# Patient Record
Sex: FEMALE | Race: BLACK OR AFRICAN AMERICAN | NOT HISPANIC OR LATINO | Employment: FULL TIME | URBAN - METROPOLITAN AREA
[De-identification: names, ages, dates, MRNs, and addresses within clinical notes are randomized per-mention and may not be internally consistent; named-entity substitution may affect disease eponyms.]

---

## 2020-12-03 ENCOUNTER — OFFICE VISIT (OUTPATIENT)
Dept: FAMILY MEDICINE CLINIC | Facility: CLINIC | Age: 45
End: 2020-12-03

## 2020-12-03 ENCOUNTER — HOSPITAL ENCOUNTER (OUTPATIENT)
Dept: RADIOLOGY | Facility: HOSPITAL | Age: 45
Discharge: HOME | End: 2020-12-03
Payer: COMMERCIAL

## 2020-12-03 VITALS
HEIGHT: 64 IN | BODY MASS INDEX: 38.24 KG/M2 | SYSTOLIC BLOOD PRESSURE: 122 MMHG | HEART RATE: 80 BPM | DIASTOLIC BLOOD PRESSURE: 72 MMHG | TEMPERATURE: 97.8 F | OXYGEN SATURATION: 98 % | WEIGHT: 224 LBS

## 2020-12-03 DIAGNOSIS — M79.671 RIGHT FOOT PAIN: Primary | ICD-10-CM

## 2020-12-03 DIAGNOSIS — M79.671 RIGHT FOOT PAIN: ICD-10-CM

## 2020-12-03 DIAGNOSIS — Z00.00 HEALTHCARE MAINTENANCE: ICD-10-CM

## 2020-12-03 DIAGNOSIS — M77.9 TENDONITIS: ICD-10-CM

## 2020-12-03 DIAGNOSIS — K21.9 GASTROESOPHAGEAL REFLUX DISEASE WITHOUT ESOPHAGITIS: ICD-10-CM

## 2020-12-03 PROBLEM — Z76.89 ENCOUNTER TO ESTABLISH CARE: Status: ACTIVE | Noted: 2020-12-03

## 2020-12-03 PROCEDURE — 73630 X-RAY EXAM OF FOOT: CPT

## 2020-12-03 PROCEDURE — 99202 OFFICE O/P NEW SF 15 MIN: CPT | Performed by: NURSE PRACTITIONER

## 2020-12-03 RX ORDER — METHOCARBAMOL 500 MG/1
500 TABLET, FILM COATED ORAL 4 TIMES DAILY
Qty: 20 TABLET | Refills: 0 | Status: SHIPPED | OUTPATIENT
Start: 2020-12-03 | End: 2022-04-06 | Stop reason: ALTCHOICE

## 2020-12-03 RX ORDER — METHYLPREDNISOLONE 4 MG/1
TABLET ORAL
Qty: 21 EACH | Refills: 0 | Status: SHIPPED | OUTPATIENT
Start: 2020-12-03 | End: 2022-04-06 | Stop reason: ALTCHOICE

## 2020-12-03 RX ORDER — PANTOPRAZOLE SODIUM 20 MG/1
20 TABLET, DELAYED RELEASE ORAL
Qty: 30 TABLET | Refills: 5 | Status: SHIPPED | OUTPATIENT
Start: 2020-12-03

## 2020-12-10 ENCOUNTER — OFFICE VISIT (OUTPATIENT)
Dept: FAMILY MEDICINE CLINIC | Facility: CLINIC | Age: 45
End: 2020-12-10

## 2020-12-10 VITALS
TEMPERATURE: 98.6 F | BODY MASS INDEX: 38.07 KG/M2 | SYSTOLIC BLOOD PRESSURE: 130 MMHG | OXYGEN SATURATION: 98 % | WEIGHT: 223 LBS | DIASTOLIC BLOOD PRESSURE: 78 MMHG | RESPIRATION RATE: 18 BRPM | HEIGHT: 64 IN | HEART RATE: 86 BPM

## 2020-12-10 DIAGNOSIS — N92.1 MENORRHAGIA WITH IRREGULAR CYCLE: Primary | ICD-10-CM

## 2020-12-10 PROBLEM — M79.671 RIGHT FOOT PAIN: Status: ACTIVE | Noted: 2020-12-10

## 2020-12-10 PROCEDURE — 99213 OFFICE O/P EST LOW 20 MIN: CPT | Performed by: NURSE PRACTITIONER

## 2020-12-10 RX ORDER — TRANEXAMIC ACID 650 1/1
650 TABLET ORAL 3 TIMES DAILY PRN
Qty: 30 TABLET | Refills: 0 | Status: SHIPPED | OUTPATIENT
Start: 2020-12-10 | End: 2022-04-06 | Stop reason: ALTCHOICE

## 2021-08-11 ENCOUNTER — OFFICE VISIT (OUTPATIENT)
Dept: FAMILY MEDICINE CLINIC | Facility: CLINIC | Age: 46
End: 2021-08-11
Payer: COMMERCIAL

## 2021-08-11 VITALS
RESPIRATION RATE: 16 BRPM | DIASTOLIC BLOOD PRESSURE: 90 MMHG | TEMPERATURE: 98.4 F | WEIGHT: 223.6 LBS | OXYGEN SATURATION: 98 % | HEART RATE: 80 BPM | SYSTOLIC BLOOD PRESSURE: 140 MMHG | BODY MASS INDEX: 38.17 KG/M2 | HEIGHT: 64 IN

## 2021-08-11 DIAGNOSIS — Z12.31 SCREENING MAMMOGRAM, ENCOUNTER FOR: ICD-10-CM

## 2021-08-11 DIAGNOSIS — Z00.00 ANNUAL PHYSICAL EXAM: Primary | ICD-10-CM

## 2021-08-11 DIAGNOSIS — G47.30 SLEEP APNEA, UNSPECIFIED TYPE: ICD-10-CM

## 2021-08-11 DIAGNOSIS — Z00.00 HEALTHCARE MAINTENANCE: ICD-10-CM

## 2021-08-11 DIAGNOSIS — R06.83 SNORING: ICD-10-CM

## 2021-08-11 PROCEDURE — 99396 PREV VISIT EST AGE 40-64: CPT | Performed by: NURSE PRACTITIONER

## 2021-08-11 NOTE — ASSESSMENT & PLAN NOTE
Patient is here for annual physical   Mammogram ordered  Will get Pap smear done in office  Encouraged heart healthy diet  Discussed elevated blood pressure in office today  Advised to check at home  If blood pressure continues to be 150/90 call office  Discussed the benefits of weight loss  's license permit completed

## 2021-08-11 NOTE — PROGRESS NOTES
[facility] [address]     NAME: Nyla Cuello  AGE: 54 y o  SEX: female  : 1975     DATE: 2021     Assessment and Plan:     Problem List Items Addressed This Visit        Other    Annual physical exam - Primary     Patient is here for annual physical   Mammogram ordered  Will get Pap smear done in office  Encouraged heart healthy diet  Discussed elevated blood pressure in office today  Advised to check at home  If blood pressure continues to be 150/90 call office  Discussed the benefits of weight loss  's license permit completed  Other Visit Diagnoses     Snoring        Relevant Orders    Diagnostic Sleep Study    Sleep apnea, unspecified type        Relevant Orders    Diagnostic Sleep Study    Healthcare maintenance        Relevant Orders    CBC and differential    Comprehensive metabolic panel    Lipid panel    TSH, 3rd generation with Free T4 reflex    Screening mammogram, encounter for        Relevant Orders    Mammo screening bilateral w cad          Immunizations and preventive care screenings were discussed with patient today  Appropriate education was printed on patient's after visit summary  Counseling:  Alcohol/drug use: discussed moderation in alcohol intake, the recommendations for healthy alcohol use, and avoidance of illicit drug use  Dental Health: discussed importance of regular tooth brushing, flossing, and dental visits  Injury prevention: discussed safety/seat belts, safety helmets, smoke detectors, carbon dioxide detectors, and smoking near bedding or upholstery  Sexual health: discussed sexually transmitted diseases, partner selection, use of condoms, avoidance of unintended pregnancy, and contraceptive alternatives  · Exercise: the importance of regular exercise/physical activity was discussed   Recommend exercise 3-5 times per week for at least 30 minutes  BMI Counseling: Body mass index is 38 38 kg/m²  The BMI is above normal  Nutrition recommendations include decreasing portion sizes, encouraging healthy choices of fruits and vegetables, decreasing fast food intake, consuming healthier snacks, limiting drinks that contain sugar, moderation in carbohydrate intake, increasing intake of lean protein, reducing intake of saturated and trans fat and reducing intake of cholesterol  Exercise recommendations include exercising 3-5 times per week and strength training exercises  No pharmacotherapy was ordered  No follow-ups on file  Chief Complaint:     Chief Complaint   Patient presents with    Physical Exam     due for mammo and pap BMI f/u plan       History of Present Illness:     Adult Annual Physical   Patient here for a comprehensive physical exam  The patient reports no problems  Diet and Physical Activity  · Diet/Nutrition: well balanced diet  · Exercise: no formal exercise  Depression Screening  PHQ-9 Depression Screening    PHQ-9:   Frequency of the following problems over the past two weeks:      Little interest or pleasure in doing things: 0 - not at all  Feeling down, depressed, or hopeless: 0 - not at all  PHQ-2 Score: 0       General Health  · Sleep: sleeps well  · Hearing: normal - bilateral   · Vision: most recent eye exam <1 year ago and wears glasses  · Dental: regular dental visits and brushes teeth twice daily  /GYN Health  · Patient is: premenopausal  · Last menstrual period:  today  · Contraceptive method: none needed  Review of Systems:     Review of Systems   Constitutional: Negative  HENT: Negative  Eyes: Negative  Respiratory: Negative  Cardiovascular: Negative  Gastrointestinal: Negative  Endocrine: Negative  Genitourinary: Negative  Musculoskeletal: Negative  Skin: Negative  Allergic/Immunologic: Negative  Neurological: Negative      Psychiatric/Behavioral: Negative  Past Medical History:     No past medical history on file  Past Surgical History:     No past surgical history on file  Social History:     Social History     Socioeconomic History    Marital status: /Civil Union     Spouse name: None    Number of children: None    Years of education: None    Highest education level: None   Occupational History    None   Tobacco Use    Smoking status: Never Smoker    Smokeless tobacco: Never Used   Substance and Sexual Activity    Alcohol use: Not Currently    Drug use: None    Sexual activity: None   Other Topics Concern    None   Social History Narrative    None     Social Determinants of Health     Financial Resource Strain:     Difficulty of Paying Living Expenses:    Food Insecurity:     Worried About Running Out of Food in the Last Year:     Ran Out of Food in the Last Year:    Transportation Needs:     Lack of Transportation (Medical):  Lack of Transportation (Non-Medical):    Physical Activity:     Days of Exercise per Week:     Minutes of Exercise per Session:    Stress:     Feeling of Stress :    Social Connections:     Frequency of Communication with Friends and Family:     Frequency of Social Gatherings with Friends and Family:     Attends Taoism Services:     Active Member of Clubs or Organizations:     Attends Club or Organization Meetings:     Marital Status:    Intimate Partner Violence:     Fear of Current or Ex-Partner:     Emotionally Abused:     Physically Abused:     Sexually Abused:       Family History:     No family history on file     Current Medications:     Current Outpatient Medications   Medication Sig Dispense Refill    methocarbamol (ROBAXIN) 500 mg tablet Take 1 tablet (500 mg total) by mouth 4 (four) times a day (Patient not taking: Reported on 8/11/2021) 20 tablet 0    methylPREDNISolone 4 MG tablet therapy pack Use as directed on package (Patient not taking: Reported on 8/11/2021) 21 each 0    pantoprazole (PROTONIX) 20 mg tablet Take 1 tablet (20 mg total) by mouth daily before breakfast (Patient not taking: Reported on 8/11/2021) 30 tablet 5    Tranexamic Acid 650 MG TABS Take 1 tablet (650 mg total) by mouth 3 (three) times a day as needed (menorrhagia) (Patient not taking: Reported on 8/11/2021) 30 tablet 0     No current facility-administered medications for this visit  Allergies:     No Known Allergies   Physical Exam:     /90   Pulse 80   Temp 98 4 °F (36 9 °C)   Resp 16   Ht 5' 4" (1 626 m)   Wt 101 kg (223 lb 9 6 oz)   SpO2 98%   BMI 38 38 kg/m²     Physical Exam  Constitutional:       General: She is not in acute distress  Appearance: Normal appearance  She is obese  She is not ill-appearing  HENT:      Head: Normocephalic and atraumatic  Nose: Nose normal       Mouth/Throat:      Mouth: Mucous membranes are moist    Eyes:      Pupils: Pupils are equal, round, and reactive to light  Cardiovascular:      Rate and Rhythm: Normal rate and regular rhythm  Pulses: Normal pulses  Heart sounds: Normal heart sounds  Pulmonary:      Effort: Pulmonary effort is normal  No respiratory distress  Breath sounds: Normal breath sounds  Chest:      Chest wall: No tenderness  Abdominal:      General: Abdomen is flat  Bowel sounds are normal  There is no distension  Palpations: There is no mass  Tenderness: There is no abdominal tenderness  Musculoskeletal:         General: Normal range of motion  Cervical back: Normal range of motion and neck supple  Skin:     General: Skin is warm and dry  Neurological:      General: No focal deficit present  Mental Status: She is alert and oriented to person, place, and time  Psychiatric:         Mood and Affect: Mood normal          Behavior: Behavior normal          Thought Content:  Thought content normal          Judgment: Judgment normal           Al Forrester, [facility] [address]

## 2021-08-11 NOTE — PATIENT INSTRUCTIONS

## 2021-08-23 ENCOUNTER — TELEPHONE (OUTPATIENT)
Dept: FAMILY MEDICINE CLINIC | Facility: CLINIC | Age: 46
End: 2021-08-23

## 2021-08-23 NOTE — TELEPHONE ENCOUNTER
Called patient to schedule her for her PAP  Unable to leave a message due to vm box not being set up

## 2021-12-19 ENCOUNTER — HOSPITAL ENCOUNTER (EMERGENCY)
Facility: HOSPITAL | Age: 46
Discharge: HOME | End: 2021-12-20
Attending: EMERGENCY MEDICINE
Payer: COMMERCIAL

## 2021-12-19 DIAGNOSIS — F32.A DEPRESSION: Primary | ICD-10-CM

## 2021-12-19 LAB
AMPHETAMINES SERPL QL SCN: NEGATIVE
BARBITURATES UR QL: NEGATIVE
BENZODIAZ UR QL: NEGATIVE
COCAINE UR QL: NEGATIVE
ETHANOL EXG-MCNC: 0 MG/DL
EXT PREG TEST URINE: NEGATIVE
EXT. CONTROL ED NAV: NORMAL
FLUAV RNA RESP QL NAA+PROBE: NEGATIVE
FLUBV RNA RESP QL NAA+PROBE: NEGATIVE
METHADONE UR QL: NEGATIVE
OPIATES UR QL SCN: NEGATIVE
OXYCODONE+OXYMORPHONE UR QL SCN: NEGATIVE
PCP UR QL: NEGATIVE
RSV RNA RESP QL NAA+PROBE: NEGATIVE
SARS-COV-2 RNA RESP QL NAA+PROBE: NEGATIVE
THC UR QL: NEGATIVE

## 2021-12-19 PROCEDURE — 0241U HB NFCT DS VIR RESP RNA 4 TRGT: CPT

## 2021-12-19 PROCEDURE — 80307 DRUG TEST PRSMV CHEM ANLYZR: CPT

## 2021-12-19 PROCEDURE — 99285 EMERGENCY DEPT VISIT HI MDM: CPT

## 2021-12-19 PROCEDURE — 99284 EMERGENCY DEPT VISIT MOD MDM: CPT

## 2021-12-19 PROCEDURE — 81025 URINE PREGNANCY TEST: CPT

## 2021-12-19 PROCEDURE — 82075 ASSAY OF BREATH ETHANOL: CPT

## 2021-12-19 RX ORDER — ACETAMINOPHEN 325 MG/1
650 TABLET ORAL ONCE
Status: DISCONTINUED | OUTPATIENT
Start: 2021-12-20 | End: 2021-12-20 | Stop reason: HOSPADM

## 2021-12-19 RX ORDER — KETOROLAC TROMETHAMINE 30 MG/ML
15 INJECTION, SOLUTION INTRAMUSCULAR; INTRAVENOUS ONCE
Status: DISCONTINUED | OUTPATIENT
Start: 2021-12-19 | End: 2021-12-20 | Stop reason: HOSPADM

## 2021-12-20 VITALS
OXYGEN SATURATION: 97 % | TEMPERATURE: 97.9 F | BODY MASS INDEX: 39.34 KG/M2 | HEIGHT: 64 IN | RESPIRATION RATE: 18 BRPM | HEART RATE: 77 BPM | WEIGHT: 230.4 LBS | SYSTOLIC BLOOD PRESSURE: 115 MMHG | DIASTOLIC BLOOD PRESSURE: 56 MMHG

## 2021-12-20 PROCEDURE — 99244 OFF/OP CNSLTJ NEW/EST MOD 40: CPT | Performed by: PSYCHIATRY & NEUROLOGY

## 2021-12-20 RX ORDER — ACETAMINOPHEN 325 MG/1
650 TABLET ORAL ONCE
Status: COMPLETED | OUTPATIENT
Start: 2021-12-20 | End: 2021-12-20

## 2021-12-20 RX ADMIN — ACETAMINOPHEN 650 MG: 325 TABLET, FILM COATED ORAL at 09:23

## 2021-12-21 ENCOUNTER — TELEPHONE (OUTPATIENT)
Dept: FAMILY MEDICINE CLINIC | Facility: CLINIC | Age: 46
End: 2021-12-21

## 2021-12-21 NOTE — TELEPHONE ENCOUNTER
Pt's  is requesting a phone call back  He has question about [facility] hospital gave her wife  appt with you was made on 12/22   Please advise, Thank misael

## 2021-12-22 ENCOUNTER — OFFICE VISIT (OUTPATIENT)
Dept: FAMILY MEDICINE CLINIC | Facility: CLINIC | Age: 46
End: 2021-12-22
Payer: COMMERCIAL

## 2021-12-22 VITALS
TEMPERATURE: 99.1 F | HEIGHT: 64 IN | RESPIRATION RATE: 18 BRPM | SYSTOLIC BLOOD PRESSURE: 120 MMHG | BODY MASS INDEX: 38.31 KG/M2 | DIASTOLIC BLOOD PRESSURE: 82 MMHG | WEIGHT: 224.4 LBS | OXYGEN SATURATION: 98 % | HEART RATE: 93 BPM

## 2021-12-22 DIAGNOSIS — F32.A ANXIETY AND DEPRESSION: Primary | ICD-10-CM

## 2021-12-22 DIAGNOSIS — R53.83 FATIGUE, UNSPECIFIED TYPE: ICD-10-CM

## 2021-12-22 DIAGNOSIS — D50.9 IRON DEFICIENCY ANEMIA, UNSPECIFIED IRON DEFICIENCY ANEMIA TYPE: ICD-10-CM

## 2021-12-22 DIAGNOSIS — F41.9 ANXIETY AND DEPRESSION: Primary | ICD-10-CM

## 2021-12-22 PROCEDURE — 99214 OFFICE O/P EST MOD 30 MIN: CPT | Performed by: NURSE PRACTITIONER

## 2021-12-23 PROBLEM — F32.A ANXIETY AND DEPRESSION: Status: ACTIVE | Noted: 2021-12-23

## 2021-12-23 PROBLEM — R53.83 FATIGUE: Status: ACTIVE | Noted: 2021-12-23

## 2021-12-23 PROBLEM — F41.9 ANXIETY AND DEPRESSION: Status: ACTIVE | Noted: 2021-12-23

## 2021-12-30 ENCOUNTER — APPOINTMENT (OUTPATIENT)
Dept: LAB | Facility: HOSPITAL | Age: 46
End: 2021-12-30
Payer: COMMERCIAL

## 2021-12-30 DIAGNOSIS — D50.9 IRON DEFICIENCY ANEMIA, UNSPECIFIED IRON DEFICIENCY ANEMIA TYPE: ICD-10-CM

## 2021-12-30 DIAGNOSIS — Z00.00 HEALTHCARE MAINTENANCE: ICD-10-CM

## 2021-12-30 DIAGNOSIS — F32.A ANXIETY AND DEPRESSION: ICD-10-CM

## 2021-12-30 DIAGNOSIS — R53.83 FATIGUE, UNSPECIFIED TYPE: ICD-10-CM

## 2021-12-30 DIAGNOSIS — F41.9 ANXIETY AND DEPRESSION: ICD-10-CM

## 2021-12-30 LAB
25(OH)D3 SERPL-MCNC: 21.6 NG/ML (ref 30–100)
ALBUMIN SERPL BCP-MCNC: 3.3 G/DL (ref 3.5–5)
ALP SERPL-CCNC: 103 U/L (ref 46–116)
ALT SERPL W P-5'-P-CCNC: 28 U/L (ref 12–78)
ANION GAP SERPL CALCULATED.3IONS-SCNC: 11 MMOL/L (ref 4–13)
AST SERPL W P-5'-P-CCNC: 27 U/L (ref 5–45)
BASOPHILS # BLD AUTO: 0.08 THOUSANDS/ΜL (ref 0–0.1)
BASOPHILS NFR BLD AUTO: 1 % (ref 0–1)
BILIRUB SERPL-MCNC: 0.55 MG/DL (ref 0.2–1)
BUN SERPL-MCNC: 6 MG/DL (ref 5–25)
CALCIUM ALBUM COR SERPL-MCNC: 8.5 MG/DL (ref 8.3–10.1)
CALCIUM SERPL-MCNC: 7.9 MG/DL (ref 8.3–10.1)
CHLORIDE SERPL-SCNC: 106 MMOL/L (ref 100–108)
CHOLEST SERPL-MCNC: 138 MG/DL
CO2 SERPL-SCNC: 25 MMOL/L (ref 21–32)
CREAT SERPL-MCNC: 0.83 MG/DL (ref 0.6–1.3)
EOSINOPHIL # BLD AUTO: 0.34 THOUSAND/ΜL (ref 0–0.61)
EOSINOPHIL NFR BLD AUTO: 4 % (ref 0–6)
ERYTHROCYTE [DISTWIDTH] IN BLOOD BY AUTOMATED COUNT: 20.5 % (ref 11.6–15.1)
FERRITIN SERPL-MCNC: 4 NG/ML (ref 8–388)
GFR SERPL CREATININE-BSD FRML MDRD: 84 ML/MIN/1.73SQ M
GLUCOSE P FAST SERPL-MCNC: 87 MG/DL (ref 65–99)
HCT VFR BLD AUTO: 32.9 % (ref 34.8–46.1)
HDLC SERPL-MCNC: 41 MG/DL
HGB BLD-MCNC: 9.3 G/DL (ref 11.5–15.4)
IMM GRANULOCYTES # BLD AUTO: 0.03 THOUSAND/UL (ref 0–0.2)
IMM GRANULOCYTES NFR BLD AUTO: 0 % (ref 0–2)
IRON SATN MFR SERPL: 6 % (ref 15–50)
IRON SERPL-MCNC: 24 UG/DL (ref 50–170)
LDLC SERPL CALC-MCNC: 85 MG/DL (ref 0–100)
LYMPHOCYTES # BLD AUTO: 2.83 THOUSANDS/ΜL (ref 0.6–4.47)
LYMPHOCYTES NFR BLD AUTO: 33 % (ref 14–44)
MCH RBC QN AUTO: 19.6 PG (ref 26.8–34.3)
MCHC RBC AUTO-ENTMCNC: 28.3 G/DL (ref 31.4–37.4)
MCV RBC AUTO: 69 FL (ref 82–98)
MONOCYTES # BLD AUTO: 0.77 THOUSAND/ΜL (ref 0.17–1.22)
MONOCYTES NFR BLD AUTO: 9 % (ref 4–12)
NEUTROPHILS # BLD AUTO: 4.6 THOUSANDS/ΜL (ref 1.85–7.62)
NEUTS SEG NFR BLD AUTO: 53 % (ref 43–75)
NONHDLC SERPL-MCNC: 97 MG/DL
NRBC BLD AUTO-RTO: 0 /100 WBCS
PLATELET # BLD AUTO: 519 THOUSANDS/UL (ref 149–390)
PMV BLD AUTO: 10.9 FL (ref 8.9–12.7)
POTASSIUM SERPL-SCNC: 3.8 MMOL/L (ref 3.5–5.3)
PROT SERPL-MCNC: 7.7 G/DL (ref 6.4–8.2)
RBC # BLD AUTO: 4.75 MILLION/UL (ref 3.81–5.12)
SODIUM SERPL-SCNC: 142 MMOL/L (ref 136–145)
T4 FREE SERPL-MCNC: 0.69 NG/DL (ref 0.76–1.46)
TIBC SERPL-MCNC: 383 UG/DL (ref 250–450)
TRIGL SERPL-MCNC: 61 MG/DL
TSH SERPL DL<=0.05 MIU/L-ACNC: 6.9 UIU/ML (ref 0.36–3.74)
WBC # BLD AUTO: 8.65 THOUSAND/UL (ref 4.31–10.16)

## 2021-12-30 PROCEDURE — 83550 IRON BINDING TEST: CPT

## 2021-12-30 PROCEDURE — 82306 VITAMIN D 25 HYDROXY: CPT

## 2021-12-30 PROCEDURE — 82728 ASSAY OF FERRITIN: CPT

## 2021-12-30 PROCEDURE — 83540 ASSAY OF IRON: CPT

## 2021-12-30 PROCEDURE — 80061 LIPID PANEL: CPT

## 2021-12-30 PROCEDURE — 36415 COLL VENOUS BLD VENIPUNCTURE: CPT

## 2021-12-30 PROCEDURE — 85025 COMPLETE CBC W/AUTO DIFF WBC: CPT

## 2021-12-30 PROCEDURE — 84439 ASSAY OF FREE THYROXINE: CPT

## 2021-12-30 PROCEDURE — 80053 COMPREHEN METABOLIC PANEL: CPT

## 2021-12-30 PROCEDURE — 84443 ASSAY THYROID STIM HORMONE: CPT

## 2022-01-11 ENCOUNTER — OFFICE VISIT (OUTPATIENT)
Dept: FAMILY MEDICINE CLINIC | Facility: CLINIC | Age: 47
End: 2022-01-11
Payer: COMMERCIAL

## 2022-01-11 VITALS
HEART RATE: 63 BPM | SYSTOLIC BLOOD PRESSURE: 126 MMHG | DIASTOLIC BLOOD PRESSURE: 78 MMHG | WEIGHT: 226 LBS | HEIGHT: 64 IN | OXYGEN SATURATION: 99 % | BODY MASS INDEX: 38.58 KG/M2

## 2022-01-11 DIAGNOSIS — Z12.31 ENCOUNTER FOR SCREENING MAMMOGRAM FOR MALIGNANT NEOPLASM OF BREAST: ICD-10-CM

## 2022-01-11 DIAGNOSIS — E03.9 HYPOTHYROIDISM, UNSPECIFIED TYPE: ICD-10-CM

## 2022-01-11 DIAGNOSIS — R11.0 NAUSEA: ICD-10-CM

## 2022-01-11 DIAGNOSIS — E61.1 IRON DEFICIENCY: ICD-10-CM

## 2022-01-11 DIAGNOSIS — N92.0 MENORRHAGIA WITH REGULAR CYCLE: Primary | ICD-10-CM

## 2022-01-11 PROCEDURE — 99214 OFFICE O/P EST MOD 30 MIN: CPT | Performed by: NURSE PRACTITIONER

## 2022-01-11 RX ORDER — LEVOTHYROXINE SODIUM 0.03 MG/1
25 TABLET ORAL
Qty: 30 TABLET | Refills: 5 | Status: SHIPPED | OUTPATIENT
Start: 2022-01-11 | End: 2022-02-16 | Stop reason: DRUGHIGH

## 2022-01-11 RX ORDER — IRON,CARBONYL/ASCORBIC ACID 65MG-125MG
1 TABLET, DELAYED RELEASE (ENTERIC COATED) ORAL 2 TIMES DAILY
Qty: 60 TABLET | Refills: 3 | Status: SHIPPED | OUTPATIENT
Start: 2022-01-11

## 2022-01-11 RX ORDER — ONDANSETRON 4 MG/1
4 TABLET, FILM COATED ORAL EVERY 8 HOURS PRN
Qty: 20 TABLET | Refills: 0 | Status: SHIPPED | OUTPATIENT
Start: 2022-01-11 | End: 2022-04-06 | Stop reason: ALTCHOICE

## 2022-01-11 NOTE — ASSESSMENT & PLAN NOTE
Patient has nausea with  Zoloft  Zofran prescribed  Discussed management with mint tea, ginger  Ale  yogurt     Continuing with the Zoloft

## 2022-01-11 NOTE — ASSESSMENT & PLAN NOTE
Patient continues have irregular menses, reports clotting  Will get transvaginal ultrasound done  Reviewed blood work for anemia  Will start iron replacement  Discussed increase in intake of iron rich foods  Also discussed management of constipation with iron pills    Referral made to gyn for management of menorrhagia

## 2022-01-11 NOTE — PATIENT INSTRUCTIONS
Take levothyroxine in the morning do not eat anything for hour   Take iron pills twice a day increase iron intake with foods anything green   iron pills may call constipation so make sure you drink a lot of fluids a move a little bit to help with the constipation  Get ultrasound of the thyroid done  Get ultrasound of the abdomen done follow-up with gynecology   continue with the Zoloft  Drink ginger tea or min tea to help with the nausea eat yogurt may take Zofran daily as needed for nausea  Get mammogram done    Menorrhagia   WHAT YOU NEED TO KNOW:   Menorrhagia is heavy menstrual bleeding for more than 7 days or severe menstrual bleeding for less than 7 days  Your menstrual bleeding and cramping are so heavy that you have trouble doing your usual daily activities  Your monthly period may also occur more often, and you may bleed between periods  Menorrhagia is common in adolescence and around menopause  DISCHARGE INSTRUCTIONS:   Call your local emergency number (911 in the [address] [place] [address],[address]) for any of the following:   · You have chest pain and shortness of breath  · Your heart is fluttering or beating faster than usual for you  Return to the emergency department if:   · You feel dizzy when you stand  · You feel confused  · You have severe abdominal pain, nausea, and vomiting  · Your skin or the whites of your eyes turn yellow  Call your doctor or gynecologist if:   · You need to change your pad or tampon more than 1 time per hour, for several hours in a row  · You feel more weak and tired than usual     · You have new coldness in your hands and feet  · You have questions or concerns about your condition or care  Medicines: You may need any of the following:  · Iron supplements  may be given if your blood iron level decreases because of heavy bleeding  · NSAIDs , such as ibuprofen, help decrease swelling, pain, and fever  NSAIDs can cause stomach bleeding or kidney problems in certain people  If you take blood thinner medicine, always ask your healthcare provider if NSAIDs are safe for you  Always read the medicine label and follow directions  · Hormones  help slow or stop your bleeding and make your monthly periods more regular  This medicine may be given as birth control pills or an intrauterine device (IUD)  · Take your medicine as directed  Contact your healthcare provider if you think your medicine is not helping or if you have side effects  Tell him of her if you are allergic to any medicine  Keep a list of the medicines, vitamins, and herbs you take  Include the amounts, and when and why you take them  Bring the list or the pill bottles to follow-up visits  Carry your medicine list with you in case of an emergency  Manage your symptoms:   · Keep a supply of pads or tampons with you at all times  If possible, stay close to a bathroom  · Apply heat  on your abdomen to decrease pain and cramps  You can use a heating pad on a low setting  Apply heat for 20 to 30 minutes every 2 hours for as many days as directed  Follow up with your doctor or gynecologist as directed: You may need regular pelvic exams with Pap smears to monitor your condition  Write down your questions so you remember to ask them during your visits  © Copyright [employer] 2021 Information is for End User's use only and may not be sold, redistributed or otherwise used for commercial purposes  All illustrations and images included in CareNotes® are the copyrighted property of A D A M , Inc  or Laura Suh  The above information is an  only  It is not intended as medical advice for individual conditions or treatments  Talk to your doctor, nurse or pharmacist before following any medical regimen to see if it is safe and effective for you

## 2022-01-11 NOTE — ASSESSMENT & PLAN NOTE
Blood work reviewed  Patient does have menorrhagia  Vitron-C twice a day  Increase iron rich food intake

## 2022-01-11 NOTE — ASSESSMENT & PLAN NOTE
Reviewed blood work  Will get ultrasound of thyroid    Will start 25 mcgs of levothyroxine, levels will be checked in 6 weeks

## 2022-01-11 NOTE — PROGRESS NOTES
Assessment/Plan:  BMI Counseling: Body mass index is 38 79 kg/m²  The BMI is above normal  Nutrition recommendations include decreasing portion sizes, encouraging healthy choices of fruits and vegetables, decreasing fast food intake, consuming healthier snacks, limiting drinks that contain sugar, moderation in carbohydrate intake, increasing intake of lean protein, reducing intake of saturated and trans fat and reducing intake of cholesterol  Exercise recommendations include exercising 3-5 times per week and strength training exercises  No pharmacotherapy was ordered  Rationale for BMI follow-up plan is due to patient being overweight or obese  Menorrhagia with irregular cycle    Patient continues have irregular menses, reports clotting  Will get transvaginal ultrasound done  Reviewed blood work for anemia  Will start iron replacement  Discussed increase in intake of iron rich foods  Also discussed management of constipation with iron pills  Referral made to gyn for management of menorrhagia    Hypothyroidism    Reviewed blood work  Will get ultrasound of thyroid  Will start 25 mcgs of levothyroxine, levels will be checked in 6 weeks    Iron deficiency   Blood work reviewed  Patient does have menorrhagia  Vitron-C twice a day  Increase iron rich food intake  Nausea   Patient has nausea with  Zoloft  Zofran prescribed  Discussed management with mint tea, ginger  Ale  yogurt  Continuing with the Zoloft         Problem List Items Addressed This Visit        Endocrine    Hypothyroidism       Reviewed blood work  Will get ultrasound of thyroid  Will start 25 mcgs of levothyroxine, levels will be checked in 6 weeks         Relevant Medications    levothyroxine (Euthyrox) 25 mcg tablet    Other Relevant Orders    US thyroid    TSH, 3rd generation with Free T4 reflex       Other    Iron deficiency      Blood work reviewed  Patient does have menorrhagia  Vitron-C twice a day    Increase iron rich food intake  Relevant Medications    Iron-Vitamin C (Vitron-C)  MG TABS    Nausea      Patient has nausea with  Zoloft  Zofran prescribed  Discussed management with mint tea, ginger  Ale  yogurt  Continuing with the Zoloft         Relevant Medications    ondansetron (ZOFRAN) 4 mg tablet    RESOLVED: Menorrhagia with regular cycle - Primary    Relevant Medications    Iron-Vitamin C (Vitron-C)  MG TABS    Other Relevant Orders    US abdomen and pelvis with transvaginal    Ambulatory Referral to Gynecology      Other Visit Diagnoses     Encounter for screening mammogram for malignant neoplasm of breast        Relevant Orders    Mammo screening bilateral w cad            Subjective:      Patient ID: Jeff Carlisle is a 51 y o  female  Patient is here for follow-up on labs  Continues to feel fatigued, has anxiety  Has been taking the Zoloft with some side effects, nausea, upset stomach  Patient has heavy bleeding with her menses  Reports clotting  The following portions of the patient's history were reviewed and updated as appropriate: allergies, current medications, past family history, past medical history, past social history, past surgical history and problem list     Review of Systems   Constitutional: Negative  HENT: Negative  Eyes: Negative  Respiratory: Negative  Cardiovascular: Positive for palpitations  Gastrointestinal: Negative  Endocrine: Negative  Genitourinary: Negative  Musculoskeletal: Negative  Skin: Negative  Allergic/Immunologic: Negative  Neurological: Negative  Psychiatric/Behavioral: Positive for decreased concentration and dysphoric mood  The patient is nervous/anxious  Objective:      /78   Pulse 63   Ht 5' 4" (1 626 m)   Wt 103 kg (226 lb)   SpO2 99%   BMI 38 79 kg/m²          Physical Exam  Vitals and nursing note reviewed  Constitutional:       Appearance: She is well-developed  She is obese  HENT:      Head: Normocephalic and atraumatic  Cardiovascular:      Rate and Rhythm: Normal rate and regular rhythm  Pulses: Normal pulses  Heart sounds: Normal heart sounds  Pulmonary:      Effort: Pulmonary effort is normal       Breath sounds: Normal breath sounds  Musculoskeletal:         General: Normal range of motion  Cervical back: Normal range of motion  Skin:     General: Skin is warm and dry  Neurological:      Mental Status: She is alert and oriented to person, place, and time  Psychiatric:         Mood and Affect: Mood normal          Behavior: Behavior normal          Thought Content:  Thought content normal          Judgment: Judgment normal            Labs:    Lab Results   Component Value Date    WBC 8 65 12/30/2021    HGB 9 3 (L) 12/30/2021    HCT 32 9 (L) 12/30/2021    MCV 69 (L) 12/30/2021     (H) 12/30/2021     Lab Results   Component Value Date    K 3 8 12/30/2021     12/30/2021    CO2 25 12/30/2021    BUN 6 12/30/2021    CREATININE 0 83 12/30/2021    GLUF 87 12/30/2021    CALCIUM 7 9 (L) 12/30/2021    CORRECTEDCA 8 5 12/30/2021    AST 27 12/30/2021    ALT 28 12/30/2021    ALKPHOS 103 12/30/2021    EGFR 84 12/30/2021     Lab Results   Component Value Date    CALCIUM 7 9 (L) 12/30/2021    K 3 8 12/30/2021    CO2 25 12/30/2021     12/30/2021    BUN 6 12/30/2021    CREATININE 0 83 12/30/2021

## 2022-01-26 ENCOUNTER — HOSPITAL ENCOUNTER (OUTPATIENT)
Dept: ULTRASOUND IMAGING | Facility: HOSPITAL | Age: 47
Discharge: HOME | End: 2022-01-26
Payer: COMMERCIAL

## 2022-01-26 DIAGNOSIS — E03.9 HYPOTHYROIDISM, UNSPECIFIED TYPE: ICD-10-CM

## 2022-01-26 PROCEDURE — 76536 US EXAM OF HEAD AND NECK: CPT

## 2022-02-08 ENCOUNTER — OFFICE VISIT (OUTPATIENT)
Dept: FAMILY MEDICINE CLINIC | Facility: CLINIC | Age: 47
End: 2022-02-08
Payer: COMMERCIAL

## 2022-02-08 VITALS
TEMPERATURE: 96.8 F | BODY MASS INDEX: 37.94 KG/M2 | HEART RATE: 75 BPM | WEIGHT: 222.2 LBS | SYSTOLIC BLOOD PRESSURE: 138 MMHG | HEIGHT: 64 IN | OXYGEN SATURATION: 98 % | DIASTOLIC BLOOD PRESSURE: 88 MMHG

## 2022-02-08 DIAGNOSIS — F41.9 ANXIETY AND DEPRESSION: Primary | ICD-10-CM

## 2022-02-08 DIAGNOSIS — F32.A ANXIETY AND DEPRESSION: Primary | ICD-10-CM

## 2022-02-08 PROCEDURE — 99213 OFFICE O/P EST LOW 20 MIN: CPT | Performed by: NURSE PRACTITIONER

## 2022-02-08 NOTE — ASSESSMENT & PLAN NOTE
Patient reports that she has taking the medication  Reports some improvement  Has made some changes with job  Has been managing anxiety    Will continue same dosage of medication

## 2022-02-08 NOTE — PROGRESS NOTES
Assessment/Plan:     Anxiety and depression   Patient reports that she has taking the medication  Reports some improvement  Has made some changes with job  Has been managing anxiety  Will continue same dosage of medication    Menorrhagia with irregular cycle   Continues to have problems with excessive bleeding during menses  Does have a follow-up with gyn  Problem List Items Addressed This Visit        Other    Anxiety and depression - Primary      Patient reports that she has taking the medication  Reports some improvement  Has made some changes with job  Has been managing anxiety  Will continue same dosage of medication                 Subjective:      Patient ID: Zulema Gore is a 51 y o  female  Patient is here to follow up with depression and anxiety  Also had her thyroid ultrasound done  Generally feels okay  Is switching jobs  Is starting a new job at[employer] overnight  States that it is a transitional job  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  Has been taking the Zoloft with no side effects  Did have an episode of nausea that may not be related to the medication  Feels like she is able to handle anxiety a lot better  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  Feels like this dosage of medication is working  The following portions of the patient's history were reviewed and updated as appropriate: allergies, current medications, past family history, past medical history, past social history, past surgical history and problem list     Review of Systems      Objective:      /88 (BP Location: Left arm, Patient Position: Sitting)   Pulse 75   Temp (!) 96 8 °F (36 °C) (Tympanic)   Ht 5' 4" (1 626 m)   Wt 101 kg (222 lb 3 2 oz)   SpO2 98%   BMI 38 14 kg/m²          Physical Exam  Vitals and nursing note reviewed  Constitutional:       Appearance: She is well-developed  She is obese  HENT:      Head: Normocephalic and atraumatic     Cardiovascular:      Rate and Rhythm: Normal rate and regular rhythm  Pulmonary:      Effort: Pulmonary effort is normal    Abdominal:      General: Bowel sounds are normal       Palpations: Abdomen is soft  Musculoskeletal:         General: Normal range of motion  Cervical back: Normal range of motion  Skin:     General: Skin is warm and dry  Neurological:      General: No focal deficit present  Mental Status: She is alert and oriented to person, place, and time  Psychiatric:         Attention and Perception: Attention and perception normal          Mood and Affect: Mood is depressed (  Improved)  Speech: Speech normal          Behavior: Behavior normal          Thought Content:  Thought content normal          Cognition and Memory: Cognition and memory normal          Judgment: Judgment normal            Labs:    Lab Results   Component Value Date    WBC 8 65 12/30/2021    HGB 9 3 (L) 12/30/2021    HCT 32 9 (L) 12/30/2021    MCV 69 (L) 12/30/2021     (H) 12/30/2021     Lab Results   Component Value Date    K 3 8 12/30/2021     12/30/2021    CO2 25 12/30/2021    BUN 6 12/30/2021    CREATININE 0 83 12/30/2021    GLUF 87 12/30/2021    CALCIUM 7 9 (L) 12/30/2021    CORRECTEDCA 8 5 12/30/2021    AST 27 12/30/2021    ALT 28 12/30/2021    ALKPHOS 103 12/30/2021    EGFR 84 12/30/2021     Lab Results   Component Value Date    CALCIUM 7 9 (L) 12/30/2021    K 3 8 12/30/2021    CO2 25 12/30/2021     12/30/2021    BUN 6 12/30/2021    CREATININE 0 83 12/30/2021

## 2022-02-08 NOTE — PATIENT INSTRUCTIONS
With compression stockings on   Get comfortable footwear   Wear back brace  Continue depression meds  Check thyroid level in 2 weeks

## 2022-02-15 ENCOUNTER — APPOINTMENT (OUTPATIENT)
Dept: LAB | Facility: HOSPITAL | Age: 47
End: 2022-02-15
Payer: COMMERCIAL

## 2022-02-15 DIAGNOSIS — E03.9 HYPOTHYROIDISM, UNSPECIFIED TYPE: ICD-10-CM

## 2022-02-15 LAB
T4 FREE SERPL-MCNC: 0.61 NG/DL (ref 0.76–1.46)
TSH SERPL DL<=0.05 MIU/L-ACNC: 6.22 UIU/ML (ref 0.36–3.74)

## 2022-02-15 PROCEDURE — 84443 ASSAY THYROID STIM HORMONE: CPT

## 2022-02-15 PROCEDURE — 36415 COLL VENOUS BLD VENIPUNCTURE: CPT

## 2022-02-15 PROCEDURE — 84439 ASSAY OF FREE THYROXINE: CPT

## 2022-03-22 ENCOUNTER — HOSPITAL ENCOUNTER (OUTPATIENT)
Dept: MAMMOGRAPHY | Facility: CLINIC | Age: 47
Discharge: HOME | End: 2022-03-22
Payer: COMMERCIAL

## 2022-03-22 VITALS — HEIGHT: 64 IN | BODY MASS INDEX: 37.9 KG/M2 | WEIGHT: 222 LBS

## 2022-03-22 DIAGNOSIS — Z12.31 ENCOUNTER FOR SCREENING MAMMOGRAM FOR MALIGNANT NEOPLASM OF BREAST: ICD-10-CM

## 2022-03-22 PROCEDURE — 77063 BREAST TOMOSYNTHESIS BI: CPT

## 2022-03-22 PROCEDURE — 77067 SCR MAMMO BI INCL CAD: CPT

## 2022-03-29 ENCOUNTER — HOSPITAL ENCOUNTER (OUTPATIENT)
Dept: ULTRASOUND IMAGING | Facility: CLINIC | Age: 47
Discharge: HOME | End: 2022-03-29
Payer: COMMERCIAL

## 2022-03-29 DIAGNOSIS — R92.8 ABNORMAL MAMMOGRAM: ICD-10-CM

## 2022-03-29 PROCEDURE — 76642 ULTRASOUND BREAST LIMITED: CPT

## 2022-04-06 ENCOUNTER — OFFICE VISIT (OUTPATIENT)
Dept: OBGYN CLINIC | Facility: CLINIC | Age: 47
End: 2022-04-06
Payer: COMMERCIAL

## 2022-04-06 VITALS
BODY MASS INDEX: 38.24 KG/M2 | SYSTOLIC BLOOD PRESSURE: 128 MMHG | HEIGHT: 64 IN | DIASTOLIC BLOOD PRESSURE: 76 MMHG | WEIGHT: 224 LBS

## 2022-04-06 DIAGNOSIS — R10.2 PELVIC PAIN: ICD-10-CM

## 2022-04-06 DIAGNOSIS — N92.0 MENORRHAGIA WITH REGULAR CYCLE: Primary | ICD-10-CM

## 2022-04-06 PROCEDURE — 99203 OFFICE O/P NEW LOW 30 MIN: CPT | Performed by: OBSTETRICS & GYNECOLOGY

## 2022-04-06 RX ORDER — IBUPROFEN 800 MG/1
800 TABLET ORAL
COMMUNITY
Start: 2022-03-04

## 2022-04-06 NOTE — PROGRESS NOTES
51year old female here with irregular menses   Patient reports that her menses are very heavy with clots   Patient reports that for the first 3 days she get very bad back pain

## 2022-04-06 NOTE — PATIENT INSTRUCTIONS
Abnormal (Dysfunctional) Uterine Bleeding   WHAT YOU NEED TO KNOW:   Abnormal uterine bleeding (AUB) is uterine bleeding that is not usual for you  It may also be called dysfunctional uterine bleeding  You may have bleeding from your uterus at times other than your normal monthly period  Your monthly periods may last longer or shorter, and bleeding may be heavier or lighter than usual  AUB can be acute (lasting a short time) or chronic (lasting longer than 6 months)  DISCHARGE INSTRUCTIONS:   Return to the emergency department if:   · You continue to bleed heavily, or you feel faint  Call your doctor or gynecologist if:   · You need to change your sanitary pad or tampon more than 1 time each hour  · Your medicine causes nausea, vomiting, or diarrhea  · You have questions or concerns about your condition or care  Medicines: You may need any of the following:  · Hormones  help decrease bleeding by making your monthly periods more regular  Sometimes this medicine may be given as birth control pills  · Iron supplements  may be given if your blood iron level decreases because of heavy bleeding  Iron may make you constipated  Ask your healthcare provider for ways to prevent or treat constipation  Iron may also make your bowel movements turn dark or black  · Take your medicine as directed  Contact your healthcare provider if you think your medicine is not helping or if you have side effects  Tell him or her if you are allergic to any medicine  Keep a list of the medicines, vitamins, and herbs you take  Include the amounts, and when and why you take them  Bring the list or the pill bottles to follow-up visits  Carry your medicine list with you in case of an emergency  Self-care:   · Apply heat on your lower abdomen to decrease pain and muscle spasms  Apply heat for 20 to 30 minutes every 2 hours for as many days as directed  · Include foods high in iron if needed    Examples of foods high in iron are leafy green vegetables, beef, pork, liver, eggs, and whole-grain breads and cereals  · Keep a diary of your menstrual cycles  Keep track of the number of tampons or pads you use each day  · Talk to your healthcare provider before you start a weight loss program   You may need to wait until the abnormal bleeding has stopped before you try to lose weight  The amount of iron in your blood should be normal before you lose weight  Ask your provider if weight loss will help your AUB  He or she can tell you what weight is healthy for you  He or she can help you create a safe weight loss plan, if needed  Follow up with your doctor or gynecologist as directed: You may need to return in 4 to 6 months so your provider knows if the AUB has stopped  Bring the diary of your menstrual cycles to your follow-up visits  Write down your questions so you remember to ask them during your visits  © [employer] 2022 Information is for End User's use only and may not be sold, redistributed or otherwise used for commercial purposes  All illustrations and images included in CareNotes® are the copyrighted property of A D A M , Inc  or [facility]   The above information is an  only  It is not intended as medical advice for individual conditions or treatments  Talk to your doctor, nurse or pharmacist before following any medical regimen to see if it is safe and effective for you  Prophylactic NSAID therapy for Painful or Heavy menses     Ibuprofen or Naproxen (chose 1 or the other, do not take both), Dose as noted on the box  Typically Ibuprofen dose is 600 mg, (3 tablets) every 6-8 hours  Typically Naproxen dose is 500 mg every 12 hours  Start taking medication 2 days prior to onset of menses and continue taking through the first 3 days of menses   Make sure you take consistently this is important  You need to take with food to decrease any gastrointestinal upset effects    This is proven therapy to reduce you flow and cramping by 50 %    Life style changes that have a positive effect on painful and heavy periods are as follows   Daily physical exercise    Increase fiber, fresh fruits and vegetables in your diet    Increase daily water intake    Heating pads(do not apply directly to skin, apply over clothing or towel)   Warm Baths   Relaxation techniques, meditation, massage, yoga and mindfulness        [facility]estrel ([facility], [facility], Robert Tabares) - (En el [place])     Para qué se Key[place] Efren gerald medicamento:   Sirve para prevenir el embarazo y tratar sangrado menstrual abundante  Comuníquese de inmediato con un médico o enfermera si usted tiene:  · Dolor de pecho, problemas para hablar o caminar, entumecimiento o debilidad  · Clovis sangrado vaginal, flujo vaginal, llagas genitales  · Sangrado inusual, moretones o debilidad, piel u ojos amarillos  · Dolor jessica el coito o grider phong siente el plástico min del DIU jessica el coito  · Distensión, dolor de estómago o en la pelvis, espasmos, sensibilidad o cólicos súbitos o intensos  · Dolor de amy, cambios en la visión     Efectos secundarios comunes:  · Acné, caspa, piel grasa u otros cambios en la piel  · Dolor o molestias en el seno    © Copyright [facility] 2022 Information is for [employer] use only and may not be sold, redistributed or otherwise used for commercial purposes  Intrauterine Device   AMBULATORY CARE:   An IUD  is a type of birth control that is inserted into your uterus  It is a small, flexible piece of plastic with a string on the end  It is inserted and removed by your healthcare provider  IUDs prevent sperm from reaching or fertilizing an egg  IUDs also prevent a fertilized egg from attaching to the uterus and developing into a fetus  Common types of IUDs:  Your healthcare provider will recommend the type of IUD that is right for you  This is based on your age and if you have had a child  If you have not had a child, a smaller IUD will be used  · A copper IUD  slowly releases a small amount of copper into your uterus  This IUD can remain in place for up to 10 years  · A hormone-releasing IUD  slowly releases a small amount of progesterone into your uterus  Progesterone is a hormone that is made by your body to help control your periods  This IUD can remain in place for 3 to 5 years  Seek care immediately if:   · You have severe pain or bleeding during your period  · You have a fever and severe abdominal pain  Call your doctor or gynecologist if:   · You think you are pregnant  · The IUD has come out  · You have bleeding from your vagina after you have sex, and it is not your period  · You have pain during sex  · You cannot feel the IUD string, the string feels longer, or you feel the plastic of the IUD itself  · You have vaginal discharge that is green, yellow, or has a foul odor  · You have questions or concerns about your condition or care  Advantages of an IUD:   · An IUD is 98% to 99% effective in preventing pregnancy  · The IUD can be removed by your healthcare provider if you decide to have a baby  You may be able to get pregnant as soon as the IUD is removed  · An IUD protects you from pregnancy right after it is inserted  · You do not have to stop sexual activity to insert it  You do not have to remember to take your birth control pill  · Copper IUDs are safer for some women than oral birth control pills  Examples include women who smoke or have a history of blood clots  · Hormone-releasing IUDs may decrease certain health problems  Examples include bleeding and cramping that happen with your monthly period  Disadvantages of an IUD:   · There is a small chance that you could get pregnant  Sometimes the IUD cannot be removed after you get pregnant  This increases your risk of a miscarriage or an ectopic pregnancy   Ectopic pregnancy is when the fertilized egg starts to grow somewhere other than your uterus  · An IUD does not protect you from sexually transmitted infections  · You may have cramps during the first weeks after you get the IUD  · A copper IUD may cause your monthly period to be heavier or more painful  This is more common within the first 3 months after you get the IUD  You may need to have your IUD removed if your bleeding or pain becomes severe  You may have spotting between periods  · There is a small risk of an infection within the first 20 days after the IUD is placed  Infection can lead to pelvic inflammatory disease  This can cause infertility  · Your uterus may tear when the IUD is inserted  The IUD may slip part or all of the way out of your uterus  Self-care:   · NSAIDs , such as ibuprofen, help decrease swelling, pain, and fever  This medicine is available with or without a doctor's order  NSAIDs can cause stomach bleeding or kidney problems in certain people  If you take blood thinner medicine, always ask if NSAIDs are safe for you  Always read the medicine label and follow directions  · Apply heat to relieve pain and cramping  Use a heating pad set on low  Apply heat to your lower abdomen for 20 minutes every hour, or as directed  · Return to activities as directed  Your healthcare provider will tell you when it is okay to return to work, school, or other activities  · Do not use a tampon or have sex  until your provider says it is okay  Make sure your IUD is in place: An IUD has a string that is made of plastic thread  One to 2 inches of this string hangs into your vagina  You cannot see this string, and it should not cause problems when you have sex  Check your IUD string every 3 days for the first 3 months that you have your IUD  After that, check the string after each monthly period  Do the following to check the placement of your IUD:  · Wash your hands with soap and warm water   Dry them with a clean towel  · Bend your knees and squat low to the ground  · Gently put your index finger inside your vagina  The cervix is at the top of the vagina and feels like the tip of your nose  Feel for the IUD string  Do not pull on the string  You should not be able to feel the firm plastic of the IUD itself  · Wash your hands after you check your IUD string  For more information:   · [facility] [address] , [place]  Phone: 7- 577 - 708-4223  Web Address: https://[employer]  org    Follow up with your doctor as directed:  Write down your questions so you remember to ask them during your visits  © Copyright [employer] 2022 Information is for End User's use only and may not be sold, redistributed or otherwise used for commercial purposes  All illustrations and images included in CareNotes® are the copyrighted property of A D A M , Inc  or [facility] [employer]   The above information is an  only  It is not intended as medical advice for individual conditions or treatments  Talk to your doctor, nurse or pharmacist before following any medical regimen to see if it is safe and effective for you  These are all suggestion for improving your sense of frustrations with your menstrual cycle and improving your overall wellness and lifestyle    Medroxyprogesterone (By injection)   Medroxyprogesterone (am-droo-bc-proe-JARRDO-ter-one)  Prevents pregnancy  Also treats endometriosis and is used with other medicines to help relieve symptoms of cancer, including uterine or kidney cancer  Brand Name(s): Depo-Provera, Depo-Provera Contraceptive, Depo-SubQ Provera 104, medroxyPROGESTERone acetate Novaplus   There may be other brand names for this medicine  When This Medicine Should Not Be Used: This medicine is not right for everyone   You should not receive it if you had an allergic reaction to medroxyprogesterone or if you have a history of breast cancer or blood clots (including heart attack or stroke)  In most cases, you should not use this medicine while you are pregnant  How to Use This Medicine:   Injectable  · A nurse or other health provider will give you this medicine  This medicine is given as a shot into a muscle (usually in the buttocks or upper arm) or just under the skin  · Your exact treatment schedule depends on the reason you are using this medicine  You doctor will explain your personal schedule  ? For treatment of cancer symptoms, you may start with a shot once per week  You may need fewer shots as your treatment goes forward  ? For birth control or endometriosis, you will need a shot every 3 months (13 weeks)  ? You might need to have the first shot during the first 5 days of your normal menstrual period, to make sure you are not pregnant  If you have just had a baby, you may receive a shot 5 days after birth if you are not breastfeeding or 6 weeks after birth if you are breastfeeding  · Read and follow the patient instructions that come with this medicine  Talk to your doctor or pharmacist if you have any questions  · Missed dose: You must receive a shot every 3 months if you want to prevent pregnancy  Talk to your doctor or pharmacist if you do not receive your medicine on time, because you may need another form of birth control  Drugs and Foods to Avoid:   Ask your doctor or pharmacist before using any other medicine, including over-the-counter medicines, vitamins, and herbal products  · Some medicines can affect how medroxyprogesterone works  Tell your doctor if you are using any of the following:  ? Aminoglutethimide, bosentan, carbamazepine, felbamate, griseofulvin, mitotane, modafinil, nefazodone, oxcarbazepine, phenobarbital, phenytoin, rifabutin, rifampin, rifapentine, Jamari's wort, topiramate  ?  Medicine to treat an infection (including clarithromycin, itraconazole, ketoconazole, telithromycin, voriconazole)  ? Medicine to treat HIV/AIDS (including atazanavir, efavirenz, indinavir, nelfinavir, ritonavir, saquinavir)  Warnings While Using This Medicine:   · Tell your doctor right away if you think you have become pregnant  · Tell your doctor if you are breastfeeding, or if you have kidney disease, liver disease, asthma, diabetes, heart disease, seizures, migraine headaches, an eating disorder, osteoporosis, or a history of depression  Tell your doctor if you smoke  · This medicine may cause the following problems:  ? Weak or thin bones, especially with long-term use  ? Blood clots, which could lead to stroke, heart attack, eye or vision problems, or other serious problems  ? Possible increased risk of breast cancer  ? Injection site reactions  ? Liver problems  ? Changes in menstrual periods  ? Fluid retention (edema) and weight gain  · You should not use this medicine for long-term birth control unless you cannot use any other form of birth control  · This medicine will not protect you from HIV/AIDS or other sexually transmitted diseases  · Tell any doctor or dentist who treats you that you are using this medicine  This medicine may affect certain medical test results  · Your doctor will do lab tests at regular visits to check on the effects of this medicine  Keep all appointments    Possible Side Effects While Using This Medicine:   Call your doctor right away if you notice any of these side effects:  · Allergic reaction: Itching or hives, swelling in your face or hands, swelling or tingling in your mouth or throat, chest tightness, trouble breathing  · Chest pain, trouble breathing, or coughing up blood  · Dark urine or pale stools, nausea, vomiting, loss of appetite, stomach pain, yellow skin or eyes  · Heavy or nonstop vaginal bleeding  · Loss of vision, double vision  · Numbness or weakness on one side of your body, sudden or severe headache, problems with vision, speech, or walking  · Rapid weight gain, swelling in your hands, ankles, or feet  · Seizures  If you notice these less serious side effects, talk with your doctor:   · Light or missed monthly periods, spotting between periods  · Nervousness or dizziness  · Pain, redness, burning, swelling, or a lump under your skin where the shot was given  If you notice other side effects that you think are caused by this medicine, tell your doctor  Call your doctor for medical advice about side effects  You may report side effects to FDA at 3-253-DUO-9888    © Copyright [employer] 2022 Information is for End User's use only and may not be sold, redistributed or otherwise used for commercial purposes  The above information is an  only  It is not intended as medical advice for individual conditions or treatments  Talk to your doctor, nurse or pharmacist before following any medical regimen to see if it is safe and effective for you

## 2022-04-06 NOTE — PROGRESS NOTES
Assessment/Plan:    No problem-specific Assessment & Plan notes found for this encounter  Diagnoses and all orders for this visit:    Menorrhagia with regular cycle  -     Ambulatory Referral to Gynecology  -     US pelvis complete w transvaginal; Future    Pelvic pain  -     US pelvis complete w transvaginal; Future    Other orders  -     ibuprofen (MOTRIN) 800 mg tablet; Take 800 mg by mouth every 6 to 8 hours if needed for pain          Subjective:      Patient ID: Lucina Villatoro is a 51 y o  female  67-year-old AA, female presents for heavy menstrual bleeding with nickel size clots  Patient reports her menses have historically been irregular with occasional skips  Lasting 7 days, 1st 3-4 days are heavy with clots and cramping and back pain throughout the entire cycle  Patient has never had a transvaginal ultrasound  Patient reports needing to change pads frequently and soaking through her clothing at times  Patient denies pelvic pain, unusual vaginal discharge, unusual vaginal odor, denies bleeding with intercourse  We discussed causes of heavy menses such as unregulated thyroid, increased BMI, fibroids  Transvaginal ultrasound ordered  Patient recommended NSAID prophylactic therapy for next menses  Reviewed instructions for use  We discussed controlling bleeding with use of Mirena IUD, Depo-Provera, POP's, uterine ablation or Acessa procedure,  depending on ultrasound finding  patient is overdue for annual exam   Advised patient to make follow-up appointment for annual as well as discussing results of ultrasound management patient agrees with plan      The following portions of the patient's history were reviewed and updated as appropriate: allergies, current medications, past family history, past medical history, past social history, past surgical history and problem list     Review of Systems   Constitutional: Negative for chills, fatigue and fever     Eyes: Negative for visual disturbance  Respiratory: Negative for cough and shortness of breath  Cardiovascular: Negative for chest pain  Gastrointestinal: Negative for abdominal pain  Genitourinary: Negative for vaginal bleeding and vaginal discharge  Objective:      /76 (BP Location: Left arm, Patient Position: Sitting, Cuff Size: Standard)   Ht 5' 4" (1 626 m)   Wt 102 kg (224 lb)   LMP 03/31/2022 (Exact Date)   BMI 38 45 kg/m²          Physical Exam  Vitals and nursing note reviewed  Constitutional:       Appearance: Normal appearance  She is normal weight  HENT:      Head: Normocephalic and atraumatic  Eyes:      Conjunctiva/sclera: Conjunctivae normal    Cardiovascular:      Rate and Rhythm: Normal rate  Pulmonary:      Effort: Pulmonary effort is normal    Genitourinary:     General: Normal vulva  Exam position: Lithotomy position  Dany stage (genital): 5  Labia:         Right: No rash, tenderness, lesion or injury  Left: No rash, tenderness, lesion or injury  Vagina: Normal       Cervix: Normal       Uterus: Normal        Adnexa: Left adnexa normal         Right: Tenderness present  Musculoskeletal:         General: Normal range of motion  Cervical back: Normal range of motion  Lymphadenopathy:      Lower Body: No right inguinal adenopathy  No left inguinal adenopathy  Skin:     General: Skin is warm and dry  Neurological:      Mental Status: She is alert  Psychiatric:         Mood and Affect: Mood normal          Behavior: Behavior normal          Thought Content:  Thought content normal          Judgment: Judgment normal        printed information given for patient to review on abnormal uterine  Bleeding, Mirena IUD, fibroids, uterine ablation, Acessa procedure  See after visit summary for use of NSAID prophylactic therapy  Follow-up after ultrasound for review and annual exam

## 2022-04-10 ENCOUNTER — HOSPITAL ENCOUNTER (OUTPATIENT)
Dept: SLEEP CENTER | Facility: CLINIC | Age: 47
Discharge: HOME | End: 2022-04-10
Payer: COMMERCIAL

## 2022-04-10 DIAGNOSIS — G47.30 SLEEP APNEA, UNSPECIFIED TYPE: ICD-10-CM

## 2022-04-10 DIAGNOSIS — R06.83 SNORING: ICD-10-CM

## 2022-04-10 PROCEDURE — 95810 POLYSOM 6/> YRS 4/> PARAM: CPT | Performed by: INTERNAL MEDICINE

## 2022-04-10 PROCEDURE — 95810 POLYSOM 6/> YRS 4/> PARAM: CPT

## 2022-04-11 NOTE — PROGRESS NOTES
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: Alcohol use:NO    Typical day for patient:YES       Study Documentation    Sleep Study Indications:     Sleep Study: Diagnostic   Snore:Severe  Supplemental O2: no    O2 flow rate (L/min) range   O2 flow rate (L/min) final   Minimum SaO2 77%  Baseline SaO2 94 7%        EKG abnormalities: no     EEG abnormalities: no    Sleep Study Recorded < 2 hours: N/A    Sleep Study Recorded > 2 hours but incomplete study: N/A    Sleep Study Recorded 6 hours but no sleep obtained: NO    Patient classification: employed       Post-Sleep Study    Medication used at bedtime or during sleep study:NO    Patient reports time it took to fall asleep:less than 20 minutes    Patient reports waking up during study:1 to 2 times  Patient reports returning to sleep without difficulty  Patient reports sleeping 4 to 6 hours without dreaming  Patient reports sleep during study:worse than usual    Patient rated sleepiness: Very sleepy or tired    PAP treatment:no

## 2022-04-20 ENCOUNTER — TELEPHONE (OUTPATIENT)
Dept: OBGYN CLINIC | Facility: CLINIC | Age: 47
End: 2022-04-20

## 2022-04-22 ENCOUNTER — TELEPHONE (OUTPATIENT)
Dept: SLEEP CENTER | Facility: CLINIC | Age: 47
End: 2022-04-22

## 2022-04-22 ENCOUNTER — HOSPITAL ENCOUNTER (OUTPATIENT)
Dept: ULTRASOUND IMAGING | Facility: HOSPITAL | Age: 47
Discharge: HOME | End: 2022-04-22
Payer: COMMERCIAL

## 2022-04-22 DIAGNOSIS — R10.2 PELVIC PAIN: ICD-10-CM

## 2022-04-22 DIAGNOSIS — N92.0 MENORRHAGIA WITH REGULAR CYCLE: ICD-10-CM

## 2022-04-22 PROCEDURE — 76830 TRANSVAGINAL US NON-OB: CPT

## 2022-04-22 PROCEDURE — 76856 US EXAM PELVIC COMPLETE: CPT

## 2022-04-22 NOTE — TELEPHONE ENCOUNTER
Left message for the patient to call back for sleep study results       Moderate LESTER   Patient needs to schedule consult

## 2022-04-25 NOTE — PROGRESS NOTES
Diagnoses and all orders for this visit:    Encounter for gynecological examination without abnormal finding      Health Maintenance:    Last PAP: 2 years ago at another facility no records   Next PAP Due: collected today     Last Mammogram: 2022    Next Mammogram: order given    Last Colonoscopy: referral given         Gardisil: Not completed     Subjective    CC: Yearly Exam      Lilly Scott is a 51 y o  female here for an annual exam  Bossman Renee  GYN hx includes:  c sections x 2 No personal Hx of breast, cervical, ovarian or colon CA  Family hx of:  No GYN cancers  Mother with esophageal cancer   Medically stable, reports no changes in medical Hx, follows with PMD      Her menstrual cycles are regular every 28-30 days  occasional skips  She reports issues with heavy bleeding or her menses  She had TVUS, no results as of today   Denies history of abnormal pap smear  She denies breast concerns, abnormal vaginal discharge, vaginal itching, odor, irritation, bowel/bladder dysfunction, urinary symptoms, pelvic pain, or dyspareunia today  She is sexually active  Monogamous relationship  Her current method of contraception includes none  Denies any issues with her BCM  Justin Tan She does not want STD testing today    Denies intimate partner violence    Past Medical History:   Diagnosis Date    Hypertension     Varicella      Past Surgical History:   Procedure Laterality Date     SECTION      x2        Immunization History   Administered Date(s) Administered    COVID-19 PFIZER VACCINE 0 3 ML IM 2021, 2021       Family History   Problem Relation Age of Onset    Esophageal cancer Mother     Heart disease Father     No Known Problems Sister     No Known Problems Daughter     No Known Problems Maternal Grandmother     No Known Problems Maternal Grandfather     No Known Problems Paternal Grandmother     No Known Problems Paternal Grandfather     No Known Problems Sister     No Known Problems Brother     No Known Problems Brother     No Known Problems Son     No Known Problems Maternal Aunt     No Known Problems Paternal Aunt     No Known Problems Paternal Aunt     No Known Problems Paternal Aunt     No Known Problems Paternal Aunt     No Known Problems Paternal Aunt      Social History     Tobacco Use    Smoking status: Never Smoker    Smokeless tobacco: Never Used   Vaping Use    Vaping Use: Never used   Substance Use Topics    Alcohol use: Not Currently    Drug use: Never       Current Outpatient Medications:     ibuprofen (MOTRIN) 800 mg tablet, Take 800 mg by mouth every 6 to 8 hours if needed for pain, Disp: , Rfl:     Iron-Vitamin C (Vitron-C)  MG TABS, Take 1 tablet by mouth 2 (two) times a day, Disp: 60 tablet, Rfl: 3    levothyroxine (Euthyrox) 50 mcg tablet, Take 1 tablet (50 mcg total) by mouth daily in the early morning, Disp: 90 tablet, Rfl: 3    pantoprazole (PROTONIX) 20 mg tablet, Take 1 tablet (20 mg total) by mouth daily before breakfast, Disp: 30 tablet, Rfl: 5    sertraline (Zoloft) 50 mg tablet, Take 1 tablet (50 mg total) by mouth daily, Disp: 30 tablet, Rfl: 5  Patient Active Problem List    Diagnosis Date Noted    LESTER (obstructive sleep apnea)     Snoring     Hypothyroidism 2022    Iron deficiency 2022    Nausea 2022    Anxiety and depression 2021    Fatigue 2021    Annual physical exam 2021    Menorrhagia with irregular cycle 12/10/2020    Right foot pain 12/10/2020    Encounter to establish care 2020       No Known Allergies    OB History    Para Term  AB Living   2 2 2         SAB IAB Ectopic Multiple Live Births                  # Outcome Date GA Lbr Db/2nd Weight Sex Delivery Anes PTL Lv   2 Term            1 Term                There were no vitals filed for this visit  There is no height or weight on file to calculate BMI      Review of Systems     Constitutional: Negative for chills, fatigue, fever, headaches, visual disturbances, and unexpected weight change  Respiratory: Negative for cough, & shortness of breath  Cardiovascular: Negative for chest pain       Gastrointestinal: Negative for Abd pain, nausea & vomiting, constipation and diarrhea  Genitourinary: Negative for difficulty urinating, dysuria, hematuria, dyspareunia, unusual vaginal bleeding or discharge  Skin: Negative skin changes    Physical Exam     Constitutional: Alert & Oriented x3, well-developed and well-nourished  No distress  HENT: Atraumatic, Normocephalic, Conjunctivae clear  Neck: Normal range of motion  Neck supple  No thyromegaly, mass, nodules or tenderness  Pulmonary: Effort normal  Lungs clear to ascultation bilateral  Cardiac: RRR, no murmur   Abdominal: Soft  No tenderness or masses  Musculoskeletal: Normal ROM  Skin: Warm & Dry  Psychological: Normal mood, thought content, behavior & judgement     Breasts:   Right: tissue soft without masses, tenderness, skin changes or nipple discharge  No areas of erythema or pain  No subclavicular, axillary, pectoral adenopathy  Skin tag in axilla area   Left:  tissue soft without masses, tenderness, skin changes or nipple discharge  No areas of erythema or pain  No subclavicular, axillary, pectoral adenopathy    Pelvic exam was performed with patient supine, lithotomy position  Labia: Negative rash, tenderness, lesion or injury on the right labia  Negative rash, tenderness, lesion or injury on the left labia  Urethral meatus:  Negative for  tenderness, inflammation or discharge  Uterus: not deviated, enlarged, fixed or tender  Cervix: No CMT, no discharge or friability  Right adnexa: no mass, no tenderness and no fullness  Left adnexa: no mass, no tenderness and no fullness  Vagina: No erythema, tenderness, masses, or foreign body in the vagina  No signs of injury around the vagina   No unusual vaginal discharge   Perineum without lesions, signs of injury, erythema or swelling  Inguinal Canal:        Right: No inguinal adenopathy or hernia present  Left: No inguinal adenopathy or hernia present  Large skin tag left buttock       Perineal hygiene reviewed   Weight bearing exercises minium of 150 mins/weekly advised  Kegel exercises recommended  SBE encouraged, ASCCP guidelines reviewed  Condoms encouraged with all sexual activity to prevent STI's  Gardisil vaccines recommended up to age 39  Calcium/ Vit D dietary requirements discussed,   Advised to call with any issues,  all concerns & questions addressed     See provided information in your after visit summary   Referral to Derm     F/U Annually and PRN

## 2022-04-25 NOTE — PATIENT INSTRUCTIONS
Breast Self Exam for Women   AMBULATORY CARE:   A breast self-exam (BSE)  is a way to check your breasts for lumps and other changes  Regular BSEs can help you know how your breasts normally look and feel  Most breast lumps or changes are not cancer, but you should always have them checked by a healthcare provider  Why you should do a BSE:  Breast cancer is the most common type of cancer in women  Even if you have mammograms, you may still want to do a BSE regularly  If you know how your breasts normally feel and look, it may help you know when to contact your healthcare provider  Mammograms can miss some cancers  You may find a lump during a BSE that did not show up on a mammogram   When you should do a BSE:  If you have periods, you may want to do your BSE 1 week after your period ends  This is the time when your breasts may be the least swollen, lumpy, or tender  You can do regular BSEs even if you are breastfeeding or have breast implants  Call your doctor if:   · You find any lumps or changes in your breasts  · You have breast pain or fluid coming from your nipples  · You have questions or concerns about your condition or care  How to do a BSE:       · Look at your breasts in a mirror  Look at the size and shape of each breast and nipple  Check for swelling, lumps, dimpling, scaly skin, or other skin changes  Look for nipple changes, such as a nipple that is painful or beginning to pull inward  Gently squeeze both nipples and check to see if fluid (that is not breast milk) comes out of them  If you find any of these or other breast changes, contact your healthcare provider  Check your breasts while you sit or  the following 3 positions:    ? Hang your arms down at your sides  ? Raise your hands and join them behind your head  ? Put firm pressure with your hands on your hips  Bend slightly forward while you look at your breasts in the mirror  · Lie down and feel your breasts    When you lie down, your breast tissue spreads out evenly over your chest  This makes it easier for you to feel for lumps and anything that may not be normal for your breasts  Do a BSE on one breast at a time  ? Place a small pillow or towel under your left shoulder  Put your left arm behind your head  ? Use the 3 middle fingers of your right hand  Use your fingertip pads, on the top of your fingers  Your fingertip pad is the most sensitive part of your finger  ? Use small circles to feel your breast tissue  Use your fingertip pads to make dime-sized, overlapping circles on your breast and armpits  Use light, medium, and firm pressure  First, press lightly  Second, press with medium pressure to feel a little deeper into the breast  Last, use firm pressure to feel deep within your breast     ? Examine your entire breast area  Examine the breast area from above the breast to below the breast where you feel only ribs  Make small circles with your fingertips, starting in the middle of your armpit  Make circles going up and down the breast area  Continue toward your breast and all the way across it  Examine the area from your armpit all the way over to the middle of your chest (breastbone)  Stop at the middle of your chest     ? Move the pillow or towel to your right shoulder, and put your right arm behind your head  Use the 3 fingertip pads of your left hand, and repeat the above steps to do a BSE on your right breast     What else you can do to check for breast problems or cancer:  Talk to your healthcare provider about mammograms  A mammogram is an x-ray of your breasts to screen for breast cancer or other problems  Your provider can tell you the benefits and risks of mammograms  The first mammogram is usually at age 39 or 48  Your provider may recommend you start at 36 or younger if your risk for breast cancer is high  Mammograms usually continue every 1 to 2 years until age 76         Follow up with your doctor as directed:  Write down your questions so you remember to ask them during your visits  © Copyright [employer] 2022 Information is for End User's use only and may not be sold, redistributed or otherwise used for commercial purposes  All illustrations and images included in CareNotes® are the copyrighted property of [employer]  or Laura Suh  The above information is an  only  It is not intended as medical advice for individual conditions or treatments  Talk to your doctor, nurse or pharmacist before following any medical regimen to see if it is safe and effective for you  Wellness Visit for Adults   AMBULATORY CARE:   A wellness visit  is when you see your healthcare provider to get screened for health problems  Your healthcare provider will also give you advice on how to stay healthy  Write down your questions so you remember to ask them  Ask your healthcare provider how often you should have a wellness visit  What happens at a wellness visit:  Your healthcare provider will ask about your health, and your family history of health problems  This includes high blood pressure, heart disease, and cancer  He or she will ask if you have symptoms that concern you, if you smoke, and about your mood  You may also be asked about your intake of medicines, supplements, food, and alcohol  Any of the following may be done:  · Your weight  will be checked  Your height may also be checked so your body mass index (BMI) can be calculated  Your BMI shows if you are at a healthy weight  · Your blood pressure  and heart rate will be checked  Your temperature may also be checked  · Blood and urine tests  may be done  Blood tests may be done to check your cholesterol levels  Abnormal cholesterol levels increase your risk for heart disease and stroke  You may also need a blood or urine test to check for diabetes if you are at increased risk   Urine tests may be done to look for signs of an infection or kidney disease  · A physical exam  includes checking your heartbeat and lungs with a stethoscope  Your healthcare provider may also check your skin to look for sun damage  · Screening tests  may be recommended  A screening test is done to check for diseases that may not cause symptoms  The screening tests you may need depend on your age, gender, family history, and lifestyle habits  For example, colorectal screening may be recommended if you are 48years old or older  Screening tests you need if you are a woman:   · A Pap smear  is used to screen for cervical cancer  Pap smears are usually done every 3 to 5 years depending on your age  You may need them more often if you have had abnormal Pap smear test results in the past  Ask your healthcare provider how often you should have a Pap smear  · A mammogram  is an x-ray of your breasts to screen for breast cancer  Experts recommend mammograms every 2 years starting at age 48 years  You may need a mammogram at age 52 years or younger if you have an increased risk for breast cancer  Talk to your healthcare provider about when you should start having mammograms and how often you need them  Vaccines you may need:   · Get an influenza vaccine  every year  The influenza vaccine protects you from the flu  Several types of viruses cause the flu  The viruses change over time, so new vaccines are made each year  · Get a tetanus-diphtheria (Td) booster vaccine  every 10 years  This vaccine protects you against tetanus and diphtheria  Tetanus is a severe infection that may cause painful muscle spasms and lockjaw  Diphtheria is a severe bacterial infection that causes a thick covering in the back of your mouth and throat  · Get a human papillomavirus (HPV) vaccine  if you are female and aged 23 to 32 or male 23 to 24 and never received it  This vaccine protects you from HPV infection  HPV is the most common infection spread by sexual contact   HPV may also cause vaginal, penile, and anal cancers  · Get a pneumococcal vaccine  if you are aged 72 years or older  The pneumococcal vaccine is an injection given to protect you from pneumococcal disease  Pneumococcal disease is an infection caused by pneumococcal bacteria  The infection may cause pneumonia, meningitis, or an ear infection  · Get a shingles vaccine  if you are 60 or older, even if you have had shingles before  The shingles vaccine is an injection to protect you from the varicella-zoster virus  This is the same virus that causes chickenpox  Shingles is a painful rash that develops in people who had chickenpox or have been exposed to the virus  How to eat healthy:  My Plate is a model for planning healthy meals  It shows the types and amounts of foods that should go on your plate  Fruits and vegetables make up about half of your plate, and grains and protein make up the other half  A serving of dairy is included on the side of your plate  The amount of calories and serving sizes you need depends on your age, gender, weight, and height  Examples of healthy foods are listed below:  · Eat a variety of vegetables  such as dark green, red, and orange vegetables  You can also include canned vegetables low in sodium (salt) and frozen vegetables without added butter or sauces  · Eat a variety of fresh fruits , canned fruit in 100% juice, frozen fruit, and dried fruit  · Include whole grains  At least half of the grains you eat should be whole grains  Examples include whole-wheat bread, wheat pasta, brown rice, and whole-grain cereals such as oatmeal     · Eat a variety of protein foods such as seafood (fish and shellfish), lean meat, and poultry without skin (turkey and chicken)  Examples of lean meats include pork leg, shoulder, or tenderloin, and beef round, sirloin, tenderloin, and extra lean ground beef   Other protein foods include eggs and egg substitutes, beans, peas, soy products, nuts, and seeds  · Choose low-fat dairy products such as skim or 1% milk or low-fat yogurt, cheese, and cottage cheese  · Limit unhealthy fats  such as butter, hard margarine, and shortening  Exercise:  Exercise at least 30 minutes per day on most days of the week  Some examples of exercise include walking, biking, dancing, and swimming  You can also fit in more physical activity by taking the stairs instead of the elevator or parking farther away from stores  Include muscle strengthening activities 2 days each week  Regular exercise provides many health benefits  It helps you manage your weight, and decreases your risk for type 2 diabetes, heart disease, stroke, and high blood pressure  Exercise can also help improve your mood  Ask your healthcare provider about the best exercise plan for you  General health and safety guidelines:   · Do not smoke  Nicotine and other chemicals in cigarettes and cigars can cause lung damage  Ask your healthcare provider for information if you currently smoke and need help to quit  E-cigarettes or smokeless tobacco still contain nicotine  Talk to your healthcare provider before you use these products  · Limit alcohol  A drink of alcohol is 12 ounces of beer, 5 ounces of wine, or 1½ ounces of liquor  · Lose weight, if needed  Being overweight increases your risk of certain health conditions  These include heart disease, high blood pressure, type 2 diabetes, and certain types of cancer  · Protect your skin  Do not sunbathe or use tanning beds  Use sunscreen with a SPF 15 or higher  Apply sunscreen at least 15 minutes before you go outside  Reapply sunscreen every 2 hours  Wear protective clothing, hats, and sunglasses when you are outside  · Drive safely  Always wear your seatbelt  Make sure everyone in your car wears a seatbelt  A seatbelt can save your life if you are in an accident  Do not use your cell phone when you are driving   This could distract you and cause an accident  Pull over if you need to make a call or send a text message  · Practice safe sex  Use latex condoms if are sexually active and have more than one partner  Your healthcare provider may recommend screening tests for sexually transmitted infections (STIs)  · Wear helmets, lifejackets, and protective gear  Always wear a helmet when you ride a bike or motorcycle, go skiing, or play sports that could cause a head injury  Wear protective equipment when you play sports  Wear a lifejacket when you are on a boat or doing water sports  © Copyright [employer] 2022 Information is for End User's use only and may not be sold, redistributed or otherwise used for commercial purposes  All illustrations and images included in CareNotes® are the copyrighted property of A [employer] M [employer] Inc  or Laura Álvarez   The above information is an  only  It is not intended as medical advice for individual conditions or treatments  Talk to your doctor, nurse or pharmacist before following any medical regimen to see if it is safe and effective for you  Perineal Hygiene     No soaps or feminine wash to the vulva  Use only water to cleanse, or water with Dove or [employer] if necessary  No lotion to the area  Use only coconut oil for moisture if needed   No douching     Cotton underware, loose fitting clothing  Only perfume-free, dye-free laundry detergent, use a second rinse cycle   Avoid fabric softeners/dryer sheets  Coconut oil as a lubricant (if not using condoms) or another scent-free lubricant (Astroglide, Uberlube) if needed  Partner to avoid the same products as well  Over the counter probiotic to restore vaginal yosi may be helpful as well     You may also look into Boric Acid vaginal suppositories to restore vaginal PH balance for up to 2 weeks as directed on the box   You may not use these if you are pregnant  Kegel Exercises for Women   AMBULATORY CARE:   Kegel exercises  help strengthen your pelvic muscles  Pelvic muscles hold your pelvic organs, such as your bladder and uterus, in place  Kegel exercises help prevent or control problems with urine incontinence (leakage)  Incontinence may be caused by pregnancy, childbirth, or menopause  Contact your healthcare provider if:   · You cannot feel your muscles tighten or relax  · You continue to leak urine  · You have questions or concerns about your condition or care  Use the correct muscles:  Pelvic muscles are the muscles you use to control urine flow  To target these muscles, stop and start the flow of urine several times  This will help you become familiar with how it feels to tighten and relax these muscles  How to do Kegel exercises:   · Empty your bladder  You may lie down, stand up, or sit down to do these exercises  When you first try to do these exercises, it may be easier if you lie down  Tighten or squeeze your pelvic muscles slowly  It may feel like you are trying to hold back urine or gas  Hold this position for 3 seconds  Relax for 3 seconds  Repeat this cycle 10 times  · Do 10 sets of Kegel exercises, at least 3 times a day  Do not hold your breath when you do Kegel exercises  Keep your stomach, back, and leg muscles relaxed  · As your muscles get stronger, you will be able to hold the squeeze longer  Your healthcare provider may ask that you increase your pelvic muscle squeeze to 10 seconds  After you squeeze for 10 seconds, relax for 10 seconds  What else you should know:   · Once you know how to do Kegel exercises, use different positions  You can do these exercises while you lie on the floor, sit at your desk or watch TV, and while you stand  · You may notice improved bladder control within about 6 weeks  · Tighten your pelvic muscles before you sneeze, cough, or lift to prevent urine leakage      Follow up with your doctor as directed:  Write down your questions so you remember to ask them during your visits  © Copyright "[employer] 2022 Information is for End User's use only and may not be sold, redistributed or otherwise used for commercial purposes  All illustrations and images included in CareNotes® are the copyrighted property of [employer] M [employer] Inc  or Laura Suh  The above information is an  only  It is not intended as medical advice for individual conditions or treatments  Talk to your doctor, nurse or pharmacist before following any medical regimen to see if it is safe and effective for you  For vaginal dryness: You may use:     Coconut oil (organic, pure, unscented) as needed for moisture or lubrication  ( Do not use if allergic)       Replens moisture restore external comfort gel daily ( use as directed on the box)        Replens long lasting vaginal moisturizer  ( use as directed on the box)       For Vaginal Lubrication:        You may use:     Coconut oil (organic, pure, unscented) as needed for lubrication during intercourse  (Do not use if allergic)               Replens silky smooth lubricant, premium silicone based lubricant for intercourse  ( use as directed, a small amount will provide an enhanced natural feeling)     Any premium over the counter vaginal lubricant water or silicone based  Silicone based will have more staying power  For Vulvar hygiene:     No soaps or feminine wash to the vulva with the exception of Dove or Dove Sensitive Skin bar soap if necessary  Only perfume-free, dye-free laundry detergent, use a second rinse cycle  Avoid fabric softeners/dryer sheets  No lotion to the area  No Douching   Coconut oil as a lubricant (if not using condoms) or another scent-free premium lubricant  Loose fitting cotton underwear and loose fitting outer clothing   Partner to avoid the same products as well  Over the counter probiotic taken orally may help to restore vaginal yosi       Menopause WHAT YOU NEED TO KNOW:   What is menopause? Menopause is a normal stage in a woman's life when her monthly periods stop  You are considered to be in menopause when you have not had a period for a full year after the age of 36  Menopause usually occurs between ages 52 to 48  Perimenopause is a stage before menopause that may cause signs and symptoms similar to menopause  Perimenopause may start about 4 years before menopause  What causes menopause? Menopause starts when the ovaries stop making the female hormones estrogen and progesterone  After menopause, you are no longer able to become pregnant  Any of the following may trigger menopause or early menopause:  · Surgery, including a hysterectomy or oophorectomy    · Family history of early menopause    · Smoking    · Chemotherapy or pelvic radiation    · Chromosome abnormalities, including Tillman syndrome and Fragile X syndrome    · Premature ovarian insufficiency (the ovaries stop producing eggs before age 36)    What are the signs and symptoms of menopause? You may have any of the following:  · Irregular menstrual cycles with heavy vaginal bleeding followed by decreased bleeding until it stops    · Hot flashes (feeling warm, flushed, and sweaty)    · Vaginal changes such as increased dryness    · Mood changes such as anxiety, depression, or decreased desire to have sex    · Trouble sleeping, joint pain, headaches    · Brittle nails, hair on chin or chest where it is normally absent    · Decrease in breast size and change in skin texture    · Weight gain    How is menopause treated or managed? · Hormone replacement therapy (HRT) is medicine that replaces your low hormone levels  HRT contains estrogen and sometimes progestin  ? HRT has several benefits  HRT helps prevent osteoporosis, which decreases your risk for bone fractures  HRT also protects you from colorectal cancer  ? HRT also has some risks    HRT increases your risk for breast cancer, blood clots, heart disease, a heart attack, or a stroke  If you are 72 years or older, HRT can also increase your risk for dementia  Your risk for uterine or endometrial cancer is higher if you take estrogen-only HRT  · Manage hot flashes  Hot flashes are brief periods of feeling very warm, flushed, and sweaty  Hot flashes can last from a few seconds to several minutes  They may happen many times during the day, and are common at night  Layer your clothing so that you can easily remove some clothing and cool yourself during a hot flash  Cold drinks may also be helpful  Non-hormone medicines can help relieve or prevent hot flashes  Examples include certain antidepressants, nerve medicines, and high blood pressure medicines  · Reduce vaginal dryness by using over-the-counter vaginal creams  Vaginal dryness may cause you to have pain or discomfort during sex  Only use creams that are made for vaginal use  Do  not  use petroleum jelly  You may put an estrogen cream in and around your vagina  Estrogen cream may help decrease vaginal dryness and lower your risk of vaginal infections  · Continue to use birth control during perimenopause if you do not want to get pregnant  You may need to use birth control until it has been 1 year since your periods stopped  Ask your healthcare provider when you can stop using birth control to prevent pregnancy  How can I live a healthy lifestyle during and after menopause? After menopause, your risk for heart disease and bone loss increases  Ask about these and other ways to stay healthy:  · Exercise regularly  Exercise helps you maintain a healthy weight  Exercise can also help to control your blood pressure and cholesterol levels  Include weight-bearing exercise for strong bones  Weight bearing exercise is recommended for at least 30 minutes, 3 times a week  Ask your healthcare provider about the best exercise plan for you  · Eat a variety of healthy foods  Include fruits, vegetables, whole grains (whole-wheat bread, pasta, and cereals), low-fat dairy, and lean protein foods (beans, poultry, and fish)  Limit foods high in sodium (salt)  Ask your healthcare provider for more information about a meal plan that is right for you  · Do not smoke  If you smoke, it is never too late to quit  You are more likely to have a heart attack, lung disease, blood clots, and cancer if you smoke  Ask your healthcare provider for information if you need help quitting  · Take supplements as directed  You may need extra calcium and vitamin D to help prevent osteoporosis  · Limit alcohol and caffeine  Alcohol and caffeine may worsen your symptoms  When should I call my doctor? · You have vaginal bleeding after menopause  · You have questions or concerns about your condition or care  CARE AGREEMENT:   You have the right to help plan your care  Learn about your health condition and how it may be treated  Discuss treatment options with your healthcare providers to decide what care you want to receive  You always have the right to refuse treatment  The above information is an  only  It is not intended as medical advice for individual conditions or treatments  Talk to your doctor, nurse or pharmacist before following any medical regimen to see if it is safe and effective for you  © Copyright [employer] 2022 Information is for End User's use only and may not be sold, redistributed or otherwise used for commercial purposes  All illustrations and images included in CareNotes® are the copyrighted property of A [employer] M [employer]  or Laura Álvarez     Vaginal Atrophy   AMBULATORY CARE:   Vaginal atrophy  is a condition that causes thinning, drying, and inflammation of vaginal tissue  This condition is caused by decreased levels of estrogen (a female sex hormone)  Vaginal atrophy can increase your risk for vaginal and urinary tract infections  Vaginal atrophy can worsen over time if not treated  Common signs and symptoms include the following:   · Vaginal dryness, itching, and burning    · Vaginal discharge    · Pain or discomfort during sex    · Light bleeding after sex    · Burning during urination    · Frequent, sudden, strong urges to urinate    · Urinary incontinence (loss of control of your bladder)    Contact your healthcare provider if:   · You have a foul-smelling odor coming from your vagina  · You have a thick, cheese-like discharge from your vagina  · You have itching, swelling, or redness in your vagina  · You have pain or burning when you urinate  · Your urine smells bad  · Your symptoms do not improve, or they get worse  · You have questions or concerns about your condition or care  Treatment:   · Over-the counter vaginal moisturizers  can help reduce dryness  Your healthcare provider may recommend that you use a vaginal moisturizer several times each week and during sex  Only use creams that are made for vaginal use  Do  not  use petroleum jelly  Lubricants can be used during sex to decrease pain and discomfort  · Estrogen  may help decrease dryness  It may also lower your risk of vaginal infections if you are going through menopause  It can also help to relieve urinary symptoms  Estrogen may be prescribed in the form of a cream, tablet, or ring  These medicines can be applied or inserted into the vagina  Estrogen can also be prescribed in the form of a pill  Follow up with your doctor as directed:  Write down your questions so you remember to ask them during your visits  © [employer] 2022 Information is for End User's use only and may not be sold, redistributed or otherwise used for commercial purposes  All illustrations and images included in CareNotes® are the copyrighted property of [employer]  or Laura Álvarez   The above information is an  only   It is not intended as medical advice for individual conditions or treatments  Talk to your doctor, nurse or pharmacist before following any medical regimen to see if it is safe and effective for you

## 2022-04-27 ENCOUNTER — ANNUAL EXAM (OUTPATIENT)
Dept: OBGYN CLINIC | Facility: CLINIC | Age: 47
End: 2022-04-27
Payer: COMMERCIAL

## 2022-04-27 VITALS
BODY MASS INDEX: 38.76 KG/M2 | WEIGHT: 227 LBS | HEIGHT: 64 IN | DIASTOLIC BLOOD PRESSURE: 80 MMHG | SYSTOLIC BLOOD PRESSURE: 134 MMHG

## 2022-04-27 DIAGNOSIS — Z01.419 ENCOUNTER FOR GYNECOLOGICAL EXAMINATION WITHOUT ABNORMAL FINDING: Primary | ICD-10-CM

## 2022-04-27 DIAGNOSIS — Z12.31 ENCOUNTER FOR SCREENING MAMMOGRAM FOR MALIGNANT NEOPLASM OF BREAST: ICD-10-CM

## 2022-04-27 DIAGNOSIS — Z12.11 COLON CANCER SCREENING: ICD-10-CM

## 2022-04-27 DIAGNOSIS — K64.4 ANORECTAL SKIN TAGS: ICD-10-CM

## 2022-04-27 PROCEDURE — G0476 HPV COMBO ASSAY CA SCREEN: HCPCS | Performed by: OBSTETRICS & GYNECOLOGY

## 2022-04-27 PROCEDURE — G0145 SCR C/V CYTO,THINLAYER,RESCR: HCPCS | Performed by: OBSTETRICS & GYNECOLOGY

## 2022-04-27 PROCEDURE — 99396 PREV VISIT EST AGE 40-64: CPT | Performed by: OBSTETRICS & GYNECOLOGY

## 2022-04-27 NOTE — TELEPHONE ENCOUNTER
Returned patient's call and left voice message  Advised sleep study shows moderate LESTER and her PCP Arabella Carr is recommending follow up with a sleep specialist   Urbano Hatchet she can call Dr Prasad Alvarenga in [facility] which is nearest to her home at [phone]  Provided nurse line phone number to call with any questions

## 2022-04-29 LAB
HPV HR 12 DNA CVX QL NAA+PROBE: NEGATIVE
HPV16 DNA CVX QL NAA+PROBE: NEGATIVE
HPV18 DNA CVX QL NAA+PROBE: NEGATIVE

## 2022-05-03 ENCOUNTER — TELEPHONE (OUTPATIENT)
Dept: OBGYN CLINIC | Facility: CLINIC | Age: 47
End: 2022-05-03

## 2022-05-03 NOTE — TELEPHONE ENCOUNTER
Spoke with patient and reviewed ultrasound findings  Patient is aware that she has several small fibroids that could be the cause of her heavy menstrual bleeding verses perimenopause  Previous visits we discussed at length her options for management  We reviewed them again today starting with NSAID prophylactic therapy, TXA, Depo-Provera injection, POP, Mirena IUD, or surgical intervention  Patient understands all of the above and will discuss with her  further  If she decides on Depo she will call for prescription to be entered that if she decides on Mirena she will call for scheduled insertion    Patient grateful for phone call and all information

## 2022-05-04 LAB
LAB AP GYN PRIMARY INTERPRETATION: NORMAL
Lab: NORMAL

## 2022-05-12 ENCOUNTER — TELEPHONE (OUTPATIENT)
Dept: PSYCHIATRY | Facility: CLINIC | Age: 47
End: 2022-05-12

## 2022-05-12 NOTE — TELEPHONE ENCOUNTER
Pt  was calling to see if pt was still on the wait list and  I let him know she was on the wait list  And I also asked if they where open to vv  Because we are starting the therapy anywhere  They said yes they are good with vv

## 2022-05-13 ENCOUNTER — OFFICE VISIT (OUTPATIENT)
Dept: FAMILY MEDICINE CLINIC | Facility: CLINIC | Age: 47
End: 2022-05-13
Payer: COMMERCIAL

## 2022-05-13 ENCOUNTER — HOSPITAL ENCOUNTER (OUTPATIENT)
Dept: RADIOLOGY | Facility: HOSPITAL | Age: 47
Discharge: HOME | End: 2022-05-13
Payer: COMMERCIAL

## 2022-05-13 VITALS
OXYGEN SATURATION: 98 % | BODY MASS INDEX: 39.3 KG/M2 | WEIGHT: 230.2 LBS | SYSTOLIC BLOOD PRESSURE: 120 MMHG | HEART RATE: 68 BPM | TEMPERATURE: 98.9 F | DIASTOLIC BLOOD PRESSURE: 82 MMHG | HEIGHT: 64 IN

## 2022-05-13 DIAGNOSIS — G89.29 CHRONIC BILATERAL LOW BACK PAIN WITH SCIATICA, SCIATICA LATERALITY UNSPECIFIED: ICD-10-CM

## 2022-05-13 DIAGNOSIS — M54.40 CHRONIC BILATERAL LOW BACK PAIN WITH SCIATICA, SCIATICA LATERALITY UNSPECIFIED: ICD-10-CM

## 2022-05-13 DIAGNOSIS — M62.838 MUSCLE SPASMS OF BOTH LOWER EXTREMITIES: Primary | ICD-10-CM

## 2022-05-13 PROBLEM — M54.42 CHRONIC BILATERAL LOW BACK PAIN WITH SCIATICA: Status: ACTIVE | Noted: 2022-05-13

## 2022-05-13 PROBLEM — M54.41 CHRONIC BILATERAL LOW BACK PAIN WITH SCIATICA: Status: ACTIVE | Noted: 2022-05-13

## 2022-05-13 PROCEDURE — 99213 OFFICE O/P EST LOW 20 MIN: CPT | Performed by: NURSE PRACTITIONER

## 2022-05-13 PROCEDURE — 72110 X-RAY EXAM L-2 SPINE 4/>VWS: CPT

## 2022-05-13 RX ORDER — METHOCARBAMOL 500 MG/1
500 TABLET, FILM COATED ORAL 4 TIMES DAILY
Qty: 30 TABLET | Refills: 0 | Status: SHIPPED | OUTPATIENT
Start: 2022-05-13

## 2022-05-13 NOTE — PROGRESS NOTES
Assessment/Plan:     Muscle spasms of both lower extremities  Patient has lower back pain  Discussed management of symptoms  Advised to continue to wear supportive brace, compression stocking, supportive shoes  Will get x-ray done  Advised to use muscle rub  May use Robaxin  Elevate legs as much as possible  Declined physical therapy at this time  Problem List Items Addressed This Visit        Nervous and Auditory    Chronic bilateral low back pain with sciatica    Relevant Orders    XR spine lumbar minimum 4 views non injury       Other    Muscle spasms of both lower extremities - Primary     Patient has lower back pain  Discussed management of symptoms  Advised to continue to wear supportive brace, compression stocking, supportive shoes  Will get x-ray done  Advised to use muscle rub  May use Robaxin  Elevate legs as much as possible  Declined physical therapy at this time  Relevant Medications    methocarbamol (ROBAXIN) 500 mg tablet    Diclofenac Sodium (VOLTAREN) 1 %            Subjective:      Patient ID: Michael Valdez is a 51 y o  female  Patient seen with complaints of lower back pain  Patient works at [employer] and is on her feet for more than 8 hours  Reports it is very stressful, day always short staffed  Does have some spasming on her legs  Has a lot of muscle tightness  The following portions of the patient's history were reviewed and updated as appropriate: allergies, current medications, past family history, past medical history, past social history, past surgical history and problem list     Review of Systems      Objective:      /82 (BP Location: Left arm, Patient Position: Sitting, Cuff Size: Standard)   Pulse 68   Temp 98 9 °F (37 2 °C) (Tympanic)   Ht 5' 4" (1 626 m)   Wt 104 kg (230 lb 3 2 oz)   SpO2 98%   BMI 39 51 kg/m²          Physical Exam  Vitals and nursing note reviewed     Constitutional:       Appearance: She is well-developed  She is obese  HENT:      Head: Normocephalic and atraumatic  Eyes:      Pupils: Pupils are equal, round, and reactive to light  Cardiovascular:      Rate and Rhythm: Normal rate and regular rhythm  Pulses: Normal pulses  Heart sounds: Normal heart sounds  Pulmonary:      Effort: Pulmonary effort is normal       Breath sounds: Normal breath sounds  Musculoskeletal:         General: Tenderness present  Normal range of motion  Cervical back: Normal range of motion  Right lower leg: No edema  Left lower leg: Edema (Patient has old ankle injury) present  Skin:     General: Skin is warm and dry  Neurological:      Mental Status: She is alert and oriented to person, place, and time  Psychiatric:         Mood and Affect: Mood normal          Behavior: Behavior normal          Thought Content:  Thought content normal          Judgment: Judgment normal

## 2022-05-13 NOTE — ASSESSMENT & PLAN NOTE
Patient has lower back pain  Discussed management of symptoms  Advised to continue to wear supportive brace, compression stocking, supportive shoes  Will get x-ray done  Advised to use muscle rub  May use Robaxin  Elevate legs as much as possible  Declined physical therapy at this time

## 2022-05-13 NOTE — PATIENT INSTRUCTIONS
Use heat, muscle rub  Use Voltaren gel  Use robaxin before  Get xray  Continue brace, compression stockings, good shoes

## 2022-05-16 ENCOUNTER — TELEPHONE (OUTPATIENT)
Dept: PULMONOLOGY | Facility: CLINIC | Age: 47
End: 2022-05-16

## 2022-05-16 NOTE — TELEPHONE ENCOUNTER
Lvm for pt in regards to scheduling an appointment & informed we do not PAR with her insurance, [employer], confirmed by my [facility] list

## 2022-05-17 ENCOUNTER — TELEPHONE (OUTPATIENT)
Dept: SLEEP CENTER | Facility: CLINIC | Age: 47
End: 2022-05-17

## 2022-05-17 NOTE — TELEPHONE ENCOUNTER
----- Message from Andrews Yancey MD sent at 5/16/2022  5:34 PM EDT -----  Approved    ----- Message -----  From: Honey Thomas  Sent: 4/5/2022  11:26 AM EDT  To: Sleep Medicine Nisha Fuentes Provider    This sleep study needs approval      If approved please sign and return to clerical pool  If denied please include reasons why  Also provide alternative testing if warranted  Please sign and return to clerical pool

## 2022-06-03 ENCOUNTER — TELEPHONE (OUTPATIENT)
Dept: FAMILY MEDICINE CLINIC | Facility: CLINIC | Age: 47
End: 2022-06-03

## 2022-06-03 DIAGNOSIS — M51.37 DEGENERATIVE DISC DISEASE AT L5-S1 LEVEL: Primary | ICD-10-CM

## 2022-06-27 ENCOUNTER — TELEPHONE (OUTPATIENT)
Dept: GASTROENTEROLOGY | Facility: CLINIC | Age: 47
End: 2022-06-27

## 2022-06-27 NOTE — TELEPHONE ENCOUNTER
06/27/22  Screened by: Terry Herman    Referring Provider     Pre- Screening: There is no height or weight on file to calculate BMI  Has patient been referred for a routine screening Colonoscopy? yes  Is the patient between 39-70 years old? yes      Previous Colonoscopy no   If yes:    Date:     Facility:     Reason:       SCHEDULING STAFF: If the patient is between 45yrs-49yrs, please advise patient to confirm benefits/coverage with their insurance company for a routine screening colonoscopy, some insurance carriers will only cover at [phone] or older  If the patient is over 66years old, please schedule an office visit  Does the patient want to see a Gastroenterologist prior to their procedure OR are they having any GI symptoms? no    Has the patient been hospitalized or had abdominal surgery in the past 6 months? no    Does the patient use supplemental oxygen? no    Does the patient take Coumadin, Lovenox, Plavix, Elliquis, Xarelto, or other blood thinning medication? no    Has the patient had a stroke, cardiac event, or stent placed in the past year? no       PT PASSED OA AND CAN BE REACHED [phone]  PT ASKING TO BE SCHEDULED IN [place]  SCHEDULING STAFF: If patient answers NO to above questions, then schedule procedure  If patient answers YES to above questions, then schedule office appointment  If patient is between 45yrs - 49yrs, please advise patient that we will have to confirm benefits & coverage with their insurance company for a routine screening colonoscopy

## 2022-07-01 ENCOUNTER — OFFICE VISIT (OUTPATIENT)
Dept: OBGYN CLINIC | Facility: CLINIC | Age: 47
End: 2022-07-01
Payer: COMMERCIAL

## 2022-07-01 ENCOUNTER — TELEPHONE (OUTPATIENT)
Dept: GASTROENTEROLOGY | Facility: CLINIC | Age: 47
End: 2022-07-01

## 2022-07-01 ENCOUNTER — PREP FOR PROCEDURE (OUTPATIENT)
Dept: GASTROENTEROLOGY | Facility: CLINIC | Age: 47
End: 2022-07-01

## 2022-07-01 VITALS
HEART RATE: 72 BPM | SYSTOLIC BLOOD PRESSURE: 134 MMHG | WEIGHT: 230.6 LBS | BODY MASS INDEX: 39.37 KG/M2 | DIASTOLIC BLOOD PRESSURE: 84 MMHG | HEIGHT: 64 IN

## 2022-07-01 DIAGNOSIS — M62.9 HAMSTRING TIGHTNESS OF BOTH LOWER EXTREMITIES: ICD-10-CM

## 2022-07-01 DIAGNOSIS — Z12.11 SCREENING FOR MALIGNANT NEOPLASM OF COLON: Primary | ICD-10-CM

## 2022-07-01 DIAGNOSIS — R19.8 ABDOMINAL WEAKNESS: ICD-10-CM

## 2022-07-01 DIAGNOSIS — M17.12 PRIMARY OSTEOARTHRITIS OF LEFT KNEE: ICD-10-CM

## 2022-07-01 DIAGNOSIS — S39.012A LUMBAR STRAIN, INITIAL ENCOUNTER: ICD-10-CM

## 2022-07-01 DIAGNOSIS — M51.36 DEGENERATIVE DISC DISEASE, LUMBAR: ICD-10-CM

## 2022-07-01 DIAGNOSIS — M53.3 SACROILIAC JOINT DYSFUNCTION: ICD-10-CM

## 2022-07-01 DIAGNOSIS — M47.816 LUMBAR FACET ARTHROPATHY: Primary | ICD-10-CM

## 2022-07-01 DIAGNOSIS — R29.898 WEAKNESS OF BOTH HIPS: ICD-10-CM

## 2022-07-01 PROCEDURE — 20610 DRAIN/INJ JOINT/BURSA W/O US: CPT | Performed by: FAMILY MEDICINE

## 2022-07-01 PROCEDURE — 99244 OFF/OP CNSLTJ NEW/EST MOD 40: CPT | Performed by: FAMILY MEDICINE

## 2022-07-01 RX ORDER — BUPIVACAINE HYDROCHLORIDE 2.5 MG/ML
2 INJECTION, SOLUTION INFILTRATION; PERINEURAL
Status: COMPLETED | OUTPATIENT
Start: 2022-07-01 | End: 2022-07-01

## 2022-07-01 RX ORDER — TRIAMCINOLONE ACETONIDE 40 MG/ML
40 INJECTION, SUSPENSION INTRA-ARTICULAR; INTRAMUSCULAR
Status: COMPLETED | OUTPATIENT
Start: 2022-07-01 | End: 2022-07-01

## 2022-07-01 RX ORDER — MELOXICAM 15 MG/1
15 TABLET ORAL DAILY
Qty: 30 TABLET | Refills: 1 | Status: SHIPPED | OUTPATIENT
Start: 2022-07-01

## 2022-07-01 RX ORDER — LIDOCAINE HYDROCHLORIDE 10 MG/ML
2 INJECTION, SOLUTION INFILTRATION; PERINEURAL
Status: COMPLETED | OUTPATIENT
Start: 2022-07-01 | End: 2022-07-01

## 2022-07-01 RX ORDER — LIDOCAINE HYDROCHLORIDE 10 MG/ML
3 INJECTION, SOLUTION INFILTRATION; PERINEURAL
Status: COMPLETED | OUTPATIENT
Start: 2022-07-01 | End: 2022-07-01

## 2022-07-01 RX ADMIN — LIDOCAINE HYDROCHLORIDE 2 ML: 10 INJECTION, SOLUTION INFILTRATION; PERINEURAL at 13:30

## 2022-07-01 RX ADMIN — BUPIVACAINE HYDROCHLORIDE 2 ML: 2.5 INJECTION, SOLUTION INFILTRATION; PERINEURAL at 13:30

## 2022-07-01 RX ADMIN — LIDOCAINE HYDROCHLORIDE 3 ML: 10 INJECTION, SOLUTION INFILTRATION; PERINEURAL at 13:30

## 2022-07-01 RX ADMIN — TRIAMCINOLONE ACETONIDE 40 MG: 40 INJECTION, SUSPENSION INTRA-ARTICULAR; INTRAMUSCULAR at 13:30

## 2022-07-01 NOTE — LETTER
July 1, 2022     Tara Jeffers, [address]    Patient: Maurizio Caal   YOB: 1975   Date of Visit: 7/1/2022       Dear Dr Nicole Layne: Thank you for referring Maurizio Caal to me for evaluation  Below are my notes for this consultation  If you have questions, please do not hesitate to call me  I look forward to following your patient along with you  Sincerely,        [employer], DO        CC: No Recipients  [employer], DO  7/1/2022  2:03 PM  Sign when Signing Visit  Assessment/Plan:  Assessment/Plan   Diagnoses and all orders for this visit:    Lumbar facet arthropathy  -     Ambulatory Referral to Orthopedic Surgery  -     Ambulatory Referral to Physical Therapy; Future    Degenerative disc disease, lumbar  -     Ambulatory Referral to Physical Therapy; Future    Sacroiliac joint dysfunction  -     Ambulatory Referral to Physical Therapy; Future    Lumbar strain, initial encounter  -     Ambulatory Referral to Physical Therapy; Future    Abdominal weakness  -     Ambulatory Referral to Physical Therapy; Future    Weakness of both hips  -     Ambulatory Referral to Physical Therapy; Future    Hamstring tightness of both lower extremities  -     Ambulatory Referral to Physical Therapy; Future    Primary osteoarthritis of left knee  -     meloxicam (Mobic) 15 mg tablet; Take 1 tablet (15 mg total) by mouth daily  -     Large joint arthrocentesis: L knee  -     Ambulatory Referral to Physical Therapy; Future      60-year-old female with low back and left lower extremity pain many years duration  Discussed with patient physical exam, imaging studies, impression and plan  X-rays lumbar spine noted for lower lumbar degenerative changes with disc space narrowing most pronounced at L5-S1  Physical exam lumbar spine noted for limited motion with rotating to both sides and with flexion and extension    Left knee noted for mild swelling  She has tenderness at the medial joint line  She has weakness both hips with flexion and abduction  There is no groin pain with DANIELE and FADDIR maneuvers of the hips  There is medial joint pain with valgus stress of left knee  She has normal sensation both lower extremities  She has hamstring tightness both lower extremities  Straight leg raise is unremarkable bilaterally  Clinical impression is that she is symptomatic combination of degenerative changes lumbar spine and degenerative changes in the knee  I discussed regimen of anti-inflammatory, supplements, corticoid injection, and formal therapy  I administered mixture of 2 cc 1% lidocaine, 2 cc 0 25% bupivacaine, and 1 cc Kenalog to left knee without complication  She is to start taking meloxicam 15 mg once daily with food for 30 days and during that time not to take any ibuprofen or Aleve, but may take Tylenol  She is to start taking tumeric at least 1000 mg daily, tart cherry at least 1000 mg daily, and glucosamine-chondroitin 2 to 3 times a day  She may apply topical diclofenac gel 3 to 4 times a day as needed  She is to start formal therapy as soon as possible and do home exercises as directed  She will follow up as needed  Subjective:   Patient ID: Carry Kawasaki is a 51 y o  female  Chief Complaint   Patient presents with    Lower Back - Pain       23-year-old female presents for evaluation of low back and left lower extremity pain many years duration  She reports having had pain described as localized to the lumbar spine, achy and sore, radiating distally to the left lower extremity, worse with prolonged standing and ambulation, associated stiffness, and improved resting  She has also had pain of left knee described as generalized knee but worse at the medial aspect, worse with bearing weight and ambulating, associated with swelling, and improved resting  She has been managing symptoms with taking ibuprofen    Symptoms have been mild for quite some time however past few months have worsened with working a job where she spends lot of time standing  She was referred for imaging studies by primary care provider and referred to orthopedic care  Back Pain  This is a chronic problem  The current episode started more than 1 year ago  The problem occurs daily  The problem has been gradually worsening  Associated symptoms include arthralgias and joint swelling  Pertinent negatives include no abdominal pain, chest pain, chills, fever, numbness, rash, sore throat or weakness  The symptoms are aggravated by standing, twisting, walking and bending  She has tried rest and NSAIDs for the symptoms  The treatment provided mild relief  The following portions of the patient's history were reviewed and updated as appropriate: She  has a past medical history of Hypertension and Varicella  She  has a past surgical history that includes  section  Her family history includes Esophageal cancer in her mother; Heart disease in her father; No Known Problems in her brother, brother, daughter, maternal aunt, maternal grandfather, maternal grandmother, paternal aunt, paternal aunt, paternal aunt, paternal aunt, paternal aunt, paternal grandfather, paternal grandmother, sister, sister, and son  She  reports that she has never smoked  She has never used smokeless tobacco  She reports previous alcohol use  She reports that she does not use drugs  She has No Known Allergies       Review of Systems   Constitutional: Negative for chills and fever  HENT: Negative for sore throat  Eyes: Negative for visual disturbance  Respiratory: Negative for shortness of breath  Cardiovascular: Negative for chest pain  Gastrointestinal: Negative for abdominal pain  Genitourinary: Negative for flank pain  Musculoskeletal: Positive for arthralgias, back pain and joint swelling  Skin: Negative for rash and wound     Neurological: Negative for weakness and numbness  Hematological: Does not bruise/bleed easily  Psychiatric/Behavioral: Negative for self-injury  Objective:  Vitals:    07/01/22 1315   BP: 134/84   Pulse: 72   Weight: 105 kg (230 lb 9 6 oz)   Height: 5' 4" (1 626 m)     Right Ankle Exam     Muscle Strength   Dorsiflexion:  5/5  Plantar flexion:  5/5      Left Ankle Exam     Muscle Strength   Dorsiflexion:  5/5   Plantar flexion:  5/5       Left Knee Exam     Muscle Strength   The patient has normal left knee strength  Tenderness   The patient is experiencing tenderness in the medial joint line  Range of Motion   Extension: normal   Flexion: 120     Tests   Varus: negative Valgus: positive (Pain, no laxity)    Other   Swelling: mild      Right Hip Exam     Muscle Strength   Abduction: 4/5   Flexion: 4/5     Tests   DANIELE: negative    Comments:  Negative FADDIR  Hamstring tightness      Left Hip Exam     Muscle Strength   Abduction: 4/5   Flexion: 4/5     Tests   DANIELE: negative    Comments:  Negative FADDIR  Hamstring tightness      Back Exam     Range of Motion   Extension: abnormal   Flexion: abnormal   Lateral bend right: normal   Lateral bend left: normal   Rotation right: abnormal   Rotation left: abnormal     Muscle Strength   Right Quadriceps:  5/5   Left Quadriceps:  5/5     Tests   Straight leg raise right: negative  Straight leg raise left: negative    Other   Sensation: normal          Strength/Myotome Testing     Left Ankle/Foot   Dorsiflexion: 5  Plantar flexion: 5    Right Ankle/Foot   Dorsiflexion: 5  Plantar flexion: 5      Physical Exam  Vitals and nursing note reviewed  Constitutional:       General: She is not in acute distress  Appearance: She is well-developed  She is not ill-appearing or diaphoretic  HENT:      Head: Normocephalic        Right Ear: External ear normal       Left Ear: External ear normal    Eyes:      Conjunctiva/sclera: Conjunctivae normal    Neck:      Trachea: No tracheal deviation  Cardiovascular:      Rate and Rhythm: Normal rate  Pulmonary:      Effort: Pulmonary effort is normal  No respiratory distress  Abdominal:      General: There is no distension  Musculoskeletal:         General: Swelling present  No deformity or signs of injury  Lumbar back: Negative right straight leg raise test and negative left straight leg raise test    Skin:     General: Skin is warm and dry  Coloration: Skin is not jaundiced or pale  Neurological:      Mental Status: She is alert and oriented to person, place, and time  Psychiatric:         Mood and Affect: Mood normal          Behavior: Behavior normal          Thought Content: Thought content normal          Judgment: Judgment normal            I have personally reviewed pertinent films in PACS and my interpretation is Lower lumbar degenerative changes, disc space narrowing most pronounced at L5-S1  Large joint arthrocentesis: L knee  Universal Protocol:  Consent: Verbal consent obtained  Risks and benefits: risks, benefits and alternatives were discussed  Consent given by: patient  Time out: Immediately prior to procedure a "time out" was called to verify the correct patient, procedure, equipment, support staff and site/side marked as required  Patient understanding: patient states understanding of the procedure being performed  Patient consent: the patient's understanding of the procedure matches consent given  Procedure consent: procedure consent matches procedure scheduled  Relevant documents: relevant documents present and verified  Test results: test results available and properly labeled  Site marked: the operative site was marked  Radiology Images displayed and confirmed   If images not available, report reviewed: imaging studies available  Required items: required blood products, implants, devices, and special equipment available  Patient identity confirmed: verbally with patient    Supporting Documentation  Indications: pain   Procedure Details  Location: knee - L knee  Preparation: Patient was prepped and draped in the usual sterile fashion  Needle gauge: 21G 2"  Ultrasound guidance: no  Approach: anteromedial  Medications administered: 2 mL bupivacaine 0 25 %; 2 mL lidocaine 1 %; 3 mL lidocaine 1 %; 40 mg triamcinolone acetonide 40 mg/mL    Patient tolerance: patient tolerated the procedure well with no immediate complications  Dressing:  Sterile dressing applied

## 2022-07-01 NOTE — PROGRESS NOTES
Assessment/Plan:  Assessment/Plan   Diagnoses and all orders for this visit:    Lumbar facet arthropathy  -     Ambulatory Referral to Orthopedic Surgery  -     Ambulatory Referral to Physical Therapy; Future    Degenerative disc disease, lumbar  -     Ambulatory Referral to Physical Therapy; Future    Sacroiliac joint dysfunction  -     Ambulatory Referral to Physical Therapy; Future    Lumbar strain, initial encounter  -     Ambulatory Referral to Physical Therapy; Future    Abdominal weakness  -     Ambulatory Referral to Physical Therapy; Future    Weakness of both hips  -     Ambulatory Referral to Physical Therapy; Future    Hamstring tightness of both lower extremities  -     Ambulatory Referral to Physical Therapy; Future    Primary osteoarthritis of left knee  -     meloxicam (Mobic) 15 mg tablet; Take 1 tablet (15 mg total) by mouth daily  -     Large joint arthrocentesis: L knee  -     Ambulatory Referral to Physical Therapy; Future      50-year-old female with low back and left lower extremity pain many years duration  Discussed with patient physical exam, imaging studies, impression and plan  X-rays lumbar spine noted for lower lumbar degenerative changes with disc space narrowing most pronounced at L5-S1  Physical exam lumbar spine noted for limited motion with rotating to both sides and with flexion and extension  Left knee noted for mild swelling  She has tenderness at the medial joint line  She has weakness both hips with flexion and abduction  There is no groin pain with DANIELE and FADDIR maneuvers of the hips  There is medial joint pain with valgus stress of left knee  She has normal sensation both lower extremities  She has hamstring tightness both lower extremities  Straight leg raise is unremarkable bilaterally  Clinical impression is that she is symptomatic combination of degenerative changes lumbar spine and degenerative changes in the knee    I discussed regimen of anti-inflammatory, supplements, corticoid injection, and formal therapy  I administered mixture of 2 cc 1% lidocaine, 2 cc 0 25% bupivacaine, and 1 cc Kenalog to left knee without complication  She is to start taking meloxicam 15 mg once daily with food for 30 days and during that time not to take any ibuprofen or Aleve, but may take Tylenol  She is to start taking tumeric at least 1000 mg daily, tart cherry at least 1000 mg daily, and glucosamine-chondroitin 2 to 3 times a day  She may apply topical diclofenac gel 3 to 4 times a day as needed  She is to start formal therapy as soon as possible and do home exercises as directed  She will follow up as needed  Subjective:   Patient ID: Oseas Gant is a 51 y o  female  Chief Complaint   Patient presents with    Lower Back - Pain       41-year-old female presents for evaluation of low back and left lower extremity pain many years duration  She reports having had pain described as localized to the lumbar spine, achy and sore, radiating distally to the left lower extremity, worse with prolonged standing and ambulation, associated stiffness, and improved resting  She has also had pain of left knee described as generalized knee but worse at the medial aspect, worse with bearing weight and ambulating, associated with swelling, and improved resting  She has been managing symptoms with taking ibuprofen  Symptoms have been mild for quite some time however past few months have worsened with working a job where she spends lot of time standing  She was referred for imaging studies by primary care provider and referred to orthopedic care  Back Pain  This is a chronic problem  The current episode started more than 1 year ago  The problem occurs daily  The problem has been gradually worsening  Associated symptoms include arthralgias and joint swelling   Pertinent negatives include no abdominal pain, chest pain, chills, fever, numbness, rash, sore throat or weakness  The symptoms are aggravated by standing, twisting, walking and bending  She has tried rest and NSAIDs for the symptoms  The treatment provided mild relief  The following portions of the patient's history were reviewed and updated as appropriate: She  has a past medical history of Hypertension and Varicella  She  has a past surgical history that includes  section  Her family history includes Esophageal cancer in her mother; Heart disease in her father; No Known Problems in her brother, brother, daughter, maternal aunt, maternal grandfather, maternal grandmother, paternal aunt, paternal aunt, paternal aunt, paternal aunt, paternal aunt, paternal grandfather, paternal grandmother, sister, sister, and son  She  reports that she has never smoked  She has never used smokeless tobacco  She reports previous alcohol use  She reports that she does not use drugs  She has No Known Allergies       Review of Systems   Constitutional: Negative for chills and fever  HENT: Negative for sore throat  Eyes: Negative for visual disturbance  Respiratory: Negative for shortness of breath  Cardiovascular: Negative for chest pain  Gastrointestinal: Negative for abdominal pain  Genitourinary: Negative for flank pain  Musculoskeletal: Positive for arthralgias, back pain and joint swelling  Skin: Negative for rash and wound  Neurological: Negative for weakness and numbness  Hematological: Does not bruise/bleed easily  Psychiatric/Behavioral: Negative for self-injury  Objective:  Vitals:    22 1315   BP: 134/84   Pulse: 72   Weight: 105 kg (230 lb 9 6 oz)   Height: 5' 4" (1 626 m)     Right Ankle Exam     Muscle Strength   Dorsiflexion:  5/5  Plantar flexion:  5/5      Left Ankle Exam     Muscle Strength   Dorsiflexion:  5/5   Plantar flexion:  5/5       Left Knee Exam     Muscle Strength   The patient has normal left knee strength      Tenderness   The patient is experiencing tenderness in the medial joint line  Range of Motion   Extension: normal   Flexion: 120     Tests   Varus: negative Valgus: positive (Pain, no laxity)    Other   Swelling: mild      Right Hip Exam     Muscle Strength   Abduction: 4/5   Flexion: 4/5     Tests   DANIELE: negative    Comments:  Negative FADDIR  Hamstring tightness      Left Hip Exam     Muscle Strength   Abduction: 4/5   Flexion: 4/5     Tests   DANIELE: negative    Comments:  Negative FADDIR  Hamstring tightness      Back Exam     Range of Motion   Extension: abnormal   Flexion: abnormal   Lateral bend right: normal   Lateral bend left: normal   Rotation right: abnormal   Rotation left: abnormal     Muscle Strength   Right Quadriceps:  5/5   Left Quadriceps:  5/5     Tests   Straight leg raise right: negative  Straight leg raise left: negative    Other   Sensation: normal          Strength/Myotome Testing     Left Ankle/Foot   Dorsiflexion: 5  Plantar flexion: 5    Right Ankle/Foot   Dorsiflexion: 5  Plantar flexion: 5      Physical Exam  Vitals and nursing note reviewed  Constitutional:       General: She is not in acute distress  Appearance: She is well-developed  She is not ill-appearing or diaphoretic  HENT:      Head: Normocephalic  Right Ear: External ear normal       Left Ear: External ear normal    Eyes:      Conjunctiva/sclera: Conjunctivae normal    Neck:      Trachea: No tracheal deviation  Cardiovascular:      Rate and Rhythm: Normal rate  Pulmonary:      Effort: Pulmonary effort is normal  No respiratory distress  Abdominal:      General: There is no distension  Musculoskeletal:         General: Swelling present  No deformity or signs of injury  Lumbar back: Negative right straight leg raise test and negative left straight leg raise test    Skin:     General: Skin is warm and dry  Coloration: Skin is not jaundiced or pale     Neurological:      Mental Status: She is alert and oriented to person, place, and time    Psychiatric:         Mood and Affect: Mood normal          Behavior: Behavior normal          Thought Content: Thought content normal          Judgment: Judgment normal            I have personally reviewed pertinent films in PACS and my interpretation is Lower lumbar degenerative changes, disc space narrowing most pronounced at L5-S1  Large joint arthrocentesis: L knee  Universal Protocol:  Consent: Verbal consent obtained  Risks and benefits: risks, benefits and alternatives were discussed  Consent given by: patient  Time out: Immediately prior to procedure a "time out" was called to verify the correct patient, procedure, equipment, support staff and site/side marked as required  Patient understanding: patient states understanding of the procedure being performed  Patient consent: the patient's understanding of the procedure matches consent given  Procedure consent: procedure consent matches procedure scheduled  Relevant documents: relevant documents present and verified  Test results: test results available and properly labeled  Site marked: the operative site was marked  Radiology Images displayed and confirmed   If images not available, report reviewed: imaging studies available  Required items: required blood products, implants, devices, and special equipment available  Patient identity confirmed: verbally with patient    Supporting Documentation  Indications: pain   Procedure Details  Location: knee - L knee  Preparation: Patient was prepped and draped in the usual sterile fashion  Needle gauge: 21G 2"  Ultrasound guidance: no  Approach: anteromedial  Medications administered: 2 mL bupivacaine 0 25 %; 2 mL lidocaine 1 %; 3 mL lidocaine 1 %; 40 mg triamcinolone acetonide 40 mg/mL    Patient tolerance: patient tolerated the procedure well with no immediate complications  Dressing:  Sterile dressing applied

## 2022-07-01 NOTE — TELEPHONE ENCOUNTER
Scheduled date of colonoscopy (as of today): 08/08/2022  Physician performing colonoscopy:  Dr Parminder Morrissey  Location of colonoscopy: [place]  Bowel prep reviewed with patient: Miralax/Duclolax   Instructions reviewed with patient by: Prep instructions sent by mail     Clearances: N/A

## 2022-07-10 DIAGNOSIS — F41.9 ANXIETY AND DEPRESSION: ICD-10-CM

## 2022-07-10 DIAGNOSIS — F32.A ANXIETY AND DEPRESSION: ICD-10-CM

## 2022-07-19 ENCOUNTER — CONSULT (OUTPATIENT)
Dept: DERMATOLOGY | Facility: CLINIC | Age: 47
End: 2022-07-19
Payer: COMMERCIAL

## 2022-07-19 VITALS — WEIGHT: 234.6 LBS | TEMPERATURE: 97.5 F | BODY MASS INDEX: 40.05 KG/M2 | HEIGHT: 64 IN

## 2022-07-19 DIAGNOSIS — K64.4 ANORECTAL SKIN TAGS: ICD-10-CM

## 2022-07-19 DIAGNOSIS — D48.5 NEOPLASM OF UNCERTAIN BEHAVIOR OF SKIN: Primary | ICD-10-CM

## 2022-07-19 PROCEDURE — 11102 TANGNTL BX SKIN SINGLE LES: CPT | Performed by: DERMATOLOGY

## 2022-07-19 PROCEDURE — 99244 OFF/OP CNSLTJ NEW/EST MOD 40: CPT | Performed by: DERMATOLOGY

## 2022-07-19 PROCEDURE — 88305 TISSUE EXAM BY PATHOLOGIST: CPT | Performed by: STUDENT IN AN ORGANIZED HEALTH CARE EDUCATION/TRAINING PROGRAM

## 2022-07-19 NOTE — PATIENT INSTRUCTIONS
SKIN SHAVE BIOPSY  Rationale for Procedure  A skin shave biopsy allows the dermatologist to further examine a lesion or rash under the microscope to potentially obtain a more specific diagnosis  It usually involves numbing the area with numbing medication and removing a small piece of skin  Usually, the area will be closed with sutures at the end of the procedure  Sutures are not usually needed  Description of Procedure  We would like to perform a skin shave biopsy today  A local anesthetic, similar to the kind that a dentist uses when filling a cavity, will be injected with a very small needle into the skin area to be sampled  The injected skin and tissue underneath should go to sleep and become numbed so that no further pain should be felt  An instrument shaped like a tiny "razor blade" (i e , the shave biopsy instrument) will be used to cut a small round piece of skin and tissue from the area so that the sample may be taken and examined more closely under the microscope  A slight amount of bleeding will occur, but it is usually stopped with direct pressure, chemical cautery, and/or a pressure bandage; rarely, electrocautery and other means of intervention may be necessary to help stop the bleeding  Sutures are not usually needed  Surgical Vaseline-type ointment will also applied after the procedure to help create a barrier between the wound and the outside world      Risks and Potential Complications  While the advantage of a skin shave biopsy is that it allows us to potentially examine the skin more closely under the microscope, there are some risks and potential complications that include but are not limited to the following:  Bleeding  Infection  Pain  Scar/keloid  Skin discoloration  Incomplete removal of the lesion or rash being sampled (in other words, this procedure is intended as a sampling and is not considered a definitive treatment)  Recurrence of the lesion or rash being sampled  Nerve Damage/Numbness/Loss of Function  Allergic Reaction to Anesthesia  Biopsies are diagnostic procedures and, based on findings, additional treatment or evaluation may be required (in other words, this procedure is intended as a sampling and is not considered a definitive treatment)  Loss or destruction of the sample specimen could result in no additional findings  The person at the microscope may not be able to provide additional information other than what we already know or suspect  What You Will Need to Do After the Procedure  Keep the area clean and dry  Try NOT to remove the bandage for the first 24 hours  Gently clean the area and apply Vaseline ointment (this is over the counter and not a prescription) to the biopsy site for up to 2 weeks  Generally, sutures are not needed  If any sutures were placed, return for suture removal as instructed (generally 1 week for the face, 2 weeks for the body)  Take Acetaminophen (Tylenol) for discomfort, if no contraindications  Do NOT take Ibuprofen or aspirin unless specifically told to do so by your Dermatologist because these medications can make bleeding worse  Call our office immediately for signs of infection: fever, chills, increased redness, warmth, tenderness, discomfort/pain, or pus or foul smell coming from the wound  If a small amount of bleeding is noticed, place a clean cloth over the area and apply firm pressure for ten minutes  Check the wound ONLY after 10 minutes of direct pressure; do not cheat and sneak a peak, as that does not count  If bleeding persists after 10 minutes of legitimate direct pressure, then try one more round of direct pressure for an additional 10 minutes to the area  Should the bleeding become heavier or not stop after the second attempt, call [facility] directly at [phone] (SKIN) or, if after hours, go to your local Emergency Room/Emergency Department

## 2022-07-19 NOTE — PROGRESS NOTES
[facility] Note     Patient Name: Tayo Abraham  Encounter Date: 7/19/2022     Have you been cared for by a [facility] in the last 3 years and, if so, which one? No    · Have you traveled outside of the [address] [place] [address] in the past 3 months or outside of the [facility] area in the last 2 weeks? No     May we call your Preferred Phone number to discuss your specific medical information? Yes     May we leave a detailed message that includes your specific medical information? Yes      Today's Chief Concerns:   Concern #1:  Skin tags    Past Medical History:  Have you personally ever had or currently have any of the following? · Skin cancer (such as Melanoma, Basal Cell Carcinoma, Squamous Cell Carcinoma? (If Yes, please provide more detail)- No  · Eczema: No  · Psoriasis: No  · HIV/AIDS: No  · Hepatitis B or C: No  · Tuberculosis: No  · Systemic Immunosuppression such as Diabetes, Biologic or Immunotherapy, Chemotherapy, Organ Transplantation, Bone Marrow Transplantation (If YES, please provide more detail): No  · Radiation Treatment (If YES, please provide more detail): No  · Any other major medical conditions/concerns? 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 Swimming     Family History:  Have any of your "first degree relatives" (parent, brother, sister, or child) had any of the following       · Skin cancer such as Melanoma or Merkel Cell Carcinoma or Pancreatic Cancer? No  · Eczema, Asthma, Hay Fever or Seasonal Allergies: No  · Psoriasis or Psoriatic Arthritis: No  · Do any other medical conditions seem to run in your family? If Yes, what condition and which relatives?   No    Current Medications:     Current Outpatient Medications:     Diclofenac Sodium (VOLTAREN) 1 %, Apply 2 g topically in the morning and 2 g at noon and 2 g in the evening and 2 g before bedtime  , Disp: 150 g, Rfl: 3    ibuprofen (MOTRIN) 800 mg tablet, Take 800 mg by mouth every 6 to 8 hours if needed for pain, Disp: , Rfl:     Iron-Vitamin C (Vitron-C)  MG TABS, Take 1 tablet by mouth 2 (two) times a day, Disp: 60 tablet, Rfl: 3    levothyroxine (Euthyrox) 50 mcg tablet, Take 1 tablet (50 mcg total) by mouth daily in the early morning, Disp: 90 tablet, Rfl: 3    meloxicam (Mobic) 15 mg tablet, Take 1 tablet (15 mg total) by mouth daily, Disp: 30 tablet, Rfl: 1    methocarbamol (ROBAXIN) 500 mg tablet, Take 1 tablet (500 mg total) by mouth in the morning and 1 tablet (500 mg total) at noon and 1 tablet (500 mg total) in the evening and 1 tablet (500 mg total) before bedtime  , Disp: 30 tablet, Rfl: 0    pantoprazole (PROTONIX) 20 mg tablet, Take 1 tablet (20 mg total) by mouth daily before breakfast, Disp: 30 tablet, Rfl: 5    sertraline (ZOLOFT) 50 mg tablet, take 1 tablet by mouth once daily, Disp: 30 tablet, Rfl: 5      Review of Systems:  Have you recently had or currently have any of the following? If YES, what are you doing for the problem? · Fever, chills or unintended weight loss: No  · Sudden loss or change in your vision: No  · Nausea, vomiting or blood in your stool: No  · Painful or swollen joints: No  · Wheezing or cough: No  · Changing mole or non-healing wound: No  · Nosebleeds: No  · Excessive sweating: No  · Easy or prolonged bleeding? No  · Over the last 2 weeks, how often have you been bothered by the following problems? · Taking little interest or pleasure in doing things: 1 - Not at All  · Feeling down, depressed, or hopeless: 1 - Not at All  · Rapid heartbeat with epinephrine:  No    · FEMALES ONLY:    · Are you pregnant or planning to become pregnant? No  · Are you currently or planning to be nursing or breast feeding? No    · Any known allergies?       No Known Allergies      Physical Exam:     Was a chaperone (Derm Clinical Assistant) present throughout the entire Physical Exam? Yes     Did the Dermatology Team specifically  the patient on the importance of a Full Skin Exam to be sure that nothing is missed clinically? Yes}  o Did the patient ultimately request or accept a Full Skin Exam?  NO  o Did the patient specifically refuse to have the areas "under-the-bra" examined by the Dermatologist? Tavo ceja Did the patient specifically refuse to have the areas "under-the-underwear" examined by the Dermatologist? No    CONSTITUTIONAL:   Vitals:    07/19/22 1544   Temp: 97 5 °F (36 4 °C)   TempSrc: Temporal   Weight: 106 kg (234 lb 9 6 oz)   Height: 5' 4" (1 626 m)     PSYCH: Normal mood and affect  EYES: Normal conjunctiva  ENT: Normal lips and oral mucosa  CARDIOVASCULAR: No edema  RESPIRATORY: Normal respirations  HEME/LYMPH/IMMUNO:  No regional lymphadenopathy except as noted below in "ASSESSMENT AND PLAN BY DIAGNOSIS"    SKIN:  FULL ORGAN SYSTEM EXAM   Face Normal except as noted below in Assessment   Neck Normal except as noted below in Assessment   Axillae Viewed areas Normal except as noted below in Assessment   Buttocks Normal except as noted below in Assessment      1  ACROCHORDON ("SKIN TAG")    Physical Exam:   Anatomic Location Affected:  Bilateral axilla, face   Morphological Description:  Skin colored and brown pedunculated papules   Pertinent Positives:   Pertinent Negatives: Additional History of Present Condition:  Patient complains of multiple skin tags  Denies prior treatment  The one on her buttocks is the most irritating       Assessment and Plan:  Based on a thorough discussion of this condition and the management approach to it (including a comprehensive discussion of the known risks, side effects and potential benefits of treatment), the patient (family) agrees to implement the following specific plan:   Reassured, benign   Treatment considered cosmetic, may leave white marks after treatment  Cosmetic pricing: $150 to treat up to 10 lesions, and if over 10 lesions, then additional $10/lesion thereafter  Skin tags are common, soft, harmless skin lesions that are also called, in the appropriate settings, papillomas, fibroepithelial polyps, and soft fibromas  They are made up of loosely arranged collagen fibers and blood vessels surrounded by a thickened or thinned-out epidermis  Skin tags tend to develop in both men and women as we grow older  They are usually found on the skin folds (neck, armpits, groin)  It is not known what specifically causes skin tags  Certain factors, though, do appear to play a role:   Chaffing and irritation from skin rubbing together   High levels of growth factors (as seen, for example, in pregnancy or in acromegaly/gigantism)   Insulin resistance   Human papillomavirus (wart virus)    We discussed that most skin tags do not need to be treated unless they are specifically causing the patient physical distress or limitation or pose a risk for a larger problem such as an infection that forms secondary to excoriation or chronic irritation  We had a thorough discussion of treatment options and specific risks (including that any procedural treatment may not be covered by insurance and would then be the patient's responsibility) and benefits/alternatives including but not limited to the following:   Cryotherapy (freezing)   Shave removal   Surgical excision (snip excision with scissors)   Electrosurgery   Ligation (we do not do this procedure and counseled against it due to risk of tissue necrosis and infection)    2  NEOPLASM OF UNCERTAIN BEHAVIOR OF SKIN    Physical Exam:   (Anatomic Location); (Size and Morphological Description); (Differential Diagnosis):  o Left buttock; 2 cm soft nodule; benign polyp    Pertinent Positives:   Pertinent Negatives: Additional History of Present Condition:  Patient states there is a spot on her buttocks   It started small and now has gotten larger  It is irritating due to the location  Assessment and Plan:   I have discussed with the patient that a sample of skin via a "skin biopsy would be potentially helpful to further make a specific diagnosis under the microscope   Based on a thorough discussion of this condition and the management approach to it (including a comprehensive discussion of the known risks, side effects and potential benefits of treatment), the patient (family) agrees to implement the following specific plan:    o Procedure:  Skin Biopsy  After a thorough discussion of treatment options and risk/benefits/alternatives (including but not limited to local pain, scarring, dyspigmentation, blistering, possible superinfection, and inability to confirm a diagnosis via histopathology), verbal and written consent were obtained and portion of the rash was biopsied for tissue sample  See below for consent that was obtained from patient and subsequent Procedure Note  PROCEDURE TANGENTIAL (SHAVE) BIOPSY NOTE:     Performing Physician: Rosalba Rolle    Anatomic Location; Clinical Description with size (cm); Pre-Op Diagnosis:   o Left buttock; 2 cm soft nodule; benign polyp    Post-op diagnosis: Same      Local anesthesia: 1% xylocaine with epi       Topical anesthesia: None     Hemostasis: Aluminum chloride       After obtaining informed consent  at which time there was a discussion about the purpose of biopsy  and low risks of infection and bleeding  The area was prepped and draped in the usual fashion  Anesthesia was obtained with 1% lidocaine with epinephrine  A shave biopsy to an appropriate sampling depth was obtained by Shave (Dermablade or 15 blade) The resulting wound was covered with surgical ointment and bandaged appropriately  The patient tolerated the procedure well without complications and was without signs of functional compromise  Specimen has been sent for review by Dermatopathology      Standard post-procedure care has been explained and has been included in written form within the patient's copy of Informed Consent  INFORMED CONSENT DISCUSSION AND POST-OPERATIVE INSTRUCTIONS FOR PATIENT    I   RATIONALE FOR PROCEDURE  I understand that a skin biopsy allows the Dermatologist to test a lesion or rash under the microscope to obtain a diagnosis  It usually involves numbing the area with numbing medication and removing a small piece of skin; sometimes the area will be closed with sutures  In this specific procedure, sutures are not usually needed  If any sutures are placed, then they are usually need to be removed in 2 weeks or less  I understand that my Dermatologist recommends that a skin "shave" biopsy be performed today  A local anesthetic, similar to the kind that a dentist uses when filling a cavity, will be injected with a very small needle into the skin area to be sampled  The injected skin and tissue underneath "will go to sleep and become numb so no pain should be felt afterwards  An instrument shaped like a tiny "razor blade" (shave biopsy instrument) will be used to cut a small piece of tissue and skin from the area so that a sample of tissue can be taken and examined more closely under the microscope  A slight amount of bleeding will occur, but it will be stopped with direct pressure and a pressure bandage and any other appropriate methods  I understands that a scar will form where the wound was created  Surgical ointment will be applied to help protect the wound  Sutures are not usually needed      II   RISKS AND POTENTIAL COMPLICATIONS   I understand the risks and potential complications of a skin biopsy include but are not limited to the following:   Bleeding   Infection   Pain   Scar/keloid   Skin discoloration   Incomplete Removal   Recurrence   Nerve Damage/Numbness/Loss of Function   Allergic Reaction to Anesthesia   Biopsies are diagnostic procedures and based on findings additional treatment or evaluation may be required   Loss or destruction of specimen resulting in no additional findings    My Dermatologist has explained to me the nature of the condition, the nature of the procedure, and the benefits to be reasonably expected compared with alternative approaches  My Dermatologist has discussed the likelihood of major risks or complications of this procedure including the specific risks listed above, such as bleeding, infection, and scarring/keloid  I understand that a scar is expected after this procedure  I understand that my physician cannot predict if the scar will form a "keloid," which extends beyond the borders of the wound that is created  A keloid is a thick, painful, and bumpy scar  A keloid can be difficult to treat, as it does not always respond well to therapy, which includes injecting cortisone directly into the keloid every few weeks  While this usually reduces the pain and size of the scar, it does not eliminate it  I understand that photographs may be taken before and after the procedure  These will be maintained as part of the medical providers confidential records and may not be made available to me  I further authorize the medical provider to use the photographs for teaching purposes or to illustrate scientific papers, books, or lectures if in his/her judgment, medical research, education, or science may benefit from its use  I have had an opportunity to fully inquire about the risks and benefits of this procedure and its alternatives  I have been given ample time and opportunity to ask questions and to seek a second opinion if I wished to do so  I acknowledge that there have specifically been no guarantees as to the cosmetic results from the procedure  I am aware that with any procedure there is always the possibility of an unexpected complication  III   POST-PROCEDURAL CARE (WHAT YOU WILL NEED TO DO "AFTER THE BIOPSY" TO OPTIMIZE HEALING)     Keep the area clean and dry  Try NOT to remove the bandage or get it wet for the first 24 hours   Gently clean the area and apply surgical ointment (such as Vaseline petrolatum ointment, which is available "over the counter" and not a prescription) to the biopsy site for up to 2 weeks straight  This acts to protect the wound from the outside world   Sutures are not usually placed in this procedure  If any sutures were placed, return for suture removal as instructed (generally 1 week for the face, 2 weeks for the body)   Take Acetaminophen (Tylenol) for discomfort, if no contraindications  Ibuprofen or aspirin could make bleeding worse   Call our office immediately for signs of infection: fever, chills, increased redness, warmth, tenderness, discomfort/pain, or pus or foul smell coming from the wound  WHAT TO DO IF THERE IS ANY BLEEDING? If a small amount of bleeding is noticed, place a clean cloth over the area and apply firm pressure for ten minutes  Check the wound after 10 minutes of direct pressure  If bleeding persists, try one more time for an additional 10 minutes of direct pressure on the area  If the bleeding becomes heavier or does not stop after the second attempt, or if you have any other questions about this procedure, then please call your [address] [place] [address]'s Dermatologist by calling [phone] (SKIN)  I hereby acknowledge that I have reviewed and verified the site with my Dermatologist and have requested and authorized my Dermatologist to proceed with the procedure        Scribe Attestation    I,:  Honorio Milan am acting as a scribe while in the presence of the attending physician :       I,:  Linda Bazzi MD personally performed the services described in this documentation    as scribed in my presence :

## 2022-07-21 ENCOUNTER — EVALUATION (OUTPATIENT)
Dept: PHYSICAL THERAPY | Facility: CLINIC | Age: 47
End: 2022-07-21
Payer: COMMERCIAL

## 2022-07-21 DIAGNOSIS — M62.9 HAMSTRING TIGHTNESS OF BOTH LOWER EXTREMITIES: ICD-10-CM

## 2022-07-21 DIAGNOSIS — R29.898 WEAKNESS OF BOTH HIPS: ICD-10-CM

## 2022-07-21 DIAGNOSIS — M47.816 LUMBAR FACET ARTHROPATHY: Primary | ICD-10-CM

## 2022-07-21 DIAGNOSIS — M51.36 DEGENERATIVE DISC DISEASE, LUMBAR: ICD-10-CM

## 2022-07-21 DIAGNOSIS — S39.012D LUMBAR SPINE STRAIN, SUBSEQUENT ENCOUNTER: ICD-10-CM

## 2022-07-21 DIAGNOSIS — M17.12 PRIMARY OSTEOARTHRITIS OF LEFT KNEE: ICD-10-CM

## 2022-07-21 DIAGNOSIS — M53.3 SACROILIAC JOINT DYSFUNCTION: ICD-10-CM

## 2022-07-21 DIAGNOSIS — R19.8 ABDOMINAL WEAKNESS: ICD-10-CM

## 2022-07-21 PROCEDURE — 97110 THERAPEUTIC EXERCISES: CPT | Performed by: PHYSICAL THERAPIST

## 2022-07-21 PROCEDURE — 97161 PT EVAL LOW COMPLEX 20 MIN: CPT | Performed by: PHYSICAL THERAPIST

## 2022-07-21 PROCEDURE — 97112 NEUROMUSCULAR REEDUCATION: CPT | Performed by: PHYSICAL THERAPIST

## 2022-07-21 NOTE — PROGRESS NOTES
PT Evaluation     Today's date: 2022  Patient name: Tayo Abraham  : 1975  MRN: [id number]  Referring provider: Rosetta Villa DO  Dx:   Encounter Diagnosis     ICD-10-CM    1  Lumbar facet arthropathy  M47 816 Ambulatory Referral to Physical Therapy   2  Degenerative disc disease, lumbar  M51 36 Ambulatory Referral to Physical Therapy   3  Sacroiliac joint dysfunction  M53 3 Ambulatory Referral to Physical Therapy   4  Lumbar spine strain, subsequent encounter  S39 012D Ambulatory Referral to Physical Therapy   5  Abdominal weakness  R19 8 Ambulatory Referral to Physical Therapy   6  Weakness of both hips  R29 898 Ambulatory Referral to Physical Therapy   7  Hamstring tightness of both lower extremities  M62 9 Ambulatory Referral to Physical Therapy   8  Primary osteoarthritis of left knee  M17 12 Ambulatory Referral to Physical Therapy       Start Time: 1545  Stop Time: 1630  Total time in clinic (min): 45 minutes    Assessment  Assessment details: Upon examination, pt presents with impairments of decreased strength, decreased ROM, and increased pain of pt's lumbar spine and B hips resulting in limitations of self-care/ADLs, household/work activities, and lifting objects  Pt plan of care will focus on improving strength and ROM of pt's lumbar spine and B hips as well as decreasing pain and improving tolerance to functional activities  Pt would benefit from skilled physical therapy to facilitate a return to her prior level of function  Impairments: abnormal or restricted ROM, activity intolerance, impaired physical strength, lacks appropriate home exercise program, pain with function, poor posture  and poor body mechanics  Functional limitations: self-care/ADLs, household/work activities, and lifting objectsUnderstanding of Dx/Px/POC: good   Prognosis: good    Goals  STG - 4 weeks  1   Pt will demonstrate WNL AROM of her lumbar spine in all planes to facilitate a return to her prior level of function  2  Pt will have a decrease in pain levels by 2 or better during performance of her functional activities  LTG - 8 weeks  1  Pt will demonstrate 4/5 gross strength or better of her lumbar spine and B hips in all planes to facilitate a return to her prior level of function  2  Pt's FOTO score will improve from 44 to 49 or better to facilitate a return to pt's prior level of function  Plan  Patient would benefit from: PT eval and skilled physical therapy  Planned modality interventions: cryotherapy, thermotherapy: hydrocollator packs and unattended electrical stimulation  Planned therapy interventions: abdominal trunk stabilization, activity modification, ADL training, body mechanics training, flexibility, functional ROM exercises, graded exercise, home exercise program, gait training, joint mobilization, manual therapy, massage, Ford taping, neuromuscular re-education, patient education, self care, strengthening, stretching, postural training, therapeutic activities and therapeutic exercise  Frequency: 1x week  Duration in weeks: 8  Plan of Care beginning date: 7/21/2022  Plan of Care expiration date: 9/15/2022  Treatment plan discussed with: patient        Subjective Evaluation    History of Present Illness  Date of onset: 1/24/2022  Mechanism of injury: Pt is 52 y/o female presenting to physical therapy with B lumbar pain c L radicular symptoms  Pt reports she started a new job at the end of January which required prolonged standing  Pt reports she started experiencing low back in the first week of her new job and within the month her LLE symptoms started  Pt reports she went to her primary care who gave her muscle relaxers and a referral to the orthopedist  Pt reports going to see the orthopedist where she received a prescription for meloxicam and physical therapy  Pt reports she experiences pain with standing, bending, and lifting heavy objects   Pt reports she has been experiencing tightness in her low back as well  Pain  Current pain rating: 3  At best pain rating: 3  At worst pain rating: 10  Location: lumbar  Quality: sharp  Relieving factors: medications  Aggravating factors: standing and lifting    Treatments  Previous treatment: medication  Current treatment: medication and physical therapy  Patient Goals  Patient goals for therapy: decreased pain  Patient goal: "learn exercises to get the most relief"        Objective     Postural Observations  Seated posture: fair  Standing posture: fair  Correction of posture: has no consistent effect        Palpation   Left   Tenderness of the erector spinae and lumbar paraspinals  Right   Tenderness of the erector spinae and lumbar paraspinals  Tenderness     Lumbar Spine  Tenderness in the spinous process  Additional Tenderness Details  Significant tenderness to palpation of pt's B lumbar spine    Active Range of Motion     Lumbar   Flexion: 40 degrees  with pain Restriction level: moderate  Extension: 5 degrees  with pain Restriction level: maximal  Left lateral flexion: 15 degrees    with pain Restriction level: moderate  Right lateral flexion: 15 degrees  with pain Restriction level: moderate    Strength/Myotome Testing     Left Hip   Planes of Motion   Flexion: 3+  Extension: 3+  Abduction: 3+  Adduction: 4-    Right Hip   Planes of Motion   Flexion: 3+  Extension: 3+  Abduction: 3+  Adduction: 4-    Left Knee   Flexion: 4-  Extension: 4-    Right Knee   Flexion: 4-  Extension: 4-    Tests     Lumbar     Left   Positive passive SLR  Right   Positive passive SLR       General Comments:      Lumbar Comments  Lumbar flexion gross strength: 4-/5  Lumbar extension gross strength: 3+/5  Lumbar R lateral flexion gross strength: 4-/5  Lumbar L lateral flexion gross strength: 4-/5    Significant tightness of pt's lumbar paraspinals, B hamstring, and B piriformis             Precautions: N/A      Manuals 7/21 Neuro Re-Ed             Education on POC, diagnosis, and HEP 8'                                                                                          Ther Ex             LTR 2x10            SKTC 1x5 10"            physioball rollout 1x10            Seated hamstring stretch 1x5 10"                                                                Ther Activity                                       Gait Training                                       Modalities

## 2022-07-25 ENCOUNTER — TRANSCRIBE ORDERS (OUTPATIENT)
Dept: GASTROENTEROLOGY | Facility: CLINIC | Age: 47
End: 2022-07-25

## 2022-07-28 ENCOUNTER — APPOINTMENT (OUTPATIENT)
Dept: PHYSICAL THERAPY | Facility: CLINIC | Age: 47
End: 2022-07-28
Payer: COMMERCIAL

## 2022-08-05 ENCOUNTER — OFFICE VISIT (OUTPATIENT)
Dept: PHYSICAL THERAPY | Facility: CLINIC | Age: 47
End: 2022-08-05
Payer: COMMERCIAL

## 2022-08-05 DIAGNOSIS — R29.898 WEAKNESS OF BOTH HIPS: ICD-10-CM

## 2022-08-05 DIAGNOSIS — M62.9 HAMSTRING TIGHTNESS OF BOTH LOWER EXTREMITIES: ICD-10-CM

## 2022-08-05 DIAGNOSIS — M17.12 PRIMARY OSTEOARTHRITIS OF LEFT KNEE: ICD-10-CM

## 2022-08-05 DIAGNOSIS — M47.816 LUMBAR FACET ARTHROPATHY: Primary | ICD-10-CM

## 2022-08-05 DIAGNOSIS — S39.012D LUMBAR SPINE STRAIN, SUBSEQUENT ENCOUNTER: ICD-10-CM

## 2022-08-05 DIAGNOSIS — R19.8 ABDOMINAL WEAKNESS: ICD-10-CM

## 2022-08-05 DIAGNOSIS — M53.3 SACROILIAC JOINT DYSFUNCTION: ICD-10-CM

## 2022-08-05 DIAGNOSIS — M51.36 DEGENERATIVE DISC DISEASE, LUMBAR: ICD-10-CM

## 2022-08-05 PROCEDURE — 97140 MANUAL THERAPY 1/> REGIONS: CPT | Performed by: PHYSICAL THERAPIST

## 2022-08-05 PROCEDURE — 97110 THERAPEUTIC EXERCISES: CPT | Performed by: PHYSICAL THERAPIST

## 2022-08-05 PROCEDURE — 97112 NEUROMUSCULAR REEDUCATION: CPT | Performed by: PHYSICAL THERAPIST

## 2022-08-05 NOTE — PROGRESS NOTES
Daily Note     Today's date: 2022  Patient name: Arland Goldmann  : 1975  MRN: [id number]  Referring provider: Brett Addison DO  Dx:   Encounter Diagnosis     ICD-10-CM    1  Lumbar facet arthropathy  M47 816    2  Degenerative disc disease, lumbar  M51 36    3  Sacroiliac joint dysfunction  M53 3    4  Lumbar spine strain, subsequent encounter  S39 012D    5  Abdominal weakness  R19 8    6  Weakness of both hips  R29 898    7  Hamstring tightness of both lower extremities  M62 9    8  Primary osteoarthritis of left knee  M17 12        Start Time: 845  Stop Time: 930  Total time in clinic (min): 45 minutes    Subjective: Pt reports she has continued to perform her home exercises to success for her low back, however, her R knee has started to bother her  Objective: See treatment diary below      Assessment: Tolerated treatment well  Patient demonstrated fatigue post treatment, exhibited good technique with therapeutic exercises and would benefit from continued PT  Pt was able to progress today with inclusion of core and hip strengthening exercises  Pt demonstrated improved lumbar AROM after performance of mobility exercises  Pt required min verbal cues to facilitate performance of proper technique  Assess pt response to treatment at next visit  Plan: Continue per plan of care        Precautions: N/A      Manuals  8/5           Piriformis stretch  1x5 20" ea           Hamstring stretch  1x5 20" ea                                     Neuro Re-Ed             Pt education 8' 5'           PPT  1x10 5"           bridges  2x10 3"           Prone hip ext  1x10 3" ea                                                  Ther Ex             LTR 2x10 2x10           SKTC 1x5 10" 1x5 10"           3-way physioball rollout 1x10 1x10 3"           Seated hamstring stretch 1x5 10"            Prone press up  1x10           Standing lumbar ext  1x10 3"                                     Ther Activity Gait Training                                       Modalities

## 2022-08-07 RX ORDER — SODIUM CHLORIDE, SODIUM LACTATE, POTASSIUM CHLORIDE, CALCIUM CHLORIDE 600; 310; 30; 20 MG/100ML; MG/100ML; MG/100ML; MG/100ML
125 INJECTION, SOLUTION INTRAVENOUS CONTINUOUS
Status: CANCELLED | OUTPATIENT
Start: 2022-08-07

## 2022-08-08 ENCOUNTER — HOSPITAL ENCOUNTER (OUTPATIENT)
Dept: GASTROENTEROLOGY | Facility: HOSPITAL | Age: 47
Setting detail: OUTPATIENT SURGERY
Discharge: HOME | End: 2022-08-08
Attending: STUDENT IN AN ORGANIZED HEALTH CARE EDUCATION/TRAINING PROGRAM
Payer: COMMERCIAL

## 2022-08-08 ENCOUNTER — ANESTHESIA (OUTPATIENT)
Dept: GASTROENTEROLOGY | Facility: HOSPITAL | Age: 47
End: 2022-08-08

## 2022-08-08 ENCOUNTER — ANESTHESIA EVENT (OUTPATIENT)
Dept: GASTROENTEROLOGY | Facility: HOSPITAL | Age: 47
End: 2022-08-08

## 2022-08-08 VITALS
DIASTOLIC BLOOD PRESSURE: 59 MMHG | WEIGHT: 234 LBS | BODY MASS INDEX: 39.95 KG/M2 | HEIGHT: 64 IN | OXYGEN SATURATION: 100 % | SYSTOLIC BLOOD PRESSURE: 129 MMHG | RESPIRATION RATE: 16 BRPM | HEART RATE: 59 BPM | TEMPERATURE: 97 F

## 2022-08-08 DIAGNOSIS — Z12.11 SCREENING FOR MALIGNANT NEOPLASM OF COLON: ICD-10-CM

## 2022-08-08 PROBLEM — E66.01 MORBID OBESITY (HCC): Status: ACTIVE | Noted: 2022-08-08

## 2022-08-08 LAB
EXT PREGNANCY TEST URINE: NEGATIVE
EXT. CONTROL: NORMAL

## 2022-08-08 PROCEDURE — G0121 COLON CA SCRN NOT HI RSK IND: HCPCS | Performed by: STUDENT IN AN ORGANIZED HEALTH CARE EDUCATION/TRAINING PROGRAM

## 2022-08-08 PROCEDURE — 81025 URINE PREGNANCY TEST: CPT | Performed by: STUDENT IN AN ORGANIZED HEALTH CARE EDUCATION/TRAINING PROGRAM

## 2022-08-08 RX ORDER — LIDOCAINE HYDROCHLORIDE 20 MG/ML
INJECTION, SOLUTION EPIDURAL; INFILTRATION; INTRACAUDAL; PERINEURAL AS NEEDED
Status: DISCONTINUED | OUTPATIENT
Start: 2022-08-08 | End: 2022-08-08

## 2022-08-08 RX ORDER — SODIUM CHLORIDE, SODIUM LACTATE, POTASSIUM CHLORIDE, CALCIUM CHLORIDE 600; 310; 30; 20 MG/100ML; MG/100ML; MG/100ML; MG/100ML
125 INJECTION, SOLUTION INTRAVENOUS CONTINUOUS
Status: DISCONTINUED | OUTPATIENT
Start: 2022-08-08 | End: 2022-08-12 | Stop reason: HOSPADM

## 2022-08-08 RX ORDER — PROPOFOL 10 MG/ML
INJECTION, EMULSION INTRAVENOUS AS NEEDED
Status: DISCONTINUED | OUTPATIENT
Start: 2022-08-08 | End: 2022-08-08

## 2022-08-08 RX ORDER — PROPOFOL 10 MG/ML
INJECTION, EMULSION INTRAVENOUS CONTINUOUS PRN
Status: DISCONTINUED | OUTPATIENT
Start: 2022-08-08 | End: 2022-08-08

## 2022-08-08 RX ADMIN — SODIUM CHLORIDE, SODIUM LACTATE, POTASSIUM CHLORIDE, AND CALCIUM CHLORIDE 125 ML/HR: .6; .31; .03; .02 INJECTION, SOLUTION INTRAVENOUS at 11:49

## 2022-08-08 RX ADMIN — LIDOCAINE HYDROCHLORIDE 50 MG: 20 INJECTION, SOLUTION EPIDURAL; INFILTRATION; INTRACAUDAL; PERINEURAL at 12:49

## 2022-08-08 RX ADMIN — SODIUM CHLORIDE, SODIUM LACTATE, POTASSIUM CHLORIDE, AND CALCIUM CHLORIDE: .6; .31; .03; .02 INJECTION, SOLUTION INTRAVENOUS at 12:37

## 2022-08-08 RX ADMIN — PROPOFOL 50 MG: 10 INJECTION, EMULSION INTRAVENOUS at 13:04

## 2022-08-08 RX ADMIN — LIDOCAINE HYDROCHLORIDE 50 MG: 20 INJECTION, SOLUTION EPIDURAL; INFILTRATION; INTRACAUDAL; PERINEURAL at 12:51

## 2022-08-08 RX ADMIN — PROPOFOL 50 MG: 10 INJECTION, EMULSION INTRAVENOUS at 12:56

## 2022-08-08 RX ADMIN — PROPOFOL 150 MG: 10 INJECTION, EMULSION INTRAVENOUS at 12:51

## 2022-08-08 RX ADMIN — PROPOFOL 50 MCG/KG/MIN: 10 INJECTION, EMULSION INTRAVENOUS at 12:55

## 2022-08-08 NOTE — H&P
History and Physical - [facility]  Juan David Ramirez 51 y o  female MRN: [id number]                  HPI: Juan David Ramirez is a 51y o  year old female who presents for colon cancer screening      REVIEW OF SYSTEMS: Per the HPI, and otherwise unremarkable      Historical Information   Past Medical History:   Diagnosis Date    Hypertension     Varicella      Past Surgical History:   Procedure Laterality Date     SECTION      x2      Social History   Social History     Substance and Sexual Activity   Alcohol Use Not Currently     Social History     Substance and Sexual Activity   Drug Use Never     Social History     Tobacco Use   Smoking Status Never Smoker   Smokeless Tobacco Never Used     Family History   Problem Relation Age of Onset    Esophageal cancer Mother     Heart disease Father     No Known Problems Sister     No Known Problems Daughter     No Known Problems Maternal Grandmother     No Known Problems Maternal Grandfather     No Known Problems Paternal Grandmother     No Known Problems Paternal Grandfather     No Known Problems Sister     No Known Problems Brother     No Known Problems Brother     No Known Problems Son     No Known Problems Maternal Aunt     No Known Problems Paternal Aunt     No Known Problems Paternal Aunt     No Known Problems Paternal Aunt     No Known Problems Paternal Aunt     No Known Problems Paternal Aunt        Meds/Allergies       Current Outpatient Medications:     Iron-Vitamin C (Vitron-C)  MG TABS    sertraline (ZOLOFT) 50 mg tablet    Diclofenac Sodium (VOLTAREN) 1 %    ibuprofen (MOTRIN) 800 mg tablet    levothyroxine (Euthyrox) 50 mcg tablet    meloxicam (Mobic) 15 mg tablet    methocarbamol (ROBAXIN) 500 mg tablet    pantoprazole (PROTONIX) 20 mg tablet    Current Facility-Administered Medications:     lactated ringers infusion, 125 mL/hr, Intravenous, Continuous, 125 mL/hr at 22 1149    No Known Allergies    Objective     /92   Pulse 74   Temp 97 8 °F (36 6 °C) (Temporal)   Resp 18   Ht 5' 4" (1 626 m)   Wt 106 kg (234 lb)   SpO2 95%   BMI 40 17 kg/m²       PHYSICAL EXAM    Gen: NAD  Head: NCAT  CV: RRR  CHEST: Clear  ABD: soft, NT/ND  EXT: no edema      ASSESSMENT/PLAN:  This is a 51y o  year old female here for colonoscopy, and she is stable and optimized for her procedure

## 2022-08-08 NOTE — ANESTHESIA PREPROCEDURE EVALUATION
Procedure:  COLONOSCOPY    Relevant Problems   ANESTHESIA (within normal limits)   (-) History of anesthesia complications      CARDIO (within normal limits)      ENDO   (+) Hypothyroidism      GI/HEPATIC  Confirmed NPO appropriate  s/p bowel prep      /RENAL (within normal limits)      GYN   (-) Currently pregnant (urinary beta hcg neg in preop holding)      HEMATOLOGY (within normal limits)      MUSCULOSKELETAL   (+) Chronic bilateral low back pain with sciatica      NEURO/PSYCH   (+) Anxiety and depression   (+) Chronic bilateral low back pain with sciatica      PULMONARY   (+) LESTER (obstructive sleep apnea)   (-) Smoking   (-) URI (upper respiratory infection)      Other   (+) Morbid obesity (HCC)        Physical Exam    Airway    Mallampati score: III  TM Distance: >3 FB  Neck ROM: full     Dental   No notable dental hx     Cardiovascular  Rhythm: regular, Rate: normal,     Pulmonary  Breath sounds clear to auscultation,     Other Findings        Anesthesia Plan  ASA Score- 3     Anesthesia Type- IV sedation with anesthesia with ASA Monitors  Additional Monitors:   Airway Plan:     Comment: I discussed the risks and benefits of IV sedation anesthesia including the possibility of the need to convert to general anesthesia and the potential risk of awareness  The patient was given the opportunity to ask questions, which were answered          Plan Factors-Exercise tolerance (METS): >4 METS  Chart reviewed  Patient is not a current smoker  Obstructive sleep apnea risk education given perioperatively  Induction- intravenous  Postoperative Plan-     Informed Consent- Anesthetic plan and risks discussed with patient  I personally reviewed this patient with the CRNA  Discussed and agreed on the Anesthesia Plan with the CRNA  Ryan Umana

## 2022-08-08 NOTE — ANESTHESIA POSTPROCEDURE EVALUATION
Post-Op Assessment Note    CV Status:  Stable  Pain Score: 0    Pain management: adequate     Mental Status:  Awake and sleepy   Hydration Status:  Euvolemic   PONV Controlled:  Controlled   Airway Patency:  Patent      Post Op Vitals Reviewed: Yes      Staff: CRNA         No complications documented      BP   130/63   Temp   97   Pulse  63   Resp   12   SpO2   97

## 2022-08-10 ENCOUNTER — OFFICE VISIT (OUTPATIENT)
Dept: FAMILY MEDICINE CLINIC | Facility: CLINIC | Age: 47
End: 2022-08-10
Payer: COMMERCIAL

## 2022-08-10 VITALS
WEIGHT: 230.25 LBS | TEMPERATURE: 97.3 F | OXYGEN SATURATION: 96 % | BODY MASS INDEX: 39.31 KG/M2 | HEART RATE: 75 BPM | DIASTOLIC BLOOD PRESSURE: 76 MMHG | SYSTOLIC BLOOD PRESSURE: 106 MMHG | HEIGHT: 64 IN

## 2022-08-10 DIAGNOSIS — F41.9 ANXIETY AND DEPRESSION: ICD-10-CM

## 2022-08-10 DIAGNOSIS — M62.838 MUSCLE SPASMS OF BOTH LOWER EXTREMITIES: Primary | ICD-10-CM

## 2022-08-10 DIAGNOSIS — F32.A ANXIETY AND DEPRESSION: ICD-10-CM

## 2022-08-10 PROCEDURE — 99214 OFFICE O/P EST MOD 30 MIN: CPT | Performed by: NURSE PRACTITIONER

## 2022-08-10 RX ORDER — METFORMIN HYDROCHLORIDE 500 MG/1
1 TABLET, EXTENDED RELEASE ORAL 2 TIMES DAILY
COMMUNITY
Start: 2022-07-18

## 2022-08-10 NOTE — ASSESSMENT & PLAN NOTE
Patient reports doing well with the Zoloft  Will maintain same dosage  Continue with self-care  Discussed taking multivitamin, increasing vitamin-D rich foods as it hurts to was winter

## 2022-08-10 NOTE — PATIENT INSTRUCTIONS
Take a good multivitamin    The Dietary Guidelines for Americans describes a healthy eating pattern as one that:   Includes a variety of vegetables, fruits, whole grains, fat-free or low-fat milk and milk products, and oils  Milk is fortified with vitamin D, as are many ready-to-eat cereals and some brands of yogurt and orange juice  Cheese naturally contains small amounts of vitamin D    Includes a variety of protein foods, including seafood, lean meats and poultry, eggs, legumes (beans and peas), nuts, seeds, and soy products  Fatty fish such as salmon, tuna, and mackerel are very good sources of vitamin D  Small amounts of vitamin D are also found in beef liver and egg yolks   Limits saturated and trans fats, added sugars, and sodium  Vitamin D is added to some margarines   Stays within your daily calorie needs  Chronic Back Pain   AMBULATORY CARE:   Chronic back pain  is back pain that lasts 3 months or longer  This may include pain that has not been controlled or does not improve with treatment  Your back pain may cause weakness or pain that spreads to your arms or legs  Call your doctor if:   · You have severe pain  · You have new numbness, tingling, or weakness, especially in your lower back, legs, arms, or genital area  · You lose control of your bladder or bowel movements  · You have a fever or sudden weight loss  · You have new or worse pain  · You have questions or concerns about your condition or care  Treatment for chronic back pain  may include any of the following:  · Acetaminophen  decreases pain and fever  It is available without a doctor's order  Ask how much to take and how often to take it  Follow directions  Read the labels of all other medicines you are using to see if they also contain acetaminophen, or ask your doctor or pharmacist  Acetaminophen can cause liver damage if not taken correctly   Do not use more than 4 grams (4,000 milligrams) total of acetaminophen in one day  · NSAIDs , such as ibuprofen, help decrease swelling, pain, and fever  This medicine is available with or without a doctor's order  NSAIDs can cause stomach bleeding or kidney problems in certain people  If you take blood thinner medicine, always ask your healthcare provider if NSAIDs are safe for you  Always read the medicine label and follow directions  · Prescription pain medicine  called narcotics or opioids may be given for certain types of chronic pain  Ask your healthcare provider how to take this medicine safely  · Anesthetics  can be rubbed on your skin or injected into a nerve or muscle to numb an area  · Other medicines  may reduce pain, anxiety, muscle tension, or swelling  · Take your medicine as directed  Contact your healthcare provider if you think your medicine is not helping or if you have side effects  Tell him of her if you are allergic to any medicine  Keep a list of the medicines, vitamins, and herbs you take  Include the amounts, and when and why you take them  Bring the list or the pill bottles to follow-up visits  Carry your medicine list with you in case of an emergency  Manage your symptoms:   · Apply ice for 15 to 20 minutes every hour, or as directed  Use an ice pack, or put crushed ice in a plastic bag  Cover it with a towel before you apply it to your skin  Ice decreases pain and helps prevent tissue damage  · Apply heat for 20 to 30 minutes every 2 hours, or as directed  Heat helps decrease pain and muscle spasms  · Use massage to loosen tense muscles  Massage may relieve back pain caused by tight muscles  Regular massages may help prevent this kind of back pain  · Ask about acupuncture for pain relief  Back pain is sometimes relieved with acupuncture  Talk to your healthcare provider before you get this treatment to make sure it is safe for you  Other ways to relieve or prevent back pain:   · Manage stress    Stress can cause back pain or make it worse  Some ways to reduce stress are listening to music, meditating, or using aromatherapy  It may help to talk with a therapist about anything that is causing you stress  Your healthcare provider can give you more information  · Stay active as much as you can without causing more pain  Ask your healthcare provider what exercises are right for you  Do not sit or lie down for long periods  This could make your back pain worse  Yoga or similar gentle movements may help relieve pain and tension in your back  Go slowly and do not strain your back as you do any movement  · Be careful when you lift heavy objects  Do not lift anything heavy until your pain is gone  Never strain your back when you lift a heavy item  If possible, ask someone to help you  · Go to physical therapy as directed  A physical therapist can teach you exercises to help improve movement and strength, and to decrease pain  Follow up with your healthcare provider as directed: You may be referred to a sports medicine or spine specialist  Write down your questions so you remember to ask them during your visits  © [employer] 2022 Information is for End User's use only and may not be sold, redistributed or otherwise used for commercial purposes  All illustrations and images included in CareNotes® are the copyrighted property of A D A M , Inc  or [facility]  The above information is an  only  It is not intended as medical advice for individual conditions or treatments  Talk to your doctor, nurse or pharmacist before following any medical regimen to see if it is safe and effective for you  Obesity   AMBULATORY CARE:   Obesity  means your body mass index (BMI) is greater than 30  Your healthcare provider will use your height and weight to measure your BMI  The risks of obesity include  many health problems, including injuries or physical disability    · Diabetes (high blood sugar level)    · High blood pressure or high cholesterol    · Heart disease    · Stroke    · Gallbladder or liver disease    · Cancer of the colon, breast, prostate, liver, or kidney    · Sleep apnea    · Arthritis or gout    Screening  is done to check for health conditions before you have signs or symptoms  If you are 28to 79years old, your blood sugar level may be checked every 3 years for signs of prediabetes or diabetes  Your healthcare provider will check your blood pressure at each visit  High blood pressure can lead to a stroke or other problems  Your provider may check for signs of heart disease, cancer, or other health problems  Seek care immediately if:   · You have a severe headache, confusion, or difficulty speaking  · You have weakness on one side of your body  · You have chest pain, sweating, or shortness of breath  Call your doctor if:   · You have symptoms of gallbladder or liver disease, such as pain in your upper abdomen  · You have knee or hip pain and discomfort while walking  · You have symptoms of diabetes, such as intense hunger and thirst, and frequent urination  · You have symptoms of sleep apnea, such as snoring or daytime sleepiness  · You have questions or concerns about your condition or care  Treatment for obesity  focuses on helping you lose weight to improve your health  Even a small decrease in BMI can reduce the risk for many health problems  Your healthcare provider will help you set a weight-loss goal   · Lifestyle changes  are the first step in treating obesity  These include making healthy food choices and getting regular physical activity  Your healthcare provider may suggest a weight-loss program that involves coaching, education, and therapy  · Medicine  may help you lose weight when it is used with a healthy foods and physical activity  · Surgery  can help you lose weight if you are very obese and have other health problems   There are several types of weight-loss surgery  Ask your healthcare provider for more information  Tips for safe weight loss:   · Set small, realistic goals  An example of a small goal is to walk for 20 minutes 5 days a week  Ari goal is to lose 5% of your body weight  · Tell friends, family members, and coworkers about your goals  and ask for their support  Ask a friend to lose weight with you, or join a weight-loss support group  · Identify foods or triggers that may cause you to overeat , and find ways to avoid them  Remove tempting high-calorie foods from your home and workplace  Place a bowl of fresh fruit on your kitchen counter  If stress causes you to eat, then find other ways to cope with stress  A counselor or therapist may be able to help you  · Keep a diary to track what you eat and drink  Also write down how many minutes of physical activity you do each day  Weigh yourself once a week and record it in your diary  Eating changes: You will need to eat 500 to 1,000 fewer calories each day than you currently eat to lose 1 to 2 pounds a week  The following changes will help you cut calories:  · Eat smaller portions  Use small plates, no larger than 9 inches in diameter  Fill your plate half full of fruits and vegetables  Measure your food using measuring cups until you know what a serving size looks like  · Eat 3 meals and 1 or 2 snacks each day  Plan your meals in advance  Bria Medeiros and eat at home most of the time  Eat slowly  Do not skip meals  Skipping meals can lead to overeating later in the day  This can make it harder for you to lose weight  Talk with a dietitian to help you make a meal plan and schedule that is right for you  · Eat fruits and vegetables at every meal   They are low in calories and high in fiber, which makes you feel full  Do not add butter, margarine, or cream sauce to vegetables  Use herbs to season steamed vegetables  · Eat less fat and fewer fried foods  Eat more baked or grilled chicken and fish  These protein sources are lower in calories and fat than red meat  Limit fast food  Dress your salads with olive oil and vinegar instead of bottled dressing  · Limit the amount of sugar you eat  Do not drink sugary beverages  Limit alcohol  Activity changes:  Physical activity is good for your body in many ways  It helps you burn calories and build strong muscles  It decreases stress and depression, and improves your mood  It can also help you sleep better  Talk to your healthcare provider before you begin an exercise program   · Exercise for at least 30 minutes 5 days a week  Start slowly  Set aside time each day for physical activity that you enjoy and that is convenient for you  It is best to do both weight training and an activity that increases your heart rate, such as walking, bicycling, or swimming  · Find ways to be more active  Do yard work and housecleaning  Walk up the stairs instead of using elevators  Spend your leisure time going to events that require walking, such as outdoor festivals or fairs  This extra physical activity can help you lose weight and keep it off  Follow up with your doctor as directed: You may need to meet with a dietitian  Write down your questions so you remember to ask them during your visits  © Copyright [employer] 2022 Information is for End User's use only and may not be sold, redistributed or otherwise used for commercial purposes  All illustrations and images included in CareNotes® are the copyrighted property of A D A M , Inc  or Laura Álvarez   The above information is an  only  It is not intended as medical advice for individual conditions or treatments  Talk to your doctor, nurse or pharmacist before following any medical regimen to see if it is safe and effective for you      Weight Management   AMBULATORY CARE:   Why it is important to manage your weight:  Being overweight increases your risk of health conditions such as heart disease, high blood pressure, type 2 diabetes, and certain types of cancer  It can also increase your risk for osteoarthritis, sleep apnea, and other respiratory problems  Aim for a slow, steady weight loss  Even a small amount of weight loss can lower your risk of health problems  Risks of being overweight:  Extra weight can cause many health problems, including the following:  · Diabetes (high blood sugar level)    · High blood pressure or high cholesterol    · Heart disease    · Stroke    · Gallbladder or liver disease    · Cancer of the colon, breast, prostate, liver, or kidney    · Sleep apnea    · Arthritis or gout    Screening  is done to check for health conditions before you have signs or symptoms  If you are 28to 79years old, your blood sugar level may be checked every 3 years for signs of prediabetes or diabetes  Your healthcare provider will check your blood pressure at each visit  High blood pressure can lead to a stroke or other problems  Your provider may check for signs of heart disease, cancer, or other health problems  How to lose weight safely:  A safe and healthy way to lose weight is to eat fewer calories and get regular exercise  · You can lose up about 1 pound a week by decreasing the number of calories you eat by 500 calories each day  You can decrease calories by eating smaller portion sizes or by cutting out high-calorie foods  Read labels to find out how many calories are in the foods you eat  · You can also burn calories with exercise such as walking, swimming, or biking  You will be more likely to keep weight off if you make these changes part of your lifestyle  Exercise at least 30 minutes per day on most days of the week  You can also fit in more physical activity by taking the stairs instead of the elevator or parking farther away from stores  Ask your healthcare provider about the best exercise plan for you  Healthy meal plan for weight management:  A healthy meal plan includes a variety of foods, contains fewer calories, and helps you stay healthy  A healthy meal plan includes the following:     · Eat whole-grain foods more often  A healthy meal plan should contain fiber  Fiber is the part of grains, fruits, and vegetables that is not broken down by your body  Whole-grain foods are healthy and provide extra fiber in your diet  Some examples of whole-grain foods are whole-wheat breads and pastas, oatmeal, brown rice, and bulgur  · Eat a variety of vegetables every day  Include dark, leafy greens such as spinach, kale, harlan greens, and mustard greens  Eat yellow and orange vegetables such as carrots, sweet potatoes, and winter squash  · Eat a variety of fruits every day  Choose fresh or canned fruit (canned in its own juice or light syrup) instead of juice  Fruit juice has very little or no fiber  · Eat low-fat dairy foods  Drink fat-free (skim) milk or 1% milk  Eat fat-free yogurt and low-fat cottage cheese  Try low-fat cheeses such as mozzarella and other reduced-fat cheeses  · Choose meat and other protein foods that are low in fat  Choose beans or other legumes such as split peas or lentils  Choose fish, skinless poultry (chicken or turkey), or lean cuts of red meat (beef or pork)  Before you cook meat or poultry, cut off any visible fat  · Use less fat and oil  Try baking foods instead of frying them  Add less fat, such as margarine, sour cream, regular salad dressing and mayonnaise to foods  Eat fewer high-fat foods  Some examples of high-fat foods include french fries, doughnuts, ice cream, and cakes  · Eat fewer sweets  Limit foods and drinks that are high in sugar  This includes candy, cookies, regular soda, and sweetened drinks  Ways to decrease calories:   · Eat smaller portions  ? Use a small plate with smaller servings  ? Do not eat second helpings  ?  When you eat at a restaurant, ask for a box and place half of your meal in the box before you eat  ? Share an entrée with someone else  · Replace high-calorie snacks with healthy, low-calorie snacks  ? Choose fresh fruit, vegetables, fat-free rice cakes, or air-popped popcorn instead of potato chips, nuts, or chocolate  ? Choose water or calorie-free drinks instead of soda or sweetened drinks  · Do not shop for groceries when you are hungry  You may be more likely to make unhealthy food choices  Take a grocery list of healthy foods and shop after you have eaten  · Eat regular meals  Do not skip meals  Skipping meals can lead to overeating later in the day  This can make it harder for you to lose weight  Eat a healthy snack in place of a meal if you do not have time to eat a regular meal  Talk with a dietitian to help you create a meal plan and schedule that is right for you  Other things to consider as you try to lose weight:   · Be aware of situations that may give you the urge to overeat, such as eating while watching television  Find ways to avoid these situations  For example, read a book, go for a walk, or do crafts  · Meet with a weight loss support group or friends who are also trying to lose weight  This may help you stay motivated to continue working on your weight loss goals  © Copyright [employer] 2022 Information is for End User's use only and may not be sold, redistributed or otherwise used for commercial purposes  All illustrations and images included in CareNotes® are the copyrighted property of [employer] Inc  or [facility] Maryann Álvarez   The above information is an  only  It is not intended as medical advice for individual conditions or treatments  Talk to your doctor, nurse or pharmacist before following any medical regimen to see if it is safe and effective for you      Low Fat Diet   AMBULATORY CARE:   A low-fat diet  is an eating plan that is low in total fat, unhealthy fat, and cholesterol  You may need to follow a low-fat diet if you have trouble digesting or absorbing fat  You may also need to follow this diet if you have high cholesterol  You can also lower your cholesterol by increasing the amount of fiber in your diet  Soluble fiber is a type of fiber that helps to decrease cholesterol levels  Different types of fat in food:   · Limit unhealthy fats  A diet that is high in cholesterol, saturated fat, and trans fat may cause unhealthy cholesterol levels  Unhealthy cholesterol levels increase your risk of heart disease  ? Cholesterol:  Limit intake of cholesterol to less than 200 mg per day  Cholesterol is found in meat, eggs, and dairy  ? Saturated fat:  Limit saturated fat to less than 7% of your total daily calories  Ask your dietitian how many calories you need each day  Saturated fat is found in butter, cheese, ice cream, whole milk, and palm oil  Saturated fat is also found in meat, such as beef, pork, chicken skin, and processed meats  Processed meats include sausage, hot dogs, and bologna  ? Trans fat:  Avoid trans fat as much as possible  Trans fat is used in fried and baked foods  Foods that say trans fat free on the label may still have up to 0 5 grams of trans fat per serving  · Include healthy fats  Replace foods that are high in saturated and trans fat with foods high in healthy fats  This may help to decrease high cholesterol levels  ? Monounsaturated fats: These are found in avocados, nuts, and vegetable oils, such as olive, canola, and sunflower oil  ? Polyunsaturated fats: These can be found in vegetable oils, such as soybean or corn oil  Omega-3 fats can help to decrease the risk of heart disease  Omega-3 fats are found in fish, such as salmon, herring, trout, and tuna  Omega-3 fats can also be found in plant foods, such as walnuts, flaxseed, soybeans, and canola oil  Foods to limit or avoid:   · Grains:      ?  Snacks that are made with partially hydrogenated oils, such as chips, regular crackers, and butter-flavored popcorn    ? High-fat baked goods, such as biscuits, croissants, doughnuts, pies, cookies, and pastries    · Dairy:      ? Whole milk, 2% milk, and yogurt and ice cream made with whole milk    ? Half and half creamer, heavy cream, and whipping cream    ? Cheese, cream cheese, and sour cream    · Meats and proteins:      ? High-fat cuts of meat (T-bone steak, regular hamburger, and ribs)    ? Fried meat, poultry (turkey and chicken), and fish    ? Poultry (chicken and turkey) with skin    ? Cold cuts (salami or bologna), hot dogs, hector, and sausage    ? Whole eggs and egg yolks    · Vegetables and fruits with added fat:      ? Fried vegetables or vegetables in butter or high-fat sauces, such as cream or cheese sauces    ? Fried fruit or fruit served with butter or cream    · Fats:      ? Butter, stick margarine, and shortening    ? Coconut, palm oil, and palm kernel oil    Foods to include:   · Grains:      ? Whole-grain breads, cereals, pasta, and brown rice    ? Low-fat crackers and pretzels    · Vegetables and fruits:      ? Fresh, frozen, or canned vegetables (no salt or low-sodium)    ? Fresh, frozen, dried, or canned fruit (canned in light syrup or fruit juice)    ? Avocado    · Low-fat dairy products:      ? Nonfat (skim) or 1% milk    ? Nonfat or low-fat cheese, yogurt, and cottage cheese    · Meats and proteins:      ? Chicken or turkey with no skin    ? Baked or broiled fish    ? Lean beef and pork (loin, round, extra lean hamburger)    ? Beans and peas, unsalted nuts, soy products    ? Egg whites and substitutes    ? Seeds and nuts    · Fats:      ? Unsaturated oil, such as canola, olive, peanut, soybean, or sunflower oil    ? Soft or liquid margarine and vegetable oil spread    ? Low-fat salad dressing    Other ways to decrease fat:   · Read food labels before you buy foods    Choose foods that have less than 30% of calories from fat  Choose low-fat or fat-free dairy products  Remember that fat free does not mean calorie free  These foods still contain calories, and too many calories can lead to weight gain  · Trim fat from meat and avoid fried food  Trim all visible fat from meat before you cook it  Remove the skin from poultry  Do not fields meat, fish, or poultry  Bake, roast, boil, or broil these foods instead  Avoid fried foods  Eat a baked potato instead of [place] fries  Steam vegetables instead of sautéing them in butter  · Add less fat to foods  Use imitation hector bits on salads and baked potatoes instead of regular hector bits  Use fat-free or low-fat salad dressings instead of regular dressings  Use low-fat or nonfat butter-flavored topping instead of regular butter or margarine on popcorn and other foods  Ways to decrease fat in recipes:  Replace high-fat ingredients with low-fat or nonfat ones  This may cause baked goods to be drier than usual  You may need to use nonfat cooking spray on pans to prevent food from sticking  You also may need to change the amount of other ingredients, such as water, in the recipe  Try the following:  · Use low-fat or light margarine instead of regular margarine or shortening  · Use lean ground turkey breast or chicken, or lean ground beef (less than 5% fat) instead of hamburger  · Add 1 teaspoon of canola oil to 8 ounces of skim milk instead of using cream or half and half  · Use grated zucchini, carrots, or apples in breads instead of coconut  · Use blenderized, low-fat cottage cheese, plain tofu, or low-fat ricotta cheese instead of cream cheese  · Use 1 egg white and 1 teaspoon of canola oil, or use ¼ cup (2 ounces) of fat-free egg substitute instead of a whole egg  · Replace half of the oil that is called for in a recipe with applesauce when you bake   Use 3 tablespoons of cocoa powder and 1 tablespoon of canola oil instead of a square of baking chocolate  How to increase fiber:  Eat enough high-fiber foods to get 20 to 30 grams of fiber every day  Slowly increase your fiber intake to avoid stomach cramps, gas, and other problems  · Eat 3 ounces of whole-grain foods each day  An ounce is about 1 slice of bread  Eat whole-grain breads, such as whole-wheat bread  Whole wheat, whole-wheat flour, or other whole grains should be listed as the first ingredient on the food label  Replace white flour with whole-grain flour or use half of each in recipes  Whole-grain flour is heavier than white flour, so you may have to add more yeast or baking powder  · Eat a high-fiber cereal for breakfast   Oatmeal is a good source of soluble fiber  Look for cereals that have bran or fiber in the name  Choose whole-grain products, such as brown rice, barley, and whole-wheat pasta  · Eat more beans, peas, and lentils  For example, add beans to soups or salads  Eat at least 5 cups of fruits and vegetables each day  Eat fruits and vegetables with the peel because the peel is high in fiber  © Copyright [employer] 2022 Information is for End User's use only and may not be sold, redistributed or otherwise used for commercial purposes  All illustrations and images included in CareNotes® are the copyrighted property of A D A M , Inc  or [address] [place], [place] [id number]   The above information is an  only  It is not intended as medical advice for individual conditions or treatments  Talk to your doctor, nurse or pharmacist before following any medical regimen to see if it is safe and effective for you  Heart Healthy Diet   AMBULATORY CARE:   A heart healthy diet  is an eating plan low in unhealthy fats and sodium (salt)  The plan is high in healthy fats and fiber  A heart healthy diet helps improve your cholesterol levels and lowers your risk for heart disease and stroke  A dietitian will teach you how to read and understand food labels    Heart healthy diet guidelines to follow:   · Choose foods that contain healthy fats  ? Unsaturated fats  include monounsaturated and polyunsaturated fats  Unsaturated fat is found in foods such as soybean, canola, olive, corn, and safflower oils  It is also found in soft tub margarine that is made with liquid vegetable oil  ? Omega-3 fat  is found in certain fish, such as salmon, tuna, and trout, and in walnuts and flaxseed  Eat fish high in omega-3 fats at least 2 times a week  · Get 20 to 30 grams of fiber each day  Fruits, vegetables, whole-grain foods, and legumes (cooked beans) are good sources of fiber  · Limit or do not have unhealthy fats  ? Cholesterol  is found in animal foods, such as eggs and lobster, and in dairy products made from whole milk  Limit cholesterol to less than 200 mg each day  ? Saturated fat  is found in meats, such as hector and hamburger  It is also found in chicken or turkey skin, whole milk, and butter  Limit saturated fat to less than 7% of your total daily calories  ? Trans fat  is found in packaged foods, such as potato chips and cookies  It is also in hard margarine, some fried foods, and shortening  Do not eat foods that contain trans fats  · Limit sodium as directed  You may be told to limit sodium to 2,000 to 2,300 mg each day  Choose low-sodium or no-salt-added foods  Add little or no salt to food you prepare  Use herbs and spices in place of salt  Include the following in your heart healthy plan:  Ask your dietitian or healthcare provider how many servings to have from each of the following food groups:  · Grains:      ? Whole-wheat breads, cereals, and pastas, and brown rice    ? Low-fat, low-sodium crackers and chips    · Vegetables:      ? Broccoli, green beans, green peas, and spinach    ? Collards, kale, and lima beans    ? Carrots, sweet potatoes, tomatoes, and peppers    ?  Canned vegetables with no salt added    · Fruits:      ? Bananas, peaches, pears, and pineapple    ? Grapes, raisins, and dates    ? Oranges, tangerines, grapefruit, orange juice, and grapefruit juice    ? Apricots, mangoes, melons, and papaya    ? Raspberries and strawberries    ? Canned fruit with no added sugar    · Low-fat dairy:      ? Nonfat (skim) milk, 1% milk, and low-fat almond, cashew, or soy milks fortified with calcium    ? Low-fat cheese, regular or frozen yogurt, and cottage cheese    · Meats and proteins:      ? Lean cuts of beef and pork (loin, leg, round), skinless chicken and turkey    ? Legumes, soy products, egg whites, or nuts    Limit or do not include the following in your heart healthy plan:   · Unhealthy fats and oils:      ? Whole or 2% milk, cream cheese, sour cream, or cheese    ? High-fat cuts of beef (T-bone steaks, ribs), chicken or turkey with skin, and organ meats such as liver    ? Butter, stick margarine, shortening, and cooking oils such as coconut or palm oil    · Foods and liquids high in sodium:      ? Packaged foods, such as frozen dinners, cookies, macaroni and cheese, and cereals with more than 300 mg of sodium per serving    ? Vegetables with added sodium, such as instant potatoes, vegetables with added sauces, or regular canned vegetables    ? Cured or smoked meats, such as hot dogs, hector, and sausage    ? High-sodium ketchup, barbecue sauce, salad dressing, pickles, olives, soy sauce, or miso    · Foods and liquids high in sugar:      ? Candy, cake, cookies, pies, or doughnuts    ? Soft drinks (soda), sports drinks, or sweetened tea    ? Canned or dry mixes for cakes, soups, sauces, or gravies    Other healthy heart guidelines:   · Do not smoke  Nicotine and other chemicals in cigarettes and cigars can cause lung and heart damage  Ask your healthcare provider for information if you currently smoke and need help to quit  E-cigarettes or smokeless tobacco still contain nicotine   Talk to your healthcare provider before you use these products  · Limit or do not drink alcohol as directed  Alcohol can damage your heart and raise your blood pressure  Your healthcare provider may give you specific daily and weekly limits  The general recommended limit is 1 drink a day for women 21 or older and for men 72 or older  Do not have more than 3 drinks in a day or 7 in a week  The recommended limit is 2 drinks a day for men 24to 59years of age  Do not have more than 4 drinks in a day or 14 in a week  A drink of alcohol is 12 ounces of beer, 5 ounces of wine, or 1½ ounces of liquor  · Exercise regularly  Exercise can help you maintain a healthy weight and improve your blood pressure and cholesterol levels  Regular exercise can also decrease your risk for heart problems  Ask your healthcare provider about the best exercise plan for you  Do not start an exercise program without asking your healthcare provider  Follow up with your doctor or cardiologist as directed:  Write down your questions so you remember to ask them during your visits  © [employer] 2022 Information is for End User's use only and may not be sold, redistributed or otherwise used for commercial purposes  All illustrations and images included in CareNotes® are the copyrighted property of A D A M , Inc  or [address] [place] [address]  The above information is an  only  It is not intended as medical advice for individual conditions or treatments  Talk to your doctor, nurse or pharmacist before following any medical regimen to see if it is safe and effective for you  Calorie Counting Diet   WHAT YOU NEED TO KNOW:   What is a calorie counting diet? It is a meal plan based on counting calories each day to reach a healthy body weight  You will need to eat fewer calories if you are trying to lose weight  Weight loss may decrease your risk for certain health problems or improve your health if you have health problems   Some of these health problems include heart disease, high blood pressure, and diabetes  What foods should I avoid? Your dietitian will tell you if you need to avoid certain foods based on your body weight and health condition  You may need to avoid high-fat foods if you are at risk for or have heart disease  You may need to eat fewer foods from the breads and starches food group if you have diabetes  How many calories are in foods? The following is a list of foods and drinks with the approximate number of calories in each  Check the food label to find the exact number of calories  A dietitian can tell you how many calories you should have from each food group each day  · Carbohydrate:      ? ½ of a 3-inch bagel, 1 slice of bread, or ½ of a hamburger bun or hot dog bun (80)    ? 1 (8-inch) flour tortilla or ½ cup of cooked rice (100)    ? 1 (6-inch) corn tortilla (80)    ? 1 (6-inch) pancake or 1 cup of bran flakes cereal (110)    ? ½ cup of cooked cereal (80)    ? ½ cup of cooked pasta (85)    ? 1 ounce of pretzels (100)    ? 3 cups of air-popped popcorn without butter or oil (80)    · Dairy:      ? 1 cup of skim or 1% milk (90)    ? 1 cup of 2% milk (120)    ? 1 cup of whole milk (160)    ? 1 cup of 2% chocolate milk (220)    ? 1 ounce of low-fat cheese with 3 grams of fat per ounce (70)    ? 1 ounce of cheddar cheese (114)    ? ½ cup of 1% fat cottage cheese (80)    ? 1 cup of plain or sugar-free, fat-free yogurt (90)    · Protein foods:      ? 3 ounces of fish (not breaded or fried) (95)    ? 3 ounces of breaded, fried fish (195)    ? ¾ cup of tuna canned in water (105)    ? 3 ounces of chicken breast without skin (105)    ? 1 fried chicken breast with skin (350)    ? ¼ cup of fat free egg substitute (40)    ? 1 large egg (75)    ? 3 ounces of lean beef or pork (165)    ? 3 ounces of fried pork chop or ham (185)    ? ½ cup of cooked dried beans, such as kidney, pastrana, lentils, or navy (115)    ? 3 ounces of bologna or lunch meat (225)    ?  2 links of breakfast sausage (140)    · Vegetables:      ? ½ cup of sliced mushrooms (10)    ? 1 cup of salad greens, such as lettuce, spinach, or kenzie (15)    ? ½ cup of steamed asparagus (20)    ? ½ cup of cooked summer squash, zucchini squash, or green or wax beans (25)    ? 1 cup of broccoli or cauliflower florets, or 1 medium tomato (25)    ? 1 large raw carrot or ½ cup of cooked carrots (40)    ? ? of a medium cucumber or 1 stalk of celery (5)    ? 1 small baked potato (160)    ? 1 cup of breaded, fried vegetables (230)    · Fruit:      ? 1 (6-inch) banana (55)     ? ½ of a 4-inch grapefruit (55)    ? 15 grapes (60)    ? 1 medium orange or apple (70)    ? 1 large peach (65)    ? 1 cup of fresh pineapple chunks (75)    ? 1 cup of melon cubes (50)    ? 1¼ cups of whole strawberries (45)    ? ½ cup of fruit canned in juice (55)    ? ½ cup of fruit canned in heavy syrup (110)    ? ? cup of raisins (130)    ? ½ cup of unsweetened fruit juice (60)    ? ½ cup of grape, cranberry, or prune juice (90)    · Fat:      ? 10 peanuts or 2 teaspoons of peanut butter (55)    ? 2 tablespoons of avocado or 1 tablespoon of regular salad dressing (45)    ? 2 slices of hector (90)    ? 1 teaspoon of oil, such as safflower, canola, corn, or olive oil (45)    ? 2 teaspoons of low-fat margarine, or 1 tablespoon of low-fat mayonnaise (50)    ? 1 teaspoon of regular margarine (40)    ? 1 tablespoon of regular mayonnaise (135)    ? 1 tablespoon of cream cheese or 2 tablespoons of low-fat cream cheese (45)    ? 2 tablespoons of vegetable shortening (215)    · Dessert and sweets:      ? 8 animal crackers or 5 vanilla wafers (80)    ? 1 frozen fruit juice bar (80)    ? ½ cup of ice milk or low-fat frozen yogurt (90)    ? ½ cup of sherbet or sorbet (125)    ? ½ cup of sugar-free pudding or custard (60)    ? ½ cup of ice cream (140)    ?  ½ cup of pudding or custard (175)    ? 1 (2-inch) square chocolate brownie (185)    · Combination foods: ? Villegas burrito made with an 8-inch tortilla, without cheese (275)    ? Chicken breast sandwich with lettuce and tomato (325)    ? 1 cup of chicken noodle soup (60)    ? 1 beef taco (175)    ? Regular hamburger with lettuce and tomato (310)    ? Regular cheeseburger with lettuce and tomato (410)     ? ¼ of a 12-inch cheese pizza (280)    ? Fried fish sandwich with lettuce and tomato (425)    ? Hot dog and bun (275)    ? 1½ cups of macaroni and cheese (310)    ? Taco salad with a fried tortilla shell (870)    · Low-calorie foods:      ? 1 tablespoon of ketchup or 1 tablespoon of fat free sour cream (15)    ? 1 teaspoon of mustard (5)    ? ¼ cup of salsa (20)    ? 1 large dill pickle (15)    ? 1 tablespoon of fat free salad dressing (10)    ? 2 teaspoons of low-sugar, light jam or jelly, or 1 tablespoon of sugar-free syrup (15)    ? 1 sugar-free popsicle (15)    ? 1 cup of club soda, seltzer water, or diet soda (0)    CARE AGREEMENT:   You have the right to help plan your care  Discuss treatment options with your healthcare provider to decide what care you want to receive  You always have the right to refuse treatment  The above information is an  only  It is not intended as medical advice for individual conditions or treatments  Talk to your doctor, nurse or pharmacist before following any medical regimen to see if it is safe and effective for you  © Copyright [employer] 2022 Information is for End User's use only and may not be sold, redistributed or otherwise used for commercial purposes   All illustrations and images included in CareNotes® are the copyrighted property of [employer]  or [address] [place] [address]

## 2022-08-10 NOTE — PROGRESS NOTES
Assessment/Plan:     Anxiety and depression  Patient reports doing well with the Zoloft  Will maintain same dosage  Continue with self-care  Discussed taking multivitamin, increasing vitamin-D rich foods as it hurts to was winter  Muscle spasms of both lower extremities  Continue medication  Use compression stockings  Continue with physical therapy  Problem List Items Addressed This Visit        Other    Anxiety and depression     Patient reports doing well with the Zoloft  Will maintain same dosage  Continue with self-care  Discussed taking multivitamin, increasing vitamin-D rich foods as it hurts to was winter  Relevant Medications    sertraline (ZOLOFT) 50 mg tablet    Muscle spasms of both lower extremities - Primary     Continue medication  Use compression stockings  Continue with physical therapy  Subjective:      Patient ID: Florence Muniz is a 51 y o  female  Back Pain    Knee Pain         The following portions of the patient's history were reviewed and updated as appropriate: allergies, current medications, past family history, past medical history, past social history, past surgical history and problem list     Review of Systems   Constitutional: Negative  HENT: Negative  Eyes: Negative  Respiratory: Negative  Cardiovascular: Negative  Gastrointestinal: Negative  Endocrine: Negative  Genitourinary: Negative  Musculoskeletal: Positive for back pain  Allergic/Immunologic: Negative  Neurological: Negative  Psychiatric/Behavioral: Positive for dysphoric mood (Improved with Zoloft)  Objective:      /76 (BP Location: Left arm, Patient Position: Sitting, Cuff Size: Large)   Pulse 75   Temp (!) 97 3 °F (36 3 °C) (Temporal)   Ht 5' 4" (1 626 m)   Wt 104 kg (230 lb 4 oz)   SpO2 96%   BMI 39 52 kg/m²          Physical Exam  Vitals and nursing note reviewed     Constitutional:       Appearance: She is well-developed  She is obese  HENT:      Head: Normocephalic and atraumatic  Cardiovascular:      Rate and Rhythm: Normal rate and regular rhythm  Pulses: Normal pulses  Heart sounds: Normal heart sounds  Pulmonary:      Effort: Pulmonary effort is normal       Breath sounds: Normal breath sounds  Musculoskeletal:         General: Normal range of motion  Cervical back: Normal range of motion  Skin:     General: Skin is warm and dry  Neurological:      General: No focal deficit present  Mental Status: She is alert and oriented to person, place, and time  Psychiatric:         Mood and Affect: Mood normal          Behavior: Behavior normal          Thought Content: Thought content normal          Judgment: Judgment normal          BMI Counseling: Body mass index is 39 52 kg/m²  The BMI is above normal  Nutrition recommendations include reducing portion sizes, decreasing overall calorie intake, 3-5 servings of fruits/vegetables daily, reducing fast food intake, consuming healthier snacks, decreasing soda and/or juice intake, moderation in carbohydrate intake, increasing intake of lean protein, reducing intake of saturated fat and trans fat and reducing intake of cholesterol  Exercise recommendations include exercising 3-5 times per week

## 2022-08-11 ENCOUNTER — OFFICE VISIT (OUTPATIENT)
Dept: PHYSICAL THERAPY | Facility: CLINIC | Age: 47
End: 2022-08-11
Payer: COMMERCIAL

## 2022-08-11 DIAGNOSIS — M53.3 SACROILIAC JOINT DYSFUNCTION: ICD-10-CM

## 2022-08-11 DIAGNOSIS — R29.898 WEAKNESS OF BOTH HIPS: ICD-10-CM

## 2022-08-11 DIAGNOSIS — R19.8 ABDOMINAL WEAKNESS: ICD-10-CM

## 2022-08-11 DIAGNOSIS — M47.816 LUMBAR FACET ARTHROPATHY: Primary | ICD-10-CM

## 2022-08-11 DIAGNOSIS — M17.12 PRIMARY OSTEOARTHRITIS OF LEFT KNEE: ICD-10-CM

## 2022-08-11 DIAGNOSIS — M51.36 DEGENERATIVE DISC DISEASE, LUMBAR: ICD-10-CM

## 2022-08-11 DIAGNOSIS — M62.9 HAMSTRING TIGHTNESS OF BOTH LOWER EXTREMITIES: ICD-10-CM

## 2022-08-11 DIAGNOSIS — S39.012D LUMBAR SPINE STRAIN, SUBSEQUENT ENCOUNTER: ICD-10-CM

## 2022-08-11 PROCEDURE — 97110 THERAPEUTIC EXERCISES: CPT

## 2022-08-11 PROCEDURE — 97140 MANUAL THERAPY 1/> REGIONS: CPT

## 2022-08-11 PROCEDURE — 97112 NEUROMUSCULAR REEDUCATION: CPT

## 2022-08-11 NOTE — PROGRESS NOTES
Daily Note     Today's date: 2022  Patient name: Anastasia Rosario  : 1975  MRN: [id number]  Referring provider: Martin Alex DO  Dx:   Encounter Diagnosis     ICD-10-CM    1  Lumbar facet arthropathy  M47 816    2  Degenerative disc disease, lumbar  M51 36    3  Sacroiliac joint dysfunction  M53 3    4  Lumbar spine strain, subsequent encounter  S39 012D    5  Abdominal weakness  R19 8    6  Weakness of both hips  R29 898    7  Hamstring tightness of both lower extremities  M62 9    8  Primary osteoarthritis of left knee  M17 12                   Subjective: Patient reports discomfort after working a long shift standing  Her L knee has been "pretty ok" but she now reports R knee pain  She arrived late to PT and was accommodated  Objective: See treatment diary below      Assessment: Program modified to accommodate patient  Tolerated treatment well  Added in core stabilization exercises as charted with no discomfort reported  Slight trunk rotation with L hip extension due to decreased core stability  Patient demonstrated fatigue post treatment and would benefit from continued PT      Plan: Progress treatment as tolerated  Precautions: N/A      Manuals           Piriformis stretch  1x5 20" ea 1x5  20" ea          Hamstring stretch  1x5 20" ea 1x5  20" ea                                    Neuro Re-Ed             Pt education 8' 5'           PPT  1x10 5" 1x15  5"          bridges  2x10 3" 2x10 3"          Prone hip ext  1x10 3" ea 2x10   3" ea          BKFO +TA   1x10 B           Marches +TA   Supine  2x10           Pullovers +TA   5# 10x           Ther Ex             LTR 2x10 2x10 2x10 5"          SKTC 1x5 10" 1x5 10" 1x10 B  10"          3-way physioball rollout 1x10 1x10 3" 1x10   3"          Seated hamstring stretch 1x5 10"            Prone press up  1x10 time          Standing lumbar ext  1x10 3" time          Standing hip abd   NV?                        Ther Activity Gait Training                                       Modalities

## 2022-08-16 ENCOUNTER — APPOINTMENT (OUTPATIENT)
Dept: PHYSICAL THERAPY | Facility: CLINIC | Age: 47
End: 2022-08-16
Payer: COMMERCIAL

## 2022-08-18 ENCOUNTER — OFFICE VISIT (OUTPATIENT)
Dept: PHYSICAL THERAPY | Facility: CLINIC | Age: 47
End: 2022-08-18
Payer: COMMERCIAL

## 2022-08-18 DIAGNOSIS — M62.9 HAMSTRING TIGHTNESS OF BOTH LOWER EXTREMITIES: ICD-10-CM

## 2022-08-18 DIAGNOSIS — S39.012D LUMBAR SPINE STRAIN, SUBSEQUENT ENCOUNTER: ICD-10-CM

## 2022-08-18 DIAGNOSIS — M53.3 SACROILIAC JOINT DYSFUNCTION: ICD-10-CM

## 2022-08-18 DIAGNOSIS — M47.816 LUMBAR FACET ARTHROPATHY: Primary | ICD-10-CM

## 2022-08-18 DIAGNOSIS — M51.36 DEGENERATIVE DISC DISEASE, LUMBAR: ICD-10-CM

## 2022-08-18 DIAGNOSIS — R19.8 ABDOMINAL WEAKNESS: ICD-10-CM

## 2022-08-18 DIAGNOSIS — R29.898 WEAKNESS OF BOTH HIPS: ICD-10-CM

## 2022-08-18 DIAGNOSIS — M17.12 PRIMARY OSTEOARTHRITIS OF LEFT KNEE: ICD-10-CM

## 2022-08-18 PROCEDURE — 97110 THERAPEUTIC EXERCISES: CPT | Performed by: PHYSICAL THERAPIST

## 2022-08-18 PROCEDURE — 97140 MANUAL THERAPY 1/> REGIONS: CPT | Performed by: PHYSICAL THERAPIST

## 2022-08-18 PROCEDURE — 97530 THERAPEUTIC ACTIVITIES: CPT | Performed by: PHYSICAL THERAPIST

## 2022-08-18 PROCEDURE — 97112 NEUROMUSCULAR REEDUCATION: CPT | Performed by: PHYSICAL THERAPIST

## 2022-08-18 NOTE — PROGRESS NOTES
Daily Note     Today's date: 2022  Patient name: Leslie Thomas  : 1975  MRN: [id number]  Referring provider: Daly Harrison DO  Dx:   Encounter Diagnosis     ICD-10-CM    1  Lumbar facet arthropathy  M47 816    2  Degenerative disc disease, lumbar  M51 36    3  Sacroiliac joint dysfunction  M53 3    4  Lumbar spine strain, subsequent encounter  S39 012D    5  Abdominal weakness  R19 8    6  Weakness of both hips  R29 898    7  Hamstring tightness of both lower extremities  M62 9    8  Primary osteoarthritis of left knee  M17 12        Start Time: 1545  Stop Time: 1630  Total time in clinic (min): 45 minutes    Subjective: Pt reports she continues to have decreased pain in her low back overall and at work  Objective: See treatment diary below      Assessment: Tolerated treatment well  Patient demonstrated fatigue post treatment, exhibited good technique with therapeutic exercises and would benefit from continued PT  Pt was able to progress today with inclusion of STS from chair into pt's exercise program  Pt required min verbal cues to facilitate performance of proper technique  Assess pt response to treatment at next visit  Plan: Continue per plan of care        Precautions: N/A      Manuals          Piriformis stretch  1x5 20" ea 1x5  20" ea 1x5 20: ea         Hamstring stretch  1x5 20" ea 1x5  20" ea 1x5 20" ea                                   Neuro Re-Ed             Pt education 8' 5'           PPT  1x10 5" 1x15  5" 1x15 5"         bridges  2x10 3" 2x10 3" 2x10 3"         Prone hip ext  1x10 3" ea 2x10   3" ea          BKFO +TA   1x10 B           Marches +TA   Supine  2x10  2x10         Pullovers +TA   5# 10x  5# 10x         Ther Ex             LTR 2x10 2x10 2x10 5" 2x10         SKTC 1x5 10" 1x5 10" 1x10 B  10" 1x10 10"         3-way physioball rollout 1x10 1x10 3" 1x10   3" 1x10 3"         Seated hamstring stretch 1x5 10"            Prone press up  1x10 time 1x10         Standing lumbar ext  1x10 3" time 1x10         Standing hip abd   NV?           Recumbent bike    6'         Ther Activity             STS from chair    2x10         Cuing for proper technique    3'         Gait Training                                       Modalities

## 2022-08-22 ENCOUNTER — TELEPHONE (OUTPATIENT)
Dept: PSYCHIATRY | Facility: CLINIC | Age: 47
End: 2022-08-22

## 2022-08-23 ENCOUNTER — OFFICE VISIT (OUTPATIENT)
Dept: PHYSICAL THERAPY | Facility: CLINIC | Age: 47
End: 2022-08-23
Payer: COMMERCIAL

## 2022-08-23 DIAGNOSIS — M17.12 PRIMARY OSTEOARTHRITIS OF LEFT KNEE: ICD-10-CM

## 2022-08-23 DIAGNOSIS — R19.8 ABDOMINAL WEAKNESS: ICD-10-CM

## 2022-08-23 DIAGNOSIS — M53.3 SACROILIAC JOINT DYSFUNCTION: ICD-10-CM

## 2022-08-23 DIAGNOSIS — M47.816 LUMBAR FACET ARTHROPATHY: Primary | ICD-10-CM

## 2022-08-23 DIAGNOSIS — M62.9 HAMSTRING TIGHTNESS OF BOTH LOWER EXTREMITIES: ICD-10-CM

## 2022-08-23 DIAGNOSIS — M51.36 DEGENERATIVE DISC DISEASE, LUMBAR: ICD-10-CM

## 2022-08-23 DIAGNOSIS — S39.012D LUMBAR SPINE STRAIN, SUBSEQUENT ENCOUNTER: ICD-10-CM

## 2022-08-23 DIAGNOSIS — R29.898 WEAKNESS OF BOTH HIPS: ICD-10-CM

## 2022-08-23 PROCEDURE — 97110 THERAPEUTIC EXERCISES: CPT

## 2022-08-23 PROCEDURE — 97140 MANUAL THERAPY 1/> REGIONS: CPT

## 2022-08-23 PROCEDURE — 97112 NEUROMUSCULAR REEDUCATION: CPT

## 2022-08-23 PROCEDURE — 97530 THERAPEUTIC ACTIVITIES: CPT

## 2022-08-23 NOTE — PROGRESS NOTES
Daily Note     Today's date: 2022  Patient name: Gema Renee  : 1975  MRN: [id number]  Referring provider: Skinny Carlos DO  Dx:   Encounter Diagnosis     ICD-10-CM    1  Lumbar facet arthropathy  M47 816    2  Degenerative disc disease, lumbar  M51 36    3  Sacroiliac joint dysfunction  M53 3    4  Lumbar spine strain, subsequent encounter  S39 012D    5  Abdominal weakness  R19 8    6  Weakness of both hips  R29 898    7  Hamstring tightness of both lower extremities  M62 9    8  Primary osteoarthritis of left knee  M17 12                   Subjective: Pt reports that she is doing well overall today but does not b/l knee pain 6/10 upon presentation  Notes that she continues to see progress with coming to therapy  Objective: See treatment diary below      Assessment: Tolerated treatment well  Patient demonstrated fatigue post treatment, exhibited good technique with therapeutic exercises and would benefit from continued PT  Pt demonstrated a better understanding of her exercises today with less cuing needed  Mostly challenged with the prone press ups but overall is making good progress towards her long term goals  She felt decreased pain and looser to end  Assess pt response to treatment at next visit  Plan: Continue per plan of care        Precautions: N/A      Manuals  8        Piriformis stretch  1x5 20" ea 1x5  20" ea 1x5 20: ea 1x5 20: ea        Hamstring stretch  1x5 20" ea 1x5  20" ea 1x5 20" ea 1x5 20: ea                                  Neuro Re-Ed             Pt education 8' 5'           PPT  1x10 5" 1x15  5" 1x15 5" 1x15 5"        bridges  2x10 3" 2x10 3" 2x10 3" 2x10 3"        Prone hip ext  1x10 3" ea 2x10   3" ea          BKFO +TA   1x10 B           Marches +TA   Supine  2x10  2x10 2x10        Pullovers +TA   5# 10x  5# 10x 5# 10x        Ther Ex             LTR 2x10 2x10 2x10 5" 2x10 2x10        SKTC 1x5 10" 1x5 10" 1x10 B  10" 1x10 10" 1x10 10"        3-way physioball rollout 1x10 1x10 3" 1x10   3" 1x10 3" 1x10 3"        Seated hamstring stretch 1x5 10"            Prone press up  1x10 time 1x10 1x10        Standing lumbar ext  1x10 3" time 1x10 1x10        Standing hip abd   NV?           Recumbent bike    6' 6'        Ther Activity             STS from chair    2x10 2x10        Cuing for proper technique    3' 3'        Gait Training                                       Modalities

## 2022-08-25 ENCOUNTER — TELEMEDICINE (OUTPATIENT)
Dept: FAMILY MEDICINE CLINIC | Facility: CLINIC | Age: 47
End: 2022-08-25
Payer: COMMERCIAL

## 2022-08-25 DIAGNOSIS — R68.89 FLU-LIKE SYMPTOMS: ICD-10-CM

## 2022-08-25 DIAGNOSIS — R05.9 COUGH: ICD-10-CM

## 2022-08-25 DIAGNOSIS — J00 ACUTE NASOPHARYNGITIS: Primary | ICD-10-CM

## 2022-08-25 LAB
SARS-COV-2 AG UPPER RESP QL IA: POSITIVE
VALID CONTROL: ABNORMAL

## 2022-08-25 PROCEDURE — 99213 OFFICE O/P EST LOW 20 MIN: CPT | Performed by: NURSE PRACTITIONER

## 2022-08-25 PROCEDURE — 87811 SARS-COV-2 COVID19 W/OPTIC: CPT | Performed by: NURSE PRACTITIONER

## 2022-08-25 RX ORDER — BENZONATATE 100 MG/1
100 CAPSULE ORAL 3 TIMES DAILY PRN
Qty: 20 CAPSULE | Refills: 0 | Status: SHIPPED | OUTPATIENT
Start: 2022-08-25 | End: 2022-09-11

## 2022-08-25 RX ORDER — AZITHROMYCIN 250 MG/1
TABLET, FILM COATED ORAL
Qty: 6 TABLET | Refills: 0 | Status: SHIPPED | OUTPATIENT
Start: 2022-08-25 | End: 2022-08-30

## 2022-08-25 RX ORDER — PREDNISONE 20 MG/1
20 TABLET ORAL DAILY
Qty: 5 TABLET | Refills: 0 | Status: SHIPPED | OUTPATIENT
Start: 2022-08-25 | End: 2022-09-11

## 2022-08-25 NOTE — PATIENT INSTRUCTIONS
Take Tessalon perles for cough  Take Prednisone and Zpak as directed  Increase fluids  Rest  Isolate to prevent spreading infection  Upper Respiratory Infection   AMBULATORY CARE:   An upper respiratory infection  is also called a cold  Your nose, throat, ears, and sinuses may be affected  You are more likely to get a cold in the winter  Your risk of getting a cold may be increased if you smoke cigarettes or have allergies, such as hay fever  What causes a cold? A cold is caused by a virus  Many viruses can cause a cold, and each is contagious  This means the virus can be easily spread to another person when the sick person coughs or sneezes  The virus can also be spread if you touch an object the virus is on and then touch your eyes, mouth, or nose  Cold symptoms  are usually worst for the first 3 to 5 days  You may have any of the following:  Runny or stuffy nose    Sneezing and coughing    Sore throat or hoarseness    Red, watery, and sore eyes    Fatigue (you feel more tired than usual)    Chills and fever    Headache, body aches, or sore muscles    Call your local emergency number (911 in the [address] [place] [address],[address]) if:   You have chest pain or trouble breathing  Seek care immediately if:   You have a fever over 102ºF (39ºC)  Call your doctor if:   You have a low fever  Your sore throat gets worse or you see white or yellow spots in your throat  Your symptoms get worse after 3 to 5 days or are not better in 14 days  You have a rash anywhere on your skin  You have large, tender lumps in your neck  You have thick, green, or yellow drainage from your nose  You cough up thick yellow, green, or bloody mucus  You have a bad earache  You have questions or concerns about your condition or care  Treatment:  Colds are caused by viruses and do not get better with antibiotics  Most people get better in 7 to 14 days  You may continue to cough for 2 to 3 weeks   The following may help decrease your symptoms:  Decongestants  help reduce nasal congestion and help you breathe more easily  If you take decongestant pills, they may make you feel restless or not able to sleep  Do not use decongestant sprays for more than a few days  Cough suppressants  help reduce coughing  Ask your healthcare provider which type of cough medicine is best for you  NSAIDs , such as ibuprofen, help decrease swelling, pain, and fever  NSAIDs can cause stomach bleeding or kidney problems in certain people  If you take blood thinner medicine, always ask your healthcare provider if NSAIDs are safe for you  Always read the medicine label and follow directions  Acetaminophen  decreases pain and fever  It is available without a doctor's order  Ask how much to take and how often to take it  Follow directions  Read the labels of all other medicines you are using to see if they also contain acetaminophen, or ask your doctor or pharmacist  Acetaminophen can cause liver damage if not taken correctly  Do not use more than 4 grams (4,000 milligrams) total of acetaminophen in one day  Manage a cold:   Rest as much as possible  Slowly start to do more each day  Drink more liquids as directed  Liquids will help thin and loosen mucus so you can cough it up  Liquids will also help prevent dehydration  Liquids that help prevent dehydration include water, fruit juice, and broth  Do not drink liquids that contain caffeine  Caffeine can increase your risk for dehydration  Ask your healthcare provider how much liquid to drink each day  Soothe a sore throat  Gargle with warm salt water  Make salt water by dissolving ¼ teaspoon salt in 1 cup warm water  You may also suck on hard candy or throat lozenges  You may use a sore throat spray  Use a humidifier or vaporizer  Use a cool mist humidifier or a vaporizer to increase air moisture in your home  This may make it easier for you to breathe and help decrease your cough      Use saline nasal drops as directed  These help relieve congestion  Apply petroleum-based jelly around the outside of your nostrils  This can decrease irritation from blowing your nose  Do not smoke  Nicotine and other chemicals in cigarettes and cigars can make your symptoms worse  They can also cause infections such as bronchitis or pneumonia  Ask your healthcare provider for information if you currently smoke and need help to quit  E-cigarettes or smokeless tobacco still contain nicotine  Talk to your healthcare provider before you use these products  Prevent a cold: Wash your hands often  Use soap and water every time you wash your hands  Rub your soapy hands together, lacing your fingers  Use the fingers of one hand to scrub under the nails of the other hand  Wash for at least 20 seconds  Rinse with warm, running water for several seconds  Then dry your hands  Use germ-killing gel if soap and water are not available  Do not touch your eyes or mouth without washing your hands first          Cover a sneeze or cough  Use a tissue that covers your mouth and nose  Put the used tissue in the trash right away  Use the bend of your arm if a tissue is not available  Wash your hands well with soap and water or use a hand   Do not stand close to anyone who is sneezing or coughing  Try to stay away from others while you are sick  This is especially important during the first 2 to 3 days when the virus is more easily spread  Wait until a fever, cough, or other symptoms are gone before you return to work or other regular activities  Do not share items while you are sick  This includes food, drinks, eating utensils, and dishes  Follow up with your doctor as directed:  Write down your questions so you remember to ask them during your visits  © Copyright [employer] 2022 Information is for End User's use only and may not be sold, redistributed or otherwise used for commercial purposes   All illustrations and images included in CareNotes® are the copyrighted property of A D A M [employer] Inc  or Laura Suh  The above information is an  only  It is not intended as medical advice for individual conditions or treatments  Talk to your doctor, nurse or pharmacist before following any medical regimen to see if it is safe and effective for you

## 2022-08-25 NOTE — PROGRESS NOTES
Virtual Regular Visit    Verification of patient location:    Patient is located in the following state in which I hold an active license [place]      Assessment/Plan:    Problem List Items Addressed This Visit        Digestive    Acute nasopharyngitis - Primary     Pt has flu-like symptoms  Zpak and prednisone ordered  Encourage rest, increase fluid intake, vaporizer, steam inhalation, isolation  Relevant Medications    azithromycin (Zithromax) 250 mg tablet    predniSONE 20 mg tablet       Other    Cough     Tessalon perles ordered  Encourage warm fluids, tea with honey  Relevant Medications    benzonatate (TESSALON PERLES) 100 mg capsule    Flu-like symptoms     COVID swab ordered  Discussed management of symptoms  Discussed maintaining quarantine  Note given for work         Relevant Orders    Poct Covid 19 Rapid Antigen Test               Reason for visit is   Chief Complaint   Patient presents with    Virtual Regular Visit        Encounter provider SKYE Maxwell    Provider located at [address]      Recent Visits  No visits were found meeting these conditions  Showing recent visits within past 7 days and meeting all other requirements  Today's Visits  Date Type Provider Dept   08/25/22 Telemedicine Coral Vásquez [address] [place] [address] Primary Care   Showing today's visits and meeting all other requirements  Future Appointments  No visits were found meeting these conditions  Showing future appointments within next 150 days and meeting all other requirements       The patient was identified by name and date of birth  Gilles Whipple was informed that this is a telemedicine visit and that the visit is being conducted through [address] [place] [address] Now and patient was informed that this is a secure, HIPAA-compliant platform  She agrees to proceed     My office door was closed  No one else was in the room    She acknowledged consent and understanding of privacy and security of the video platform  The patient has agreed to participate and understands they can discontinue the visit at any time  Patient is aware this is a billable service  Subjective  Gema Renee is a 51 y o  female    Pt presents with Flu-like symptoms, cough, chills, nasal congestion, sore throat and feels warm  Chest tightness at times  Pt was in a concert on  and started having symptoms the next day  Daughter was also sick but tested negative for COVID  Past Medical History:   Diagnosis Date    Hypertension     Varicella        Past Surgical History:   Procedure Laterality Date     SECTION      x2        Current Outpatient Medications   Medication Sig Dispense Refill    azithromycin (Zithromax) 250 mg tablet Take 2 tablets (500 mg total) by mouth daily for 1 day, THEN 1 tablet (250 mg total) daily for 4 days  6 tablet 0    benzonatate (TESSALON PERLES) 100 mg capsule Take 1 capsule (100 mg total) by mouth 3 (three) times a day as needed for cough 20 capsule 0    predniSONE 20 mg tablet Take 1 tablet (20 mg total) by mouth daily 5 tablet 0    Diclofenac Sodium (VOLTAREN) 1 % Apply 2 g topically in the morning and 2 g at noon and 2 g in the evening and 2 g before bedtime   150 g 3    ibuprofen (MOTRIN) 800 mg tablet Take 800 mg by mouth every 6 to 8 hours if needed for pain      Iron-Vitamin C (Vitron-C)  MG TABS Take 1 tablet by mouth 2 (two) times a day 60 tablet 3    levothyroxine (Euthyrox) 50 mcg tablet Take 1 tablet (50 mcg total) by mouth daily in the early morning 90 tablet 3    meloxicam (Mobic) 15 mg tablet Take 1 tablet (15 mg total) by mouth daily (Patient not taking: Reported on 8/10/2022) 30 tablet 1    metFORMIN (GLUCOPHAGE-XR) 500 mg 24 hr tablet Take 1 tablet by mouth 2 (two) times a day      methocarbamol (ROBAXIN) 500 mg tablet Take 1 tablet (500 mg total) by mouth in the morning and 1 tablet (500 mg total) at noon and 1 tablet (500 mg total) in the evening and 1 tablet (500 mg total) before bedtime  30 tablet 0    pantoprazole (PROTONIX) 20 mg tablet Take 1 tablet (20 mg total) by mouth daily before breakfast (Patient not taking: Reported on 8/10/2022) 30 tablet 5    sertraline (ZOLOFT) 50 mg tablet Take 1 tablet (50 mg total) by mouth daily 90 tablet 5     No current facility-administered medications for this visit  No Known Allergies    Review of Systems   Constitutional: Positive for chills  HENT: Positive for congestion, ear pain, rhinorrhea, sinus pain and sore throat  Respiratory: Positive for cough and chest tightness  Video Exam    There were no vitals filed for this visit  Physical Exam  Constitutional:       Appearance: She is ill-appearing  HENT:      Mouth/Throat:      Comments: Hoarse voice  Neurological:      Mental Status: She is alert            I spent 15 minutes directly with the patient during this visit

## 2022-08-25 NOTE — ASSESSMENT & PLAN NOTE
Pt has flu-like symptoms  Zpak and prednisone ordered  Encourage rest, increase fluid intake, vaporizer, steam inhalation, isolation

## 2022-08-25 NOTE — ASSESSMENT & PLAN NOTE
COVID swab ordered  Discussed management of symptoms  Discussed maintaining quarantine    Note given for work

## 2022-08-29 ENCOUNTER — TELEMEDICINE (OUTPATIENT)
Dept: FAMILY MEDICINE CLINIC | Facility: CLINIC | Age: 47
End: 2022-08-29
Payer: COMMERCIAL

## 2022-08-29 VITALS — TEMPERATURE: 99.2 F

## 2022-08-29 DIAGNOSIS — U07.1 COVID-19: Primary | ICD-10-CM

## 2022-08-29 DIAGNOSIS — R05.9 COUGH: ICD-10-CM

## 2022-08-29 PROCEDURE — 99213 OFFICE O/P EST LOW 20 MIN: CPT | Performed by: NURSE PRACTITIONER

## 2022-08-29 RX ORDER — NIRMATRELVIR AND RITONAVIR 300-100 MG
3 KIT ORAL 2 TIMES DAILY
Qty: 30 TABLET | Refills: 0 | Status: SHIPPED | OUTPATIENT
Start: 2022-08-29 | End: 2022-09-03

## 2022-08-29 RX ORDER — PROMETHAZINE HYDROCHLORIDE AND CODEINE PHOSPHATE 6.25; 1 MG/5ML; MG/5ML
5 SYRUP ORAL EVERY 4 HOURS PRN
Qty: 118 ML | Refills: 0 | Status: SHIPPED | OUTPATIENT
Start: 2022-08-29 | End: 2022-09-11

## 2022-08-29 NOTE — PROGRESS NOTES
Virtual Regular Visit    Verification of patient location:    Patient is located in the following state in which I hold an active license [place]      Assessment/Plan:    Problem List Items Addressed This Visit        Other    Cough    Relevant Medications    promethazine-codeine (PHENERGAN WITH CODEINE) 6 25-10 mg/5 mL syrup    COVID-19 - Primary     Patient tested positive for COVID on the 25th  Was given prednisone and Zithromax  Continues to symptoms, not feel well  Feels very fatigued, chills  Still meets the criteria Paxil with therapy  Medications sent  Phenergan with codeine given cough  Discussed management of symptoms  Patient advised to call office if no improvement  Note given for work           Relevant Medications    nirmatrelvir & ritonavir (Paxlovid, 300/100,) tablet therapy pack    promethazine-codeine (PHENERGAN WITH CODEINE) 6 25-10 mg/5 mL syrup               Reason for visit is   Chief Complaint   Patient presents with    Virtual Regular Visit        Encounter provider SKYE Manley    Provider located at [address] [address]      Recent Visits  Date Type Provider Dept   08/25/22 [facility]medicine [facility], [facility]   Showing recent visits within past 7 days and meeting all other requirements  Today's Visits  Date Type Provider Dept   08/29/22 Telemedicine Sebastien Galicia, [address] [place] [address] Primary Care   Showing today's visits and meeting all other requirements  Future Appointments  No visits were found meeting these conditions  Showing future appointments within next 150 days and meeting all other requirements       The patient was identified by name and date of birth  Carry Kawasaki was informed that this is a telemedicine visit and that the visit is being conducted through [facility] and patient was informed this is a secure, HIPAA-complaint platform   She agrees to proceed     My office door was closed  No one else was in the room  She acknowledged consent and understanding of privacy and security of the video platform  The patient has agreed to participate and understands they can discontinue the visit at any time  Patient is aware this is a billable service  Subjective  Zulema Gore is a 51 y o  female    Patient is being seen with complaints ongoing problems with COVID virus  tested positive on the   Patient reports that currently she does not have a fever but she has body aches, chills  Completed prednisone therapy and Zithromax therapy  Does productive cough  Past Medical History:   Diagnosis Date    Hypertension     Varicella        Past Surgical History:   Procedure Laterality Date     SECTION      x2        Current Outpatient Medications   Medication Sig Dispense Refill    nirmatrelvir & ritonavir (Paxlovid, 300/100,) tablet therapy pack Take 3 tablets by mouth 2 (two) times a day for 5 days Take 2 nirmatrelvir tablets + 1 ritonavir tablet together per dose 30 tablet 0    promethazine-codeine (PHENERGAN WITH CODEINE) 6 25-10 mg/5 mL syrup Take 5 mL by mouth every 4 (four) hours as needed for cough 118 mL 0    azithromycin (Zithromax) 250 mg tablet Take 2 tablets (500 mg total) by mouth daily for 1 day, THEN 1 tablet (250 mg total) daily for 4 days  6 tablet 0    benzonatate (TESSALON PERLES) 100 mg capsule Take 1 capsule (100 mg total) by mouth 3 (three) times a day as needed for cough 20 capsule 0    Diclofenac Sodium (VOLTAREN) 1 % Apply 2 g topically in the morning and 2 g at noon and 2 g in the evening and 2 g before bedtime   150 g 3    ibuprofen (MOTRIN) 800 mg tablet Take 800 mg by mouth every 6 to 8 hours if needed for pain      Iron-Vitamin C (Vitron-C)  MG TABS Take 1 tablet by mouth 2 (two) times a day 60 tablet 3    levothyroxine (Euthyrox) 50 mcg tablet Take 1 tablet (50 mcg total) by mouth daily in the early morning 90 tablet 3    meloxicam (Mobic) 15 mg tablet Take 1 tablet (15 mg total) by mouth daily (Patient not taking: Reported on 8/10/2022) 30 tablet 1    metFORMIN (GLUCOPHAGE-XR) 500 mg 24 hr tablet Take 1 tablet by mouth 2 (two) times a day      methocarbamol (ROBAXIN) 500 mg tablet Take 1 tablet (500 mg total) by mouth in the morning and 1 tablet (500 mg total) at noon and 1 tablet (500 mg total) in the evening and 1 tablet (500 mg total) before bedtime  30 tablet 0    pantoprazole (PROTONIX) 20 mg tablet Take 1 tablet (20 mg total) by mouth daily before breakfast (Patient not taking: Reported on 8/10/2022) 30 tablet 5    predniSONE 20 mg tablet Take 1 tablet (20 mg total) by mouth daily 5 tablet 0    sertraline (ZOLOFT) 50 mg tablet Take 1 tablet (50 mg total) by mouth daily 90 tablet 5     No current facility-administered medications for this visit  No Known Allergies    Review of Systems   Constitutional: Positive for chills and fatigue  HENT: Positive for sore throat  Eyes: Negative  Respiratory: Positive for cough  Negative for shortness of breath  Cardiovascular: Negative  Gastrointestinal: Negative  Endocrine: Negative  Genitourinary: Negative  Musculoskeletal: Negative  Allergic/Immunologic: Negative  Neurological: Negative  Psychiatric/Behavioral: Negative  Video Exam    Vitals:    08/29/22 0129   Temp: 99 2 °F (37 3 °C)       Physical Exam  Vitals and nursing note reviewed  Constitutional:       Appearance: She is well-developed  She is ill-appearing  HENT:      Head: Normocephalic and atraumatic  Pulmonary:      Effort: Pulmonary effort is normal    Musculoskeletal:         General: Normal range of motion  Cervical back: Normal range of motion  Skin:     General: Skin is dry  Neurological:      General: No focal deficit present  Mental Status: She is alert and oriented to person, place, and time            I spent 15 minutes directly with the patient during this visit

## 2022-08-29 NOTE — ASSESSMENT & PLAN NOTE
Patient tested positive for COVID on the 25th  Was given prednisone and Zithromax  Continues to symptoms, not feel well  Feels very fatigued, chills  Still meets the criteria Paxil with therapy  Medications sent  Phenergan with codeine given cough  Discussed management of symptoms  Patient advised to call office if no improvement    Note given for work

## 2022-09-01 ENCOUNTER — OFFICE VISIT (OUTPATIENT)
Dept: PHYSICAL THERAPY | Facility: CLINIC | Age: 47
End: 2022-09-01
Payer: COMMERCIAL

## 2022-09-01 DIAGNOSIS — S39.012D LUMBAR SPINE STRAIN, SUBSEQUENT ENCOUNTER: ICD-10-CM

## 2022-09-01 DIAGNOSIS — M51.36 DEGENERATIVE DISC DISEASE, LUMBAR: ICD-10-CM

## 2022-09-01 DIAGNOSIS — M53.3 SACROILIAC JOINT DYSFUNCTION: ICD-10-CM

## 2022-09-01 DIAGNOSIS — M47.816 LUMBAR FACET ARTHROPATHY: Primary | ICD-10-CM

## 2022-09-01 PROCEDURE — 97112 NEUROMUSCULAR REEDUCATION: CPT | Performed by: PHYSICAL THERAPIST

## 2022-09-01 PROCEDURE — 97110 THERAPEUTIC EXERCISES: CPT | Performed by: PHYSICAL THERAPIST

## 2022-09-01 PROCEDURE — 97530 THERAPEUTIC ACTIVITIES: CPT | Performed by: PHYSICAL THERAPIST

## 2022-09-01 PROCEDURE — 97140 MANUAL THERAPY 1/> REGIONS: CPT | Performed by: PHYSICAL THERAPIST

## 2022-09-01 NOTE — PROGRESS NOTES
Daily Note     Today's date: 2022  Patient name: Gerald Chisholm  : 1975  MRN: [id number]  Referring provider: Bogdan Schmitt DO  Dx:   Encounter Diagnosis     ICD-10-CM    1  Lumbar facet arthropathy  M47 816    2  Degenerative disc disease, lumbar  M51 36    3  Sacroiliac joint dysfunction  M53 3    4  Lumbar spine strain, subsequent encounter  S39 012D        Start Time: 5956  Stop Time: 1500  Total time in clinic (min): 45 minutes    Subjective: Pt reports having 2/10 pain in her low back today, however, she has been having increased pain in her R knee  Objective: See treatment diary below      Assessment: Tolerated treatment well  Patient demonstrated fatigue post treatment, exhibited good technique with therapeutic exercises and would benefit from continued PT  Pt was able to progress today with inclusion of SLR into pt's exercise program  Pt continues to demonstrate improved tolerance to all of her exercises today  Pt required min verbal cues to facilitate performance of proper technique  Assess pt response to treatment at next visit  Plan: Continue per plan of care        Precautions: N/A      Manuals  8       Piriformis stretch  1x5 20" ea 1x5  20" ea 1x5 20: ea 1x5 20: ea 1x5  20" ea       Hamstring stretch  1x5 20" ea 1x5  20" ea 1x5 20" ea 1x5 20: ea 1x5  20" ea                                 Neuro Re-Ed             Pt education 8' 5'           PPT  1x10 5" 1x15  5" 1x15 5" 1x15 5" 1x15  5"       bridges  2x10 3" 2x10 3" 2x10 3" 2x10 3" 3x10  3"       Prone hip ext  1x10 3" ea 2x10   3" ea   2x10       BKFO +TA   1x10 B           Marches +TA   Supine  2x10  2x10 2x10 2x10       SLR      2x10       Pullovers +TA   5# 10x  5# 10x 5# 10x 5#  10x       Ther Ex             LTR 2x10 2x10 2x10 5" 2x10 2x10 2x10       SKTC 1x5 10" 1x5 10" 1x10 B  10" 1x10 10" 1x10 10" 1x10  10"       3-way physioball rollout 1x10 1x10 3" 1x10   3" 1x10 3" 1x10 3" 1x10  3" Seated hamstring stretch 1x5 10"            Prone press up  1x10 time 1x10 1x10 1x10       Standing lumbar ext  1x10 3" time 1x10 1x10 1x10       Standing hip abd   NV?           Recumbent bike    6' 6' 6'       Ther Activity             STS from chair    2x10 2x10 2x10       Cuing for proper technique    3' 3' 3'       Gait Training                                       Modalities

## 2022-09-08 ENCOUNTER — APPOINTMENT (OUTPATIENT)
Dept: PHYSICAL THERAPY | Facility: CLINIC | Age: 47
End: 2022-09-08
Payer: COMMERCIAL

## 2022-09-11 ENCOUNTER — OFFICE VISIT (OUTPATIENT)
Dept: URGENT CARE | Facility: CLINIC | Age: 47
End: 2022-09-11
Payer: COMMERCIAL

## 2022-09-11 ENCOUNTER — APPOINTMENT (OUTPATIENT)
Dept: RADIOLOGY | Facility: CLINIC | Age: 47
End: 2022-09-11
Payer: COMMERCIAL

## 2022-09-11 VITALS
DIASTOLIC BLOOD PRESSURE: 105 MMHG | TEMPERATURE: 99 F | RESPIRATION RATE: 16 BRPM | HEART RATE: 73 BPM | WEIGHT: 228 LBS | OXYGEN SATURATION: 98 % | SYSTOLIC BLOOD PRESSURE: 144 MMHG | BODY MASS INDEX: 39.14 KG/M2

## 2022-09-11 DIAGNOSIS — M25.561 PAIN IN BOTH KNEES, UNSPECIFIED CHRONICITY: ICD-10-CM

## 2022-09-11 DIAGNOSIS — M25.562 PAIN IN BOTH KNEES, UNSPECIFIED CHRONICITY: ICD-10-CM

## 2022-09-11 DIAGNOSIS — M25.562 PAIN IN BOTH KNEES, UNSPECIFIED CHRONICITY: Primary | ICD-10-CM

## 2022-09-11 DIAGNOSIS — M25.561 PAIN IN BOTH KNEES, UNSPECIFIED CHRONICITY: Primary | ICD-10-CM

## 2022-09-11 PROCEDURE — 99213 OFFICE O/P EST LOW 20 MIN: CPT

## 2022-09-11 PROCEDURE — 99283 EMERGENCY DEPT VISIT LOW MDM: CPT

## 2022-09-11 PROCEDURE — G0382 LEV 3 HOSP TYPE B ED VISIT: HCPCS

## 2022-09-11 PROCEDURE — 73562 X-RAY EXAM OF KNEE 3: CPT

## 2022-09-11 NOTE — PATIENT INSTRUCTIONS
X-ray negative for acute fracture or abnormality on preliminary read  Await final radiology report for confirmation  1   Rest and elevation  2   Motrin/Tylenol as needed  Also recommend Voltaren gel as directed  3  F/u with Ortho Dr            Knee Pain   WHAT YOU NEED TO KNOW:   Knee pain may start suddenly, or it may be a long-term problem  You may have pain on the side, front, or back of your knee  You may have knee stiffness and swelling  You may hear popping sounds or feel like your knee is giving way or locking up as you walk  You may feel pain when you sit, stand, walk, or climb up and down stairs  Knee pain can be caused by conditions such as obesity, inflammation, or strains or tears in ligaments or tendons  DISCHARGE INSTRUCTIONS:   Return to the emergency department if:   Your pain is worse, even after treatment  You cannot bend or straighten your leg completely  The swelling around your knee does not go down even with treatment  Your knee is painful and hot to the touch  Contact your healthcare provider if:   You have questions or concerns about your condition or care  Medicines: You may need any of the following:  NSAIDs  help decrease swelling and pain or fever  This medicine is available with or without a doctor's order  NSAIDs can cause stomach bleeding or kidney problems in certain people  If you take blood thinner medicine, always ask your healthcare provider if NSAIDs are safe for you  Always read the medicine label and follow directions  Acetaminophen  decreases pain and fever  It is available without a doctor's order  Ask how much to take and how often to take it  Follow directions  Read the labels of all other medicines you are using to see if they also contain acetaminophen, or ask your doctor or pharmacist  Acetaminophen can cause liver damage if not taken correctly  Do not use more than 4 grams (4,000 milligrams) total of acetaminophen in one day  Prescription pain medicine  may be given  Ask your healthcare provider how to take this medicine safely  Some prescription pain medicines contain acetaminophen  Do not take other medicines that contain acetaminophen without talking to your healthcare provider  Too much acetaminophen may cause liver damage  Prescription pain medicine may cause constipation  Ask your healthcare provider how to prevent or treat constipation  Take your medicine as directed  Contact your healthcare provider if you think your medicine is not helping or if you have side effects  Tell him or her if you are allergic to any medicine  Keep a list of the medicines, vitamins, and herbs you take  Include the amounts, and when and why you take them  Bring the list or the pill bottles to follow-up visits  Carry your medicine list with you in case of an emergency  What you can do to manage your symptoms:   Rest your knee so it can heal   Limit activities that increase your pain  Do low-impact exercises, such as walking or swimming  Apply ice to help reduce swelling and pain  Use an ice pack, or put crushed ice in a plastic bag  Cover it with a towel before you apply it to your knee  Apply ice for 15 to 20 minutes every hour, or as directed  Apply compression to help reduce swelling  Use a brace or bandage only as directed  Elevate your knee to help decrease pain and swelling  Elevate your knee while you are sitting or lying down  Prop your leg on pillows to keep your knee above the level of your heart  Prevent your knee from moving as directed  Your healthcare provider may put on a cast or splint  You may need to wear a leg brace to stabilize your knee  A leg brace can be adjusted to increase your range of motion as your knee heals  What you can do to prevent knee pain:   Maintain a healthy weight  Extra weight increases your risk for knee pain  Ask your healthcare provider how much you should weigh   He or she can help you create a safe weight loss plan if you need to lose weight  Exercise or train properly  Use the correct equipment for sports  Wear shoes that provide good support  Check your posture often as you exercise, play sports, or train for an event  This can help prevent stress and strain on your knees  Rest between sessions so you do not overwork your knees  Follow up with your healthcare provider within 24 hours or as directed: You may need follow-up treatments, such as steroid injections to decrease pain  Write down your questions so you remember to ask them during your visits  © Copyright [employer] 2021 Information is for End User's use only and may not be sold, redistributed or otherwise used for commercial purposes  All illustrations and images included in CareNotes® are the copyrighted property of [employer]  or Laura Suh  The above information is an  only  It is not intended as medical advice for individual conditions or treatments  Talk to your doctor, nurse or pharmacist before following any medical regimen to see if it is safe and effective for you

## 2022-09-11 NOTE — PROGRESS NOTES
[facility]        NAME: Derian Barboza is a 51 y o  female  : 1975    MRN: [id number]  DATE: 2022  TIME: 3:47 PM    Assessment and Plan   Pain in both knees, unspecified chronicity [M25 561, M25 562]  1  Pain in both knees, unspecified chronicity  XR knee 3 vw left non injury    XR knee 3 vw right non injury         Patient Instructions     Patient Instructions     X-ray negative for acute fracture or abnormality on preliminary read  Await final radiology report for confirmation  1   Rest and elevation  2   Motrin/Tylenol as needed  Also recommend Voltaren gel as directed  3  F/u with Ortho Dr            Knee Pain   WHAT YOU NEED TO KNOW:   Knee pain may start suddenly, or it may be a long-term problem  You may have pain on the side, front, or back of your knee  You may have knee stiffness and swelling  You may hear popping sounds or feel like your knee is giving way or locking up as you walk  You may feel pain when you sit, stand, walk, or climb up and down stairs  Knee pain can be caused by conditions such as obesity, inflammation, or strains or tears in ligaments or tendons  DISCHARGE INSTRUCTIONS:   Return to the emergency department if:   · Your pain is worse, even after treatment  · You cannot bend or straighten your leg completely  · The swelling around your knee does not go down even with treatment  · Your knee is painful and hot to the touch  Contact your healthcare provider if:   · You have questions or concerns about your condition or care  Medicines: You may need any of the following:  · NSAIDs  help decrease swelling and pain or fever  This medicine is available with or without a doctor's order  NSAIDs can cause stomach bleeding or kidney problems in certain people  If you take blood thinner medicine, always ask your healthcare provider if NSAIDs are safe for you  Always read the medicine label and follow directions  · Acetaminophen  decreases pain and fever  It is available without a doctor's order  Ask how much to take and how often to take it  Follow directions  Read the labels of all other medicines you are using to see if they also contain acetaminophen, or ask your doctor or pharmacist  Acetaminophen can cause liver damage if not taken correctly  Do not use more than 4 grams (4,000 milligrams) total of acetaminophen in one day  · Prescription pain medicine  may be given  Ask your healthcare provider how to take this medicine safely  Some prescription pain medicines contain acetaminophen  Do not take other medicines that contain acetaminophen without talking to your healthcare provider  Too much acetaminophen may cause liver damage  Prescription pain medicine may cause constipation  Ask your healthcare provider how to prevent or treat constipation  · Take your medicine as directed  Contact your healthcare provider if you think your medicine is not helping or if you have side effects  Tell him or her if you are allergic to any medicine  Keep a list of the medicines, vitamins, and herbs you take  Include the amounts, and when and why you take them  Bring the list or the pill bottles to follow-up visits  Carry your medicine list with you in case of an emergency  What you can do to manage your symptoms:   · Rest your knee so it can heal   Limit activities that increase your pain  Do low-impact exercises, such as walking or swimming  · Apply ice to help reduce swelling and pain  Use an ice pack, or put crushed ice in a plastic bag  Cover it with a towel before you apply it to your knee  Apply ice for 15 to 20 minutes every hour, or as directed  · Apply compression to help reduce swelling  Use a brace or bandage only as directed  · Elevate your knee to help decrease pain and swelling  Elevate your knee while you are sitting or lying down   Prop your leg on pillows to keep your knee above the level of your heart  · Prevent your knee from moving as directed  Your healthcare provider may put on a cast or splint  You may need to wear a leg brace to stabilize your knee  A leg brace can be adjusted to increase your range of motion as your knee heals  What you can do to prevent knee pain:   · Maintain a healthy weight  Extra weight increases your risk for knee pain  Ask your healthcare provider how much you should weigh  He or she can help you create a safe weight loss plan if you need to lose weight  · Exercise or train properly  Use the correct equipment for sports  Wear shoes that provide good support  Check your posture often as you exercise, play sports, or train for an event  This can help prevent stress and strain on your knees  Rest between sessions so you do not overwork your knees  Follow up with your healthcare provider within 24 hours or as directed: You may need follow-up treatments, such as steroid injections to decrease pain  Write down your questions so you remember to ask them during your visits  © Copyright [employer] 2021 Information is for End User's use only and may not be sold, redistributed or otherwise used for commercial purposes  All illustrations and images included in CareNotes® are the copyrighted property of [employer] , Inc  or [facility]  The above information is an  only  It is not intended as medical advice for individual conditions or treatments  Talk to your doctor, nurse or pharmacist before following any medical regimen to see if it is safe and effective for you  Follow up with PCP in 3-5 days  Proceed to  ER if symptoms worsen  Chief Complaint     Chief Complaint   Patient presents with    Knee Pain     Reports bilat pain and swelling to knees  No injury  Reports receiving steroid injection in left knee in July with good results  It has since worn off and now with pain           History of Present Illness       Knee Pain   Incident onset: yesterday  The incident occurred at work  There was no injury mechanism (pain started after standing all day at work  )  The pain is present in the left knee and right knee (left worse than right)  Quality: sharp  The pain is at a severity of 9/10  The pain has been constant since onset  Pertinent negatives include no inability to bear weight, loss of motion, loss of sensation, numbness or tingling  The symptoms are aggravated by weight bearing and movement  She has tried acetaminophen and NSAIDs for the symptoms  Patient reports h/o knee injection of left knee a few months ago  Requesting X-rays and knee injection today  Explained we do not do knee injections, but can perform X-rays since she has never had any knee imaging done  Review of Systems   Review of Systems   Constitutional: Negative  Respiratory: Negative  Cardiovascular: Negative  Musculoskeletal: Positive for arthralgias  Negative for joint swelling  Skin: Negative for color change and rash  Neurological: Negative for tingling, weakness and numbness  Current Medications       Current Outpatient Medications:     metFORMIN (GLUCOPHAGE-XR) 500 mg 24 hr tablet, Take 1 tablet by mouth 2 (two) times a day, Disp: , Rfl:     sertraline (ZOLOFT) 50 mg tablet, Take 1 tablet (50 mg total) by mouth daily, Disp: 90 tablet, Rfl: 5    benzonatate (TESSALON PERLES) 100 mg capsule, Take 1 capsule (100 mg total) by mouth 3 (three) times a day as needed for cough (Patient not taking: Reported on 9/11/2022), Disp: 20 capsule, Rfl: 0    Diclofenac Sodium (VOLTAREN) 1 %, Apply 2 g topically in the morning and 2 g at noon and 2 g in the evening and 2 g before bedtime   (Patient not taking: Reported on 9/11/2022), Disp: 150 g, Rfl: 3    ibuprofen (MOTRIN) 800 mg tablet, Take 800 mg by mouth every 6 to 8 hours if needed for pain (Patient not taking: Reported on 9/11/2022), Disp: , Rfl:     Iron-Vitamin C (Vitron-C)  MG TABS, Take 1 tablet by mouth 2 (two) times a day (Patient not taking: Reported on 2022), Disp: 60 tablet, Rfl: 3    levothyroxine (Euthyrox) 50 mcg tablet, Take 1 tablet (50 mcg total) by mouth daily in the early morning (Patient not taking: Reported on 2022), Disp: 90 tablet, Rfl: 3    meloxicam (Mobic) 15 mg tablet, Take 1 tablet (15 mg total) by mouth daily (Patient not taking: No sig reported), Disp: 30 tablet, Rfl: 1    methocarbamol (ROBAXIN) 500 mg tablet, Take 1 tablet (500 mg total) by mouth in the morning and 1 tablet (500 mg total) at noon and 1 tablet (500 mg total) in the evening and 1 tablet (500 mg total) before bedtime   (Patient not taking: Reported on 2022), Disp: 30 tablet, Rfl: 0    pantoprazole (PROTONIX) 20 mg tablet, Take 1 tablet (20 mg total) by mouth daily before breakfast (Patient not taking: No sig reported), Disp: 30 tablet, Rfl: 5    predniSONE 20 mg tablet, Take 1 tablet (20 mg total) by mouth daily (Patient not taking: Reported on 2022), Disp: 5 tablet, Rfl: 0    promethazine-codeine (PHENERGAN WITH CODEINE) 6 25-10 mg/5 mL syrup, Take 5 mL by mouth every 4 (four) hours as needed for cough (Patient not taking: Reported on 2022), Disp: 118 mL, Rfl: 0    Current Allergies     Allergies as of 2022    (No Known Allergies)            The following portions of the patient's history were reviewed and updated as appropriate: allergies, current medications, past family history, past medical history, past social history, past surgical history and problem list      Past Medical History:   Diagnosis Date    GERD (gastroesophageal reflux disease)     Hypertension     Varicella        Past Surgical History:   Procedure Laterality Date     SECTION      x2        Family History   Problem Relation Age of Onset    Esophageal cancer Mother     Heart disease Father     No Known Problems Sister     No Known Problems Daughter     No Known Problems Maternal Grandmother     No Known Problems Maternal Grandfather     No Known Problems Paternal Grandmother     No Known Problems Paternal Grandfather     No Known Problems Sister     No Known Problems Brother     No Known Problems Brother     No Known Problems Son     No Known Problems Maternal Aunt     No Known Problems Paternal Aunt     No Known Problems Paternal Aunt     No Known Problems Paternal Aunt     No Known Problems Paternal Aunt     No Known Problems Paternal Aunt          Medications have been verified  Objective   BP (!) 144/105   Pulse 73   Temp 99 °F (37 2 °C) (Tympanic)   Resp 16   Wt 103 kg (228 lb)   LMP 08/25/2022 (Exact Date)   SpO2 98%   BMI 39 14 kg/m²        Physical Exam     Physical Exam  Vitals and nursing note reviewed  Constitutional:       General: She is not in acute distress  Appearance: Normal appearance  Comments: Patient sitting in wheelchair  In NAD  Cardiovascular:      Rate and Rhythm: Normal rate and regular rhythm  Heart sounds: Normal heart sounds  Pulmonary:      Effort: Pulmonary effort is normal       Breath sounds: Normal breath sounds  Musculoskeletal:      Right knee: No swelling, deformity, erythema, ecchymosis, bony tenderness or crepitus  Tenderness present over the medial joint line  No LCL laxity or MCL laxity  Normal patellar mobility  Normal pulse  Left knee: No swelling, deformity, erythema, ecchymosis, bony tenderness or crepitus  Tenderness present over the medial joint line  No LCL laxity or MCL laxity  Normal patellar mobility  Normal pulse  Psychiatric:         Behavior: Behavior normal  Behavior is cooperative

## 2022-09-13 ENCOUNTER — OFFICE VISIT (OUTPATIENT)
Dept: PHYSICAL THERAPY | Facility: CLINIC | Age: 47
End: 2022-09-13
Payer: COMMERCIAL

## 2022-09-13 DIAGNOSIS — M62.9 HAMSTRING TIGHTNESS OF BOTH LOWER EXTREMITIES: ICD-10-CM

## 2022-09-13 DIAGNOSIS — R29.898 WEAKNESS OF BOTH HIPS: ICD-10-CM

## 2022-09-13 DIAGNOSIS — M47.816 LUMBAR FACET ARTHROPATHY: Primary | ICD-10-CM

## 2022-09-13 DIAGNOSIS — M51.36 DEGENERATIVE DISC DISEASE, LUMBAR: ICD-10-CM

## 2022-09-13 DIAGNOSIS — M53.3 SACROILIAC JOINT DYSFUNCTION: ICD-10-CM

## 2022-09-13 DIAGNOSIS — R19.8 ABDOMINAL WEAKNESS: ICD-10-CM

## 2022-09-13 DIAGNOSIS — M17.12 PRIMARY OSTEOARTHRITIS OF LEFT KNEE: ICD-10-CM

## 2022-09-13 DIAGNOSIS — S39.012D LUMBAR SPINE STRAIN, SUBSEQUENT ENCOUNTER: ICD-10-CM

## 2022-09-13 PROCEDURE — 97112 NEUROMUSCULAR REEDUCATION: CPT

## 2022-09-13 PROCEDURE — 97110 THERAPEUTIC EXERCISES: CPT

## 2022-09-13 PROCEDURE — 97140 MANUAL THERAPY 1/> REGIONS: CPT

## 2022-09-13 NOTE — PROGRESS NOTES
Daily Note     Today's date: 2022  Patient name: Brooklynn Griffin  : 1975  MRN: [id number]  Referring provider: Patricio Lam DO  Dx:   Encounter Diagnosis     ICD-10-CM    1  Lumbar facet arthropathy  M47 816    2  Degenerative disc disease, lumbar  M51 36    3  Sacroiliac joint dysfunction  M53 3    4  Lumbar spine strain, subsequent encounter  S39 012D    5  Abdominal weakness  R19 8    6  Weakness of both hips  R29 898    7  Hamstring tightness of both lower extremities  M62 9    8  Primary osteoarthritis of left knee  M17 12                   Subjective: Pt reports her back has been doing better since starting physical therapy and performing her HEP  Notes her L knee was really starting to bother this past Saturday, while at work; was standing all day, which seemed to be likely cause for aggravation of symptoms  L knee pain is better than over the weekend but still very painful  Objective: See treatment diary below      Assessment: Tolerated treatment fair  Slight tingling noted from L knee to foot during manual HS stretch, but did resolve by end of treatment with additional exercise and rest   Slight pain in L knee during bridges, and additional reps held; STS held today d/t increased pain reported at start of treatment  Patient would benefit from continued PT   Plan: Continue per plan of care        Precautions: N/A      Manuals  8      Piriformis stretch  1x5 20" ea 1x5  20" ea 1x5 20: ea 1x5 20: ea 1x5  20" ea 1x5  20" ea      Hamstring stretch  1x5 20" ea 1x5  20" ea 1x5 20" ea 1x5 20: ea 1x5  20" ea 1x5  20" ea                                Neuro Re-Ed             Pt education 8' 5'           PPT  1x10 5" 1x15  5" 1x15 5" 1x15 5" 1x15  5" 1x15  5"      bridges  2x10 3" 2x10 3" 2x10 3" 2x10 3" 3x10  3" 3x10  3"      Prone hip ext  1x10 3" ea 2x10   3" ea   2x10 nv      BKFO +TA   1x10 B           Marches +TA   Supine  2x10  2x10 2x10 2x10 2x10      SLR      2x10 2x10      Pullovers +TA   5# 10x  5# 10x 5# 10x 5#  10x 5#  10x      Ther Ex             LTR 2x10 2x10 2x10 5" 2x10 2x10 2x10 2x10      SKTC 1x5 10" 1x5 10" 1x10 B  10" 1x10 10" 1x10 10" 1x10  10" 1x10  10"      3-way physioball rollout 1x10 1x10 3" 1x10   3" 1x10 3" 1x10 3" 1x10  3" 1x10  3"      Seated hamstring stretch 1x5 10"            Prone press up  1x10 time 1x10 1x10 1x10 1x10      Standing lumbar ext  1x10 3" time 1x10 1x10 1x10       Standing hip abd   NV?           Recumbent bike    6' 6' 6' 6'      Ther Activity             STS from chair    2x10 2x10 2x10 nv      Cuing for proper technique    3' 3' 3'       Gait Training                                       Modalities

## 2022-09-13 NOTE — PROGRESS NOTES
PT Re-Evaluation     Today's date: 2022  Patient name: Mary Wood  : 1975  MRN: [id number]  Referring provider: Garrett Trent DO  Dx:   Encounter Diagnosis     ICD-10-CM    1  Lumbar facet arthropathy  M47 816    2  Degenerative disc disease, lumbar  M51 36    3  Sacroiliac joint dysfunction  M53 3    4  Lumbar spine strain, subsequent encounter  S39 012D    5  Abdominal weakness  R19 8    6  Weakness of both hips  R29 898    7  Hamstring tightness of both lower extremities  M62 9    8  Primary osteoarthritis of left knee  M17 12        Start Time: 1506  Stop Time: 1552  Total time in clinic (min): 46 minutes    Assessment  Assessment details: Upon re-examination, pt has improved strength and ROM of her lumbar spine and LEs as well as decreased pain of the same regions  Pt's FOTO score has also improved, demonstrating an improved ability to perform functional activities  However, pt continues to have strength and ROM deficits of her lumbar spine and LEs as well as pain and difficulty with prolonged standing, walking, and bending  Pt plan of care will focus on the previously mentioned deficits and limitations  Pt would continue to benefit from skilled physical therapy to facilitate a return to pt's prior level of function  Impairments: abnormal or restricted ROM, activity intolerance, impaired physical strength, lacks appropriate home exercise program, pain with function, poor posture  and poor body mechanics  Functional limitations: self-care/ADLs, household/work activities, and lifting objectsUnderstanding of Dx/Px/POC: good   Prognosis: good    Goals  STG - 4 weeks  1  Pt will demonstrate WNL AROM of her lumbar spine in all planes to facilitate a return to her prior level of function  Ongoing  2  Pt will have a decrease in pain levels by 2 or better during performance of her functional activities  MET    LTG - 8 weeks  1   Pt will demonstrate 4/5 gross strength or better of her lumbar spine and B hips in all planes to facilitate a return to her prior level of function  Ongoing  2  Pt's FOTO score will improve from 44 to 59 or better to facilitate a return to pt's prior level of function  MET      Plan  Patient would benefit from: PT eval and skilled physical therapy  Planned modality interventions: cryotherapy, thermotherapy: hydrocollator packs and unattended electrical stimulation  Planned therapy interventions: abdominal trunk stabilization, activity modification, ADL training, body mechanics training, flexibility, functional ROM exercises, graded exercise, home exercise program, gait training, joint mobilization, manual therapy, massage, Ford taping, neuromuscular re-education, patient education, self care, strengthening, stretching, postural training, therapeutic activities and therapeutic exercise  Frequency: 1x week  Duration in weeks: 8  Plan of Care beginning date: 2022  Plan of Care expiration date: 2022  Treatment plan discussed with: patient        Subjective Evaluation    History of Present Illness  Date of onset: 2022  Mechanism of injury: Pt is 50 y/o female presenting to physical therapy with B lumbar pain c L radicular symptoms  Pt reports she has decreased intensity and frequency of her low back pain as well as using less pain medication  Pt reports having improved standing and activity tolerance with her household chores  Pt reports she also has less stiffness and improved motion of her low back  However, pt reports she continues to have increased pain and tightness in her low back with bending, lifting, and prolonged walking  Pt reports feeling 75% of her prior level of function      Pain  Current pain rating: 3  At best pain ratin  At worst pain ratin  Location: lumbar  Quality: sharp  Relieving factors: medications  Aggravating factors: standing and lifting    Treatments  Previous treatment: medication  Current treatment: medication and physical therapy  Patient Goals  Patient goals for therapy: decreased pain  Patient goal: "learn exercises to get the most relief"        Objective     Postural Observations  Seated posture: fair  Standing posture: fair  Correction of posture: has no consistent effect        Palpation   Left   Tenderness of the erector spinae and lumbar paraspinals  Right   Tenderness of the erector spinae and lumbar paraspinals  Tenderness     Lumbar Spine  Tenderness in the spinous process  Additional Tenderness Details  Significant tenderness to palpation of pt's B lumbar spine    Active Range of Motion     Lumbar   Flexion: 50 degrees  Restriction level: moderate  Extension: 15 degrees  Restriction level: moderate  Left lateral flexion: 20 degrees    Restriction level: minimal  Right lateral flexion: 20 degrees  Restriction level: minimal    Strength/Myotome Testing     Left Hip   Planes of Motion   Flexion: 3+  Extension: 4-  Abduction: 4-  Adduction: 4-    Right Hip   Planes of Motion   Flexion: 4-  Extension: 4-  Abduction: 4-  Adduction: 4-    Left Knee   Flexion: 4-  Extension: 4-    Right Knee   Flexion: 4  Extension: 4    Tests     Lumbar     Left   Positive passive SLR  Right   Positive passive SLR       General Comments:      Lumbar Comments  Lumbar flexion gross strength: 4-/5  Lumbar extension gross strength: 4-/5  Lumbar R lateral flexion gross strength: 4-/5  Lumbar L lateral flexion gross strength: 4-/5    Significant tightness of pt's lumbar paraspinals, B hamstring, and B piriformis

## 2022-09-19 NOTE — TELEPHONE ENCOUNTER
Called patient off the wait list, Patient would like [place] office, inform patient to contact insurance company to see if there [address] coverage on plan, I reached out to billing department as well  Patient will call back

## 2022-09-21 NOTE — TELEPHONE ENCOUNTER
Called Patient off the wait list in regards to scheduling, lvm for patient to contact intake department

## 2022-09-22 NOTE — TELEPHONE ENCOUNTER
Behavorial Health Outpatient Intake Questions    Referred by:PCP    Please advised interviewee that they need to answer all questions truthfully to allow for best care and any misrepresentations of information may affect their ability to be seen at this clinic   => Was this discussed? Yes     Behavorial Health Outpatient Intake History -     Presenting Problem (in patient's words):   Depression, anxiety    Are there any developmental disabilities? ? If yes, can they speak to you on the phone? If they are too limited to speak to you on phone, refer out No    Are you taking any psychiatric medications? Yes    => If yes, who prescribes? If yes, are they injectable medications?   zoloft    Does the patient have a language barrier or hearing impairment? Yes  [place] court [place]    Have you been treated at [facility] by a therapist or a doctor in the past? If yes, who? No    Has the patient been hospitalized for mental health? No   If yes, how long ago was last hospitalization and where was it? Do you actively use alcohol or marijuana or illegal substances? If yes, what and how much - refer out to Drug and alcohol treatment if use is excessive or daily use of illegal substances No concerns of substance abuse are reported  Do you have a community treatment team or ? No    Legal History-     Does the patient have any history of arrests, care home/group home time, or DUIs? No  If Yes-  1) What types of charges? 2) When were they last incarcerated? 3) Are they currently on parole or probation? Minor Child-    Who has custody of the child? Is there a custody agreement? If there is a custody agreement remind parent that they must bring a copy to the first appt or they will not be seen       Intake Team, please check with provider before scheduling if flags come up such as:  - complex case  - legal history (other than DUI)  - communication barrier concerns are present  - if, in your judgment, this needs further review    ACCEPTED as a patient Yes  => Appointment Date: 10/3/22 at 4pm with ezequiel Alberts    Referred Elsewhere? No    Name of Insurance Co:[facility]  Insurance DM#[id number]  Insurance Phone #  If ins is primary or secondary:Primary  If patient is a minor, parents information such as Name, D  O B of guarantor

## 2022-09-27 ENCOUNTER — OFFICE VISIT (OUTPATIENT)
Dept: URGENT CARE | Facility: CLINIC | Age: 47
End: 2022-09-27
Payer: COMMERCIAL

## 2022-09-27 VITALS
WEIGHT: 233 LBS | OXYGEN SATURATION: 100 % | TEMPERATURE: 98 F | HEIGHT: 64 IN | BODY MASS INDEX: 39.78 KG/M2 | RESPIRATION RATE: 16 BRPM | HEART RATE: 80 BPM | SYSTOLIC BLOOD PRESSURE: 168 MMHG | DIASTOLIC BLOOD PRESSURE: 110 MMHG

## 2022-09-27 DIAGNOSIS — J01.00 ACUTE NON-RECURRENT MAXILLARY SINUSITIS: Primary | ICD-10-CM

## 2022-09-27 DIAGNOSIS — I10 UNCONTROLLED HYPERTENSION: ICD-10-CM

## 2022-09-27 PROCEDURE — G0382 LEV 3 HOSP TYPE B ED VISIT: HCPCS | Performed by: EMERGENCY MEDICINE

## 2022-09-27 PROCEDURE — 99213 OFFICE O/P EST LOW 20 MIN: CPT | Performed by: EMERGENCY MEDICINE

## 2022-09-27 RX ORDER — AMOXICILLIN 250 MG/1
500 CAPSULE ORAL ONCE
Status: COMPLETED | OUTPATIENT
Start: 2022-09-27 | End: 2022-09-27

## 2022-09-27 RX ORDER — AMOXICILLIN 500 MG/1
500 CAPSULE ORAL 3 TIMES DAILY
Qty: 30 CAPSULE | Refills: 0 | Status: SHIPPED | OUTPATIENT
Start: 2022-09-27 | End: 2022-10-07

## 2022-09-27 RX ORDER — AMOXICILLIN 250 MG/1
500 CAPSULE ORAL EVERY 8 HOURS SCHEDULED
Status: DISCONTINUED | OUTPATIENT
Start: 2022-09-27 | End: 2022-09-27

## 2022-09-27 RX ADMIN — AMOXICILLIN 500 MG: 250 CAPSULE ORAL at 20:08

## 2022-09-27 NOTE — PROGRESS NOTES
[facility]        NAME: Kt Davis is a 51 y o  female  : 1975    MRN: [id number]  DATE: 2022  TIME: 11:31 PM    Assessment and Plan   Acute non-recurrent maxillary sinusitis [J01 00]  1  Acute non-recurrent maxillary sinusitis  amoxicillin (AMOXIL) 500 mg capsule    amoxicillin (AMOXIL) capsule 500 mg    DISCONTINUED: amoxicillin (AMOXIL) capsule 500 mg   2  Uncontrolled hypertension       Pt  Was offered an ambulance to go to the ER, but pt  Declined  Patient Instructions   Patient Instructions   Your Blood pressure is very high - you should stop your pseudofed and go to the ER now  Take Amoxicillin x 1 week  Tylenol for fever  F/u with PCP in 1-2 days  Take Delsym over the counter        Follow up with PCP in 3-5 days  Proceed to  ER if symptoms worsen  Chief Complaint     Chief Complaint   Patient presents with    Cold Like Symptoms     Allergies acting up,  Runny nose Sinus pain  2 days Raising her BP  Sudafed         History of Present Illness       50 yo female with cc cough and congestion over the past 2 days  Pt  States she has allergies, and has been taking pseudophed  Pt  Also is hyertensive  Review of Systems   Review of Systems   Constitutional: Negative for chills and fever  HENT: Positive for congestion, sinus pressure, sinus pain and sore throat  Negative for rhinorrhea  Eyes: Negative for discharge and visual disturbance  Respiratory: Positive for cough  Negative for shortness of breath and wheezing  Cardiovascular: Negative for chest pain and palpitations  Gastrointestinal: Negative for abdominal pain and vomiting  Endocrine: Negative for polydipsia and polyuria  Genitourinary: Negative for dysuria and hematuria  Musculoskeletal: Negative for arthralgias, gait problem and neck stiffness  Skin: Negative for rash and wound  Neurological: Negative for dizziness and headaches     Psychiatric/Behavioral: Negative for confusion and suicidal ideas  Current Medications       Current Outpatient Medications:     amoxicillin (AMOXIL) 500 mg capsule, Take 1 capsule (500 mg total) by mouth 3 (three) times a day for 10 days, Disp: 30 capsule, Rfl: 0    Iron-Vitamin C (Vitron-C)  MG TABS, Take 1 tablet by mouth 2 (two) times a day, Disp: 60 tablet, Rfl: 3    metFORMIN (GLUCOPHAGE-XR) 500 mg 24 hr tablet, Take 1 tablet by mouth 2 (two) times a day, Disp: , Rfl:     sertraline (ZOLOFT) 50 mg tablet, Take 1 tablet (50 mg total) by mouth daily, Disp: 90 tablet, Rfl: 5    Diclofenac Sodium (VOLTAREN) 1 %, Apply 2 g topically in the morning and 2 g at noon and 2 g in the evening and 2 g before bedtime  (Patient not taking: No sig reported), Disp: 150 g, Rfl: 3    levothyroxine (Euthyrox) 50 mcg tablet, Take 1 tablet (50 mcg total) by mouth daily in the early morning (Patient not taking: No sig reported), Disp: 90 tablet, Rfl: 3    meloxicam (Mobic) 15 mg tablet, Take 1 tablet (15 mg total) by mouth daily (Patient not taking: No sig reported), Disp: 30 tablet, Rfl: 1    methocarbamol (ROBAXIN) 500 mg tablet, Take 1 tablet (500 mg total) by mouth in the morning and 1 tablet (500 mg total) at noon and 1 tablet (500 mg total) in the evening and 1 tablet (500 mg total) before bedtime  (Patient not taking: No sig reported), Disp: 30 tablet, Rfl: 0    pantoprazole (PROTONIX) 20 mg tablet, Take 1 tablet (20 mg total) by mouth daily before breakfast (Patient not taking: No sig reported), Disp: 30 tablet, Rfl: 5  No current facility-administered medications for this visit      Current Allergies     Allergies as of 09/27/2022    (No Known Allergies)            The following portions of the patient's history were reviewed and updated as appropriate: allergies, current medications, past family history, past medical history, past social history, past surgical history and problem list      Past Medical History:   Diagnosis Date    Allergic     GERD (gastroesophageal reflux disease)     Hypertension     Varicella        Past Surgical History:   Procedure Laterality Date     SECTION      x2        Family History   Problem Relation Age of Onset    Esophageal cancer Mother     Heart disease Father     No Known Problems Sister     No Known Problems Daughter     No Known Problems Maternal Grandmother     No Known Problems Maternal Grandfather     No Known Problems Paternal Grandmother     No Known Problems Paternal Grandfather     No Known Problems Sister     No Known Problems Brother     No Known Problems Brother     No Known Problems Son     No Known Problems Maternal Aunt     No Known Problems Paternal Aunt     No Known Problems Paternal Aunt     No Known Problems Paternal Aunt     No Known Problems Paternal Aunt     No Known Problems Paternal Aunt          Medications have been verified  Objective   BP (!) 168/110   Pulse 80   Temp 98 °F (36 7 °C)   Resp 16   Ht 5' 4" (1 626 m)   Wt 106 kg (233 lb)   LMP  (LMP Unknown)   SpO2 100%   BMI 39 99 kg/m²        Physical Exam     Physical Exam  Vitals reviewed  Constitutional:       General: She is not in acute distress  Appearance: She is well-developed  She is not ill-appearing, toxic-appearing or diaphoretic  HENT:      Head: Normocephalic and atraumatic  Nose: Congestion present  Mouth/Throat:      Pharynx: Oropharynx is clear  Eyes:      General: No scleral icterus  Extraocular Movements: Extraocular movements intact  Pupils: Pupils are equal, round, and reactive to light  Cardiovascular:      Rate and Rhythm: Normal rate  Heart sounds: Normal heart sounds  Pulmonary:      Effort: Pulmonary effort is normal  No respiratory distress  Breath sounds: Normal breath sounds  No stridor  No wheezing, rhonchi or rales  Comments: + cough  Chest:      Chest wall: No tenderness  Abdominal:      General: Abdomen is flat   Bowel sounds are normal  There is no distension  Palpations: Abdomen is soft  There is no shifting dullness, hepatomegaly, splenomegaly or mass  Tenderness: There is no abdominal tenderness  There is no right CVA tenderness, left CVA tenderness or guarding  Negative signs include Killian's sign and McBurney's sign  Hernia: No hernia is present  Skin:     General: Skin is warm and dry  Coloration: Skin is not cyanotic, jaundiced, mottled or pale  Findings: No erythema  Neurological:      General: No focal deficit present  Mental Status: She is alert and oriented to person, place, and time     Psychiatric:         Mood and Affect: Mood normal

## 2022-09-27 NOTE — PATIENT INSTRUCTIONS
Your Blood pressure is very high - you should stop your pseudofed and go to the ER now  Take Amoxicillin x 1 week  Tylenol for fever  F/u with PCP in 1-2 days  Take Delsym over the counter

## 2022-09-27 NOTE — LETTER
September 27, 2022     Patient: Leslie Thomas   YOB: 1975   Date of Visit: 9/27/2022       To Whom It May Concern: It is my medical opinion that Leslie Thomas should remain out of work until 10/03/2022  If you have any questions or concerns, please don't hesitate to call           Sincerely,        Margarita Query, DO    CC: No Recipients

## 2022-09-29 ENCOUNTER — OFFICE VISIT (OUTPATIENT)
Dept: PHYSICAL THERAPY | Facility: CLINIC | Age: 47
End: 2022-09-29
Payer: COMMERCIAL

## 2022-09-29 DIAGNOSIS — M47.816 LUMBAR FACET ARTHROPATHY: Primary | ICD-10-CM

## 2022-09-29 DIAGNOSIS — M51.36 DEGENERATIVE DISC DISEASE, LUMBAR: ICD-10-CM

## 2022-09-29 DIAGNOSIS — M53.3 SACROILIAC JOINT DYSFUNCTION: ICD-10-CM

## 2022-09-29 PROCEDURE — 97112 NEUROMUSCULAR REEDUCATION: CPT | Performed by: PHYSICAL THERAPIST

## 2022-09-29 PROCEDURE — 97110 THERAPEUTIC EXERCISES: CPT | Performed by: PHYSICAL THERAPIST

## 2022-09-29 PROCEDURE — 97140 MANUAL THERAPY 1/> REGIONS: CPT | Performed by: PHYSICAL THERAPIST

## 2022-09-29 NOTE — PROGRESS NOTES
Daily Note     Today's date: 2022  Patient name: Tracy Olmedo  : 1975  MRN: [id number]  Referring provider: Vipul Krause DO  Dx:   Encounter Diagnosis     ICD-10-CM    1  Lumbar facet arthropathy  M47 816    2  Degenerative disc disease, lumbar  M51 36    3  Sacroiliac joint dysfunction  M53 3        Start Time: 1630  Stop Time: 7672  Total time in clinic (min): 45 minutes    Subjective: Patient reports some improvement to low back      Objective: See treatment diary below      Assessment: Tolerated treatment fair  Patient demonstrated fatigue post treatment, exhibited good technique with therapeutic exercises and would benefit from continued PT, patient reports increased pain with flexion and relief with extension and also notes pain with prolonged standing      Plan: Progress treatment as tolerated          Precautions: N/A      Manuals      Piriformis stretch  1x5 20" ea 1x5  20" ea 1x5 20: ea 1x5 20: ea 1x5  20" ea 1x5  20" ea 1x5     Hamstring stretch  1x5 20" ea 1x5  20" ea 1x5 20" ea 1x5 20: ea 1x5  20" ea 1x5  20" ea 1x5                               Neuro Re-Ed             Pt education 8' 5'           PPT  1x10 5" 1x15  5" 1x15 5" 1x15 5" 1x15  5" 1x15  5" 1x15     bridges  2x10 3" 2x10 3" 2x10 3" 2x10 3" 3x10  3" 3x10  3" 3x10     Prone hip ext  1x10 3" ea 2x10   3" ea   2x10 nv 2x10     BKFO +TA   1x10 B           Marches +TA   Supine  2x10  2x10 2x10 2x10 2x10 2x10     SLR      2x10 2x10 2x10     Pullovers +TA   5# 10x  5# 10x 5# 10x 5#  10x 5#  10x 5#  10x     Ther Ex             LTR 2x10 2x10 2x10 5" 2x10 2x10 2x10 2x10 2x10     SKTC 1x5 10" 1x5 10" 1x10 B  10" 1x10 10" 1x10 10" 1x10  10" 1x10  10" 1x10     3-way physioball rollout 1x10 1x10 3" 1x10   3" 1x10 3" 1x10 3" 1x10  3" 1x10  3" 1x10     Seated hamstring stretch 1x5 10"            Prone press up  1x10 time 1x10 1x10 1x10 1x10 1x10     Standing lumbar ext  1x10 3" time 1x10 1x10 1x10 1x10     Standing hip abd   NV?           Recumbent bike    6' 6' 6' 6' 6'     Ther Activity             STS from chair    2x10 2x10 2x10 nv      Cuing for proper technique    3' 3' 3'       Gait Training                                       Modalities

## 2022-10-03 ENCOUNTER — SOCIAL WORK (OUTPATIENT)
Dept: BEHAVIORAL/MENTAL HEALTH CLINIC | Facility: CLINIC | Age: 47
End: 2022-10-03
Payer: COMMERCIAL

## 2022-10-03 DIAGNOSIS — F43.23 ADJUSTMENT DISORDER WITH MIXED ANXIETY AND DEPRESSED MOOD: Primary | ICD-10-CM

## 2022-10-03 PROCEDURE — 90791 PSYCH DIAGNOSTIC EVALUATION: CPT | Performed by: SOCIAL WORKER
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
## 2022-10-05 PROBLEM — F43.23 ADJUSTMENT DISORDER WITH MIXED ANXIETY AND DEPRESSED MOOD: Status: ACTIVE | Noted: 2022-10-05

## 2022-10-05 NOTE — PSYCH
Treatment Plan not completed within required time limits due to: Took full session to obtain hx to complete therapist initial evaluation  Will complete tx plan at next session

## 2022-10-06 ENCOUNTER — APPOINTMENT (OUTPATIENT)
Dept: PHYSICAL THERAPY | Facility: CLINIC | Age: 47
End: 2022-10-06

## 2022-10-11 ENCOUNTER — TELEMEDICINE (OUTPATIENT)
Dept: BEHAVIORAL/MENTAL HEALTH CLINIC | Facility: CLINIC | Age: 47
End: 2022-10-11
Payer: COMMERCIAL

## 2022-10-11 ENCOUNTER — OFFICE VISIT (OUTPATIENT)
Dept: PHYSICAL THERAPY | Facility: CLINIC | Age: 47
End: 2022-10-11
Payer: COMMERCIAL

## 2022-10-11 DIAGNOSIS — R29.898 WEAKNESS OF BOTH HIPS: ICD-10-CM

## 2022-10-11 DIAGNOSIS — M62.9 HAMSTRING TIGHTNESS OF BOTH LOWER EXTREMITIES: ICD-10-CM

## 2022-10-11 DIAGNOSIS — F43.23 ADJUSTMENT DISORDER WITH MIXED ANXIETY AND DEPRESSED MOOD: Primary | ICD-10-CM

## 2022-10-11 DIAGNOSIS — M17.12 PRIMARY OSTEOARTHRITIS OF LEFT KNEE: ICD-10-CM

## 2022-10-11 DIAGNOSIS — M47.816 LUMBAR FACET ARTHROPATHY: Primary | ICD-10-CM

## 2022-10-11 DIAGNOSIS — M51.36 DEGENERATIVE DISC DISEASE, LUMBAR: ICD-10-CM

## 2022-10-11 DIAGNOSIS — M53.3 SACROILIAC JOINT DYSFUNCTION: ICD-10-CM

## 2022-10-11 DIAGNOSIS — R19.8 ABDOMINAL WEAKNESS: ICD-10-CM

## 2022-10-11 DIAGNOSIS — S39.012D LUMBAR SPINE STRAIN, SUBSEQUENT ENCOUNTER: ICD-10-CM

## 2022-10-11 PROCEDURE — 90834 PSYTX W PT 45 MINUTES: CPT | Performed by: SOCIAL WORKER

## 2022-10-11 PROCEDURE — 97112 NEUROMUSCULAR REEDUCATION: CPT | Performed by: PHYSICAL THERAPIST

## 2022-10-11 PROCEDURE — 97110 THERAPEUTIC EXERCISES: CPT | Performed by: PHYSICAL THERAPIST

## 2022-10-11 PROCEDURE — 97140 MANUAL THERAPY 1/> REGIONS: CPT | Performed by: PHYSICAL THERAPIST

## 2022-10-11 PROCEDURE — 97530 THERAPEUTIC ACTIVITIES: CPT | Performed by: PHYSICAL THERAPIST

## 2022-10-11 NOTE — PSYCH
This note was not shared with the patient due to this is a psychotherapy note      Virtual Regular Visit    Verification of patient location:    Patient is located in the following state in which I hold an active license [place]      Assessment/Plan:    Problem List Items Addressed This Visit        Other    Adjustment disorder with mixed anxiety and depressed mood - Primary          Goals addressed in session: Goal 1          Reason for visit is   Chief Complaint   Patient presents with   • Virtual Regular Visit        Encounter provider [facility]    Provider located at [address] [place] [id number] [phone]     The patient was identified by name and date of birth  Oseas Gant was informed that this is a telemedicine visit and that the visit is being conducted through[employer]ry [employer] Embedded and patient was informed this is a secure, HIPAA-complaint platform  She agrees to proceed     My office door was closed  No one else was in the room  She acknowledged consent and understanding of privacy and security of the video platform  The patient has agreed to participate and understands they can discontinue the visit at any time  Patient is aware this is a billable service  Subjective  Oseas Gant is a 51 y o  female   Psychotherapy Provided: Individual Psychotherapy 44 minutes     Length of time in session: 44 minutes, follow up in 1 week    Goals addressed in session: Goal 1     Pain:      none    0    Current suicide risk : Low     Met with Jessy  Topics we discussed today include: her employment, her relationship with her , and winter approaching  Dianne Barajas is finding 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 and believes she will start looking for alternative work in the new year  We did discuss how these types of shifts can effect her emotional wellness    She expressed some worry and anxiety about winter approaching, sharing it was a difficult time for her last year as she adjusted to grant  She feels the shorter days have already begun effecting her emotionally  Validation of her feelings and support was provided  She's been talking to her daughter for support who encouraged her to plan a trip over the winter  We also discussed planning with pleasurable activities she can do at home during the winter months; mostly focused on her interest in crafts and baking  Her 's been staying/working in [employer] during the week and they are only seeing eachother during the weekend  She feels this alone time is allowing her to focus on herself right now  She told the story of some of their marital conflict; including her  having difficulty understanding and supporting Janet Ardon when she struggled adjusting to the [place] culture last year when she moved  Per Janet Ardon, her  tells her she needs to 'deal with all this first, not fair to him ' Janet Ardon expressed how this all makes her feel  She expresses feelings of guilt, saying she feels like his enemy  Support therapy was provided  CBT was utilized at appropriate to dismantle unhelpful thoughts as they arose, encouraging her to be fair to herself, validate her own feelings  Janet Ardon will continue weekly support at this time  Behavioral Health Treatment Plan [facility]: Diagnosis and Treatment Plan explained to Yun Hollis relates understanding diagnosis and is agreeable to Treatment Plan   Yes     I spent 44 minutes directly with the patient during this visit     EXACT TIME IN: 1301  EXACT TIME OUT: [address] [facility] IN SESSION: 44

## 2022-10-11 NOTE — PROGRESS NOTES
Daily Note     Today's date: 10/11/2022  Patient name: Wiley Gil  : 1975  MRN: [id number]  Referring provider: Amina Tolilver DO  Dx:   Encounter Diagnosis     ICD-10-CM    1  Lumbar facet arthropathy  M47 816    2  Degenerative disc disease, lumbar  M51 36    3  Sacroiliac joint dysfunction  M53 3    4  Lumbar spine strain, subsequent encounter  S39 012D    5  Abdominal weakness  R19 8    6  Weakness of both hips  R29 898    7  Primary osteoarthritis of left knee  M17 12    8  Hamstring tightness of both lower extremities  M62 9        Start Time: 1630  Stop Time: 1723  Total time in clinic (min): 53 minutes    Subjective: Pt reports she has been having pain in the "bone" of her low back recently  Pt reports the pain has been consistently there for around a week  Objective: See treatment diary below      Assessment: Tolerated treatment well  Patient demonstrated fatigue post treatment, exhibited good technique with therapeutic exercises and would benefit from continued PT  Pt was able to progress today by increasing resistance for her pullovers as well as with inclusion of mojgan rows into pt's exercise program  Pt required min verbal cues to facilitate performance of proper technique  Assess pt response to treatment at next visit  Plan: Continue per plan of care        Precautions: N/A      Manuals 7/21 8/5 8/11 8/18 8/23 9/1 9/13 9/29 10/11    Piriformis stretch  1x5 20" ea 1x5  20" ea 1x5 20: ea 1x5 20: ea 1x5  20" ea 1x5  20" ea 1x5 1x5 20"    Hamstring stretch  1x5 20" ea 1x5  20" ea 1x5 20" ea 1x5 20: ea 1x5  20" ea 1x5  20" ea 1x5 1x5 20"                              Neuro Re-Ed             Pt education 8' 5'           PPT  1x10 5" 1x15  5" 1x15 5" 1x15 5" 1x15  5" 1x15  5" 1x15     bridges  2x10 3" 2x10 3" 2x10 3" 2x10 3" 3x10  3" 3x10  3" 3x10 3x10    Prone hip ext  1x10 3" ea 2x10   3" ea   2x10 nv 2x10 2x10 5"    mojgan rows         2x10 17 5#    Marches +TA   Supine  2x10 2x10 2x10 2x10 2x10 2x10 2x10 ea    SLR      2x10 2x10 2x10 2x15    Pullovers +TA   5# 10x  5# 10x 5# 10x 5#  10x 5#  10x 5#  10x 2x10 10#    Ther Ex             LTR 2x10 2x10 2x10 5" 2x10 2x10 2x10 2x10 2x10 2x10 ea    SKTC 1x5 10" 1x5 10" 1x10 B  10" 1x10 10" 1x10 10" 1x10  10" 1x10  10" 1x10     3-way physioball rollout 1x10 1x10 3" 1x10   3" 1x10 3" 1x10 3" 1x10  3" 1x10  3" 1x10 1x10 ea    Seated hamstring stretch 1x5 10"            Prone press up  1x10 time 1x10 1x10 1x10 1x10 1x10 1x10    Standing lumbar ext  1x10 3" time 1x10 1x10 1x10  1x10 1x10    Standing hip abd   NV?           Recumbent bike    6' 6' 6' 6' 6' 6'    Ther Activity             STS from chair    2x10 2x10 2x10 nv  2x10    Cuing for proper technique    3' 3' 3'   3'    Gait Training                                       Modalities

## 2022-10-17 ENCOUNTER — SOCIAL WORK (OUTPATIENT)
Dept: BEHAVIORAL/MENTAL HEALTH CLINIC | Facility: CLINIC | Age: 47
End: 2022-10-17
Payer: COMMERCIAL

## 2022-10-17 DIAGNOSIS — Z63.8 FAMILY CONFLICT: Primary | ICD-10-CM

## 2022-10-17 PROCEDURE — 90834 PSYTX W PT 45 MINUTES: CPT | Performed by: SOCIAL WORKER

## 2022-10-17 SDOH — SOCIAL STABILITY - SOCIAL INSECURITY: OTHER SPECIFIED PROBLEMS RELATED TO PRIMARY SUPPORT GROUP: Z63.8

## 2022-10-18 ENCOUNTER — CONSULT (OUTPATIENT)
Dept: PULMONOLOGY | Facility: CLINIC | Age: 47
End: 2022-10-18
Payer: COMMERCIAL

## 2022-10-18 VITALS
SYSTOLIC BLOOD PRESSURE: 134 MMHG | OXYGEN SATURATION: 97 % | DIASTOLIC BLOOD PRESSURE: 78 MMHG | WEIGHT: 230 LBS | BODY MASS INDEX: 39.27 KG/M2 | TEMPERATURE: 98.6 F | HEART RATE: 78 BPM | HEIGHT: 64 IN | RESPIRATION RATE: 18 BRPM

## 2022-10-18 DIAGNOSIS — E66.9 OBESITY (BMI 30-39.9): ICD-10-CM

## 2022-10-18 DIAGNOSIS — G47.19 EXCESSIVE DAYTIME SLEEPINESS: ICD-10-CM

## 2022-10-18 DIAGNOSIS — G47.33 OSA (OBSTRUCTIVE SLEEP APNEA): Primary | ICD-10-CM

## 2022-10-18 PROCEDURE — 99204 OFFICE O/P NEW MOD 45 MIN: CPT | Performed by: INTERNAL MEDICINE

## 2022-10-18 NOTE — PROGRESS NOTES
Assessment/Plan:     Diagnoses and all orders for this visit:    LESTER (obstructive sleep apnea)  -     CPAP Auto New DME    Excessive daytime sleepiness    Obesity (BMI 30-39  9)          Plan for follow up:  Reviewed patient's recent diagnostic sleep study with good sleep efficiency with moderate obstructive sleep apnea with an AHI of 20 1 associated with events related hypoxemia   Etiology pathogenesis of obstructive sleep apnea discussed in detail   Consequences of untreated sleep apnea discussed  Various treatment options discussed with mandible advancing appliance Pap titration as well as the inspire were discussed   Recommend weight loss   Cautioned against driving when sleepy  She would like to have the PAP machine does not want to go into the lab for the titration  Will have it set up and will start using it any concerns she will give the office a call  Need for compliance with the CPAP machine discussed   Follow-up in 3 months with the CPAP download compliance  Return in about 3 months (around 1/18/2023)  All questions are answered to the patient's satisfaction and understanding  She verbalizes understanding  She is encouraged to call with any further questions or concerns  Portions of the record may have been created with voice recognition software  Occasional wrong word or "sound a like" substitutions may have occurred due to the inherent limitations of voice recognition software  Read the chart carefully and recognize, using context, where substitutions have occurred  a    Electronically Signed by Pablo Edwards MD    ______________________________________________________________________    Chief Complaint:   Chief Complaint   Patient presents with   • Consult   • Sleep Apnea        Patient ID: Ifrah Garcia is a 51 y o  y o  female has a past medical history of Allergic, GERD (gastroesophageal reflux disease), Hypertension, and Varicella      10/18/2022  Patient presents today for initial visit     She is currently working over nights in hospital and she states 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 she had a diagnostic sleep study done because of her history of snoring she states  She works from 10:00 p m  to 6:30 a m  and she has a commute time of about 15 minutes to work currently  She her daily sleep schedule is she goes to bed at around 8:00 a m  falls asleep right away and is out of bed at 3:00 p m  has 1-2 nocturnal awakenings for nocturia and sometimes wakes up choking gasping for air she states  No history of any headaches no symptoms related to restless legs        Occupational/Exposure history:  Works in hospitality serving brings overnight shift currently  Travel history:  None  Review of Systems   Constitutional: Positive for fatigue and unexpected weight change  HENT: Negative  Eyes: Negative  Respiratory: Negative  History of loud snoring witnessed apneic spells choking and gasping for air  Cardiovascular: Negative  Gastrointestinal: Negative  Endocrine: Negative  Genitourinary: Negative  Musculoskeletal: Negative  Allergic/Immunologic: Negative  Neurological: Negative  Hematological: Negative  Psychiatric/Behavioral: Negative  Social history: She reports that she has never smoked  She has never used smokeless tobacco  She reports previous alcohol use  She reports that she does not use drugs      Past surgical history:   Past Surgical History:   Procedure Laterality Date   •  SECTION      x2      Family history:   Family History   Problem Relation Age of Onset   • Esophageal cancer Mother    • Heart disease Father    • No Known Problems Sister    • No Known Problems Daughter    • No Known Problems Maternal Grandmother    • No Known Problems Maternal Grandfather    • No Known Problems Paternal Grandmother    • No Known Problems Paternal Grandfather    • No Known Problems Sister    • No Known Problems Brother    • No Known Problems Brother    • No Known Problems Son    • No Known Problems Maternal Aunt    • No Known Problems Paternal Aunt    • No Known Problems Paternal Aunt    • No Known Problems Paternal Aunt    • No Known Problems Paternal Aunt    • No Known Problems Paternal Aunt        Immunization History   Administered Date(s) Administered   • COVID-19 PFIZER VACCINE 0 3 ML IM 07/23/2021, 08/13/2021   • COVID-19 Pfizer vac (Gary-sucrose, gray cap) 12 yr+ IM 04/04/2022     Current Outpatient Medications   Medication Sig Dispense Refill   • Iron-Vitamin C (Vitron-C)  MG TABS Take 1 tablet by mouth 2 (two) times a day 60 tablet 3   • metFORMIN (GLUCOPHAGE-XR) 500 mg 24 hr tablet Take 1 tablet by mouth 2 (two) times a day     • sertraline (ZOLOFT) 50 mg tablet Take 1 tablet (50 mg total) by mouth daily 90 tablet 5   • Diclofenac Sodium (VOLTAREN) 1 % Apply 2 g topically in the morning and 2 g at noon and 2 g in the evening and 2 g before bedtime  150 g 3   • levothyroxine (Euthyrox) 50 mcg tablet Take 1 tablet (50 mcg total) by mouth daily in the early morning 90 tablet 3   • meloxicam (Mobic) 15 mg tablet Take 1 tablet (15 mg total) by mouth daily 30 tablet 1   • methocarbamol (ROBAXIN) 500 mg tablet Take 1 tablet (500 mg total) by mouth in the morning and 1 tablet (500 mg total) at noon and 1 tablet (500 mg total) in the evening and 1 tablet (500 mg total) before bedtime  30 tablet 0   • pantoprazole (PROTONIX) 20 mg tablet Take 1 tablet (20 mg total) by mouth daily before breakfast 30 tablet 5     No current facility-administered medications for this visit  Allergies: Patient has no known allergies      Objective:  Vitals:    10/18/22 1413 10/18/22 1416   BP: 134/78    BP Location: Right arm    Patient Position: Sitting    Cuff Size: Large    Pulse: 78    Resp: 18    Temp: 98 6 °F (37 °C)    TempSrc: Tympanic    SpO2: 97% 97%   Weight: 104 kg (230 lb)    Height: 5' 4" (1 626 m)    Oxygen Therapy  SpO2: 97 %  Oxygen Therapy: None (Room air)    Wt Readings from Last 3 Encounters:   10/18/22 104 kg (230 lb)   09/27/22 106 kg (233 lb)   09/11/22 103 kg (228 lb)     Body mass index is 39 48 kg/m²  Physical Exam  Constitutional:       Appearance: She is well-developed  HENT:      Head: Normocephalic and atraumatic  Mouth/Throat:      Comments: Crowded oropharyngeal airways  Eyes:      Pupils: Pupils are equal, round, and reactive to light  Neck:      Comments: Short and wide neck  Cardiovascular:      Rate and Rhythm: Normal rate and regular rhythm  Heart sounds: Normal heart sounds  Pulmonary:      Effort: Pulmonary effort is normal       Breath sounds: Normal breath sounds  Musculoskeletal:         General: Normal range of motion  Cervical back: Normal range of motion and neck supple  Skin:     General: Skin is warm and dry  Neurological:      Mental Status: She is alert and oriented to person, place, and time  Psychiatric:         Behavior: Behavior normal            Diagnostics:  I have personally reviewed pertinent reports      ESS: Total score: 18

## 2022-10-19 PROBLEM — Z63.8 FAMILY CONFLICT: Status: ACTIVE | Noted: 2022-10-19
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
## 2022-10-20 ENCOUNTER — OFFICE VISIT (OUTPATIENT)
Dept: PHYSICAL THERAPY | Facility: CLINIC | Age: 47
End: 2022-10-20
Payer: COMMERCIAL

## 2022-10-20 DIAGNOSIS — M51.36 DEGENERATIVE DISC DISEASE, LUMBAR: ICD-10-CM

## 2022-10-20 DIAGNOSIS — M47.816 LUMBAR FACET ARTHROPATHY: Primary | ICD-10-CM

## 2022-10-20 DIAGNOSIS — R19.8 ABDOMINAL WEAKNESS: ICD-10-CM

## 2022-10-20 DIAGNOSIS — M62.9 HAMSTRING TIGHTNESS OF BOTH LOWER EXTREMITIES: ICD-10-CM

## 2022-10-20 DIAGNOSIS — R29.898 WEAKNESS OF BOTH HIPS: ICD-10-CM

## 2022-10-20 DIAGNOSIS — S39.012D LUMBAR SPINE STRAIN, SUBSEQUENT ENCOUNTER: ICD-10-CM

## 2022-10-20 DIAGNOSIS — M53.3 SACROILIAC JOINT DYSFUNCTION: ICD-10-CM

## 2022-10-20 DIAGNOSIS — M17.12 PRIMARY OSTEOARTHRITIS OF LEFT KNEE: ICD-10-CM

## 2022-10-20 PROCEDURE — 97530 THERAPEUTIC ACTIVITIES: CPT | Performed by: PHYSICAL THERAPIST

## 2022-10-20 PROCEDURE — 97110 THERAPEUTIC EXERCISES: CPT | Performed by: PHYSICAL THERAPIST

## 2022-10-20 PROCEDURE — 97140 MANUAL THERAPY 1/> REGIONS: CPT | Performed by: PHYSICAL THERAPIST

## 2022-10-20 PROCEDURE — 97112 NEUROMUSCULAR REEDUCATION: CPT | Performed by: PHYSICAL THERAPIST

## 2022-10-20 NOTE — PROGRESS NOTES
Daily Note     Today's date: 10/20/2022  Patient name: Chica Alcaraz  : 1975  MRN: [id number]  Referring provider: Michael Lo DO  Dx:   Encounter Diagnosis     ICD-10-CM    1  Lumbar facet arthropathy  M47 816    2  Degenerative disc disease, lumbar  M51 36    3  Sacroiliac joint dysfunction  M53 3    4  Lumbar spine strain, subsequent encounter  S39 012D    5  Abdominal weakness  R19 8    6  Weakness of both hips  R29 898    7  Primary osteoarthritis of left knee  M17 12    8  Hamstring tightness of both lower extremities  M62 9        Start Time: 62  Stop Time: 5107  Total time in clinic (min): 40 minutes    Subjective: Pt reports she was performing her home exercises when she felt a crack in her back and had a significant reduction in low back pain  Objective: See treatment diary below      Assessment: Tolerated treatment well  Patient demonstrated fatigue post treatment, exhibited good technique with therapeutic exercises and would benefit from continued PT  Pt continues to demonstrate an improved tolerance and technique to all exercises at today's session  Pt required min verbal cues to facilitate performance of proper technique  Assess pt response to treatment at next visit  Plan: Continue per plan of care        Precautions: N/A      Manuals  8/5 8/11 8/18 8/23 9/1 9/13 9/29 10/11 10/20   Piriformis stretch  1x5 20" ea 1x5  20" ea 1x5 20: ea 1x5 20: ea 1x5  20" ea 1x5  20" ea 1x5 1x5 20" 1x5 20"   Hamstring stretch  1x5 20" ea 1x5  20" ea 1x5 20" ea 1x5 20: ea 1x5  20" ea 1x5  20" ea 1x5 1x5 20" 1x5 20"                             Neuro Re-Ed             Pt education 8' 5'           PPT  1x10 5" 1x15  5" 1x15 5" 1x15 5" 1x15  5" 1x15  5" 1x15     bridges  2x10 3" 2x10 3" 2x10 3" 2x10 3" 3x10  3" 3x10  3" 3x10 3x10 3x10   Prone hip ext  1x10 3" ea 2x10   3" ea   2x10 nv 2x10 2x10 5" 2x10 5"   mojgan rows         2x10 17 5# 2x10 18#   Marches +TA   Supine  2x10  2x10 2x10 2x10 2x10 2x10 2x10 ea 2x10    SLR      2x10 2x10 2x10 2x15    Pullovers +TA   5# 10x  5# 10x 5# 10x 5#  10x 5#  10x 5#  10x 2x10 10# 2x10 10#   Ther Ex             LTR 2x10 2x10 2x10 5" 2x10 2x10 2x10 2x10 2x10 2x10 ea 2x10 ea   SKTC 1x5 10" 1x5 10" 1x10 B  10" 1x10 10" 1x10 10" 1x10  10" 1x10  10" 1x10     3-way physioball rollout 1x10 1x10 3" 1x10   3" 1x10 3" 1x10 3" 1x10  3" 1x10  3" 1x10 1x10 ea 1x10 ea   Seated hamstring stretch 1x5 10"            Prone press up  1x10 time 1x10 1x10 1x10 1x10 1x10 1x10 1x15   Standing lumbar ext  1x10 3" time 1x10 1x10 1x10  1x10 1x10    Standing hip abd   NV?           Recumbent bike    6' 6' 6' 6' 6' 6' 6'   Ther Activity             STS from chair    2x10 2x10 2x10 nv  2x10 2x10    Cuing for proper technique    3' 3' 3'   3' 3'   Gait Training                                       Modalities

## 2022-10-24 ENCOUNTER — APPOINTMENT (OUTPATIENT)
Dept: PHYSICAL THERAPY | Facility: CLINIC | Age: 47
End: 2022-10-24

## 2022-10-24 ENCOUNTER — TELEMEDICINE (OUTPATIENT)
Dept: BEHAVIORAL/MENTAL HEALTH CLINIC | Facility: CLINIC | Age: 47
End: 2022-10-24

## 2022-10-24 DIAGNOSIS — Z63.8 FAMILY CONFLICT: ICD-10-CM

## 2022-10-24 DIAGNOSIS — F43.23 ADJUSTMENT DISORDER WITH MIXED ANXIETY AND DEPRESSED MOOD: Primary | ICD-10-CM

## 2022-10-24 PROBLEM — R05.9 COUGH: Status: RESOLVED | Noted: 2022-08-25 | Resolved: 2022-10-24

## 2022-10-24 SDOH — SOCIAL STABILITY - SOCIAL INSECURITY: OTHER SPECIFIED PROBLEMS RELATED TO PRIMARY SUPPORT GROUP: Z63.8
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
## 2022-10-27 ENCOUNTER — APPOINTMENT (OUTPATIENT)
Dept: PHYSICAL THERAPY | Facility: CLINIC | Age: 47
End: 2022-10-27

## 2022-10-27 LAB
DME PARACHUTE DELIVERY DATE REQUESTED: NORMAL
DME PARACHUTE ITEM DESCRIPTION: NORMAL
DME PARACHUTE ORDER STATUS: NORMAL
DME PARACHUTE SUPPLIER NAME: NORMAL
DME PARACHUTE SUPPLIER PHONE: NORMAL

## 2022-11-03 ENCOUNTER — OFFICE VISIT (OUTPATIENT)
Dept: PHYSICAL THERAPY | Facility: CLINIC | Age: 47
End: 2022-11-03

## 2022-11-03 DIAGNOSIS — M62.9 HAMSTRING TIGHTNESS OF BOTH LOWER EXTREMITIES: ICD-10-CM

## 2022-11-03 DIAGNOSIS — S39.012D LUMBAR SPINE STRAIN, SUBSEQUENT ENCOUNTER: ICD-10-CM

## 2022-11-03 DIAGNOSIS — M51.36 DEGENERATIVE DISC DISEASE, LUMBAR: ICD-10-CM

## 2022-11-03 DIAGNOSIS — R29.898 WEAKNESS OF BOTH HIPS: ICD-10-CM

## 2022-11-03 DIAGNOSIS — M47.816 LUMBAR FACET ARTHROPATHY: Primary | ICD-10-CM

## 2022-11-03 DIAGNOSIS — M53.3 SACROILIAC JOINT DYSFUNCTION: ICD-10-CM

## 2022-11-03 DIAGNOSIS — R19.8 ABDOMINAL WEAKNESS: ICD-10-CM

## 2022-11-03 DIAGNOSIS — M17.12 PRIMARY OSTEOARTHRITIS OF LEFT KNEE: ICD-10-CM

## 2022-11-03 NOTE — PROGRESS NOTES
PT Discharge    Today's date: 11/3/2022  Patient name: Daniela Monroe  : 1975  MRN: [id number]  Referring provider: Pamela Mills DO  Dx:   Encounter Diagnosis     ICD-10-CM    1  Lumbar facet arthropathy  M47 816    2  Degenerative disc disease, lumbar  M51 36    3  Sacroiliac joint dysfunction  M53 3    4  Lumbar spine strain, subsequent encounter  S39 012D    5  Abdominal weakness  R19 8    6  Weakness of both hips  R29 898    7  Primary osteoarthritis of left knee  M17 12    8  Hamstring tightness of both lower extremities  M62 9        Start Time: 1630  Stop Time: 9505  Total time in clinic (min): 45 minutes    Subjective: Pt reports that her lumbar spine has been feeling better since beginning therapy  Pt reports she has occasional difficulty with her lumbar spine at work, it tends to bother her more towards the end of the day at the end of a shift and 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  Objective: See treatment diary below      Assessment: Tolerated treatment well  Patient demonstrated fatigue post treatment, exhibited good technique with therapeutic exercises and would benefit from continued PT  Pt program kept consistent this visit due to pt being adequately fatigued by program  Pt continues to demonstrate relief of lumbar sx with performance of stretches into flexion such as PB 3-way stretch  Pt will be discharged from PT at this time due to achieving her goals and demonstrating an improved tolerance to work-related and functional activities  Pt's FOTO score kandice from 44 at time of IE to 77, indicating improved functional ability   Reinforced HEP with pt and pt demonstrated good understanding of program     Plan: DC PT      Precautions: N/A      Manuals 11/3 8/5 8/11 8/18 8/23 9/1 9/13 9/29 10/11 10/20   Piriformis stretch 1x5 20" ea 1x5 20" ea 1x5  20" ea 1x5 20: ea 1x5 20: ea 1x5  20" ea 1x5  20" ea 1x5 1x5 20" 1x5 20"   Hamstring stretch 1x5 20" ea 1x5 20" ea 1x5  20" ea 1x5 20" ea 1x5 20: ea 1x5  20" ea 1x5  20" ea 1x5 1x5 20" 1x5 20"                             Neuro Re-Ed             Pt education 3' 5'           PPT  1x10 5" 1x15  5" 1x15 5" 1x15 5" 1x15  5" 1x15  5" 1x15     bridges 3x10 2x10 3" 2x10 3" 2x10 3" 2x10 3" 3x10  3" 3x10  3" 3x10 3x10 3x10   Prone hip ext 2x10 5" 1x10 3" ea 2x10   3" ea   2x10 nv 2x10 2x10 5" 2x10 5"   mojgan rows 2x10 18#        2x10 17 5# 2x10 18#   Marches +TA 2x10 ea  Supine  2x10  2x10 2x10 2x10 2x10 2x10 2x10 ea 2x10    SLR      2x10 2x10 2x10 2x15    Pullovers +TA 2x10 10#  5# 10x  5# 10x 5# 10x 5#  10x 5#  10x 5#  10x 2x10 10# 2x10 10#   Ther Ex             LTR 2x10 2x10 2x10 5" 2x10 2x10 2x10 2x10 2x10 2x10 ea 2x10 ea   SKTC  1x5 10" 1x10 B  10" 1x10 10" 1x10 10" 1x10  10" 1x10  10" 1x10     3-way physioball rollout 1x10 3" ea 1x10 3" 1x10   3" 1x10 3" 1x10 3" 1x10  3" 1x10  3" 1x10 1x10 ea 1x10 ea   Seated hamstring stretch             Prone press up NP 1x10 time 1x10 1x10 1x10 1x10 1x10 1x10 1x15   Standing lumbar ext NP 1x10 3" time 1x10 1x10 1x10  1x10 1x10    Standing hip abd   NV?           Recumbent bike 6'   6' 6' 6' 6' 6' 6' 6'   Ther Activity             STS from chair 2x10   2x10 2x10 2x10 nv  2x10 2x10    Cuing for proper technique 3'   3' 3' 3'   3' 3'   Gait Training                                       Modalities

## 2022-11-04 ENCOUNTER — APPOINTMENT (OUTPATIENT)
Dept: LAB | Facility: HOSPITAL | Age: 47
End: 2022-11-04

## 2022-11-04 DIAGNOSIS — E03.9 HYPOTHYROIDISM, UNSPECIFIED TYPE: ICD-10-CM

## 2022-11-04 LAB — TSH SERPL DL<=0.05 MIU/L-ACNC: 13.14 UIU/ML (ref 0.45–4.5)

## 2022-11-05 LAB — T4 FREE SERPL-MCNC: 0.56 NG/DL (ref 0.76–1.46)

## 2022-11-09 ENCOUNTER — TELEMEDICINE (OUTPATIENT)
Dept: BEHAVIORAL/MENTAL HEALTH CLINIC | Facility: CLINIC | Age: 47
End: 2022-11-09

## 2022-11-09 DIAGNOSIS — F43.23 ADJUSTMENT DISORDER WITH MIXED ANXIETY AND DEPRESSED MOOD: Primary | ICD-10-CM

## 2022-11-09 DIAGNOSIS — Z63.8 FAMILY CONFLICT: ICD-10-CM

## 2022-11-09 SDOH — SOCIAL STABILITY - SOCIAL INSECURITY: OTHER SPECIFIED PROBLEMS RELATED TO PRIMARY SUPPORT GROUP: Z63.8

## 2022-11-09 NOTE — PSYCH
This note was not shared with the patient due to this is a psychotherapy note      Virtual Regular Visit    Verification of patient location:    Patient is located in the following state in which I hold an active license [place]      Assessment/Plan:    Problem List Items Addressed This Visit        Other    Adjustment disorder with mixed anxiety and depressed mood - Primary    Family conflict          Goals addressed in session: Goal 1          Reason for visit is   Chief Complaint   Patient presents with   • Virtual Regular Visit        Encounter provider [facility]    Provider located at [address] [place] [address] [address] [place] [address] [phone]    The patient was identified by name and date of birth  Gerry Sotelo was informed that this is a telemedicine visit and that the visit is being conducted thr[email or web address]  She agrees to proceed     My office door was closed  No one else was in the room  She acknowledged consent and understanding of privacy and security of the video platform  The patient has agreed to participate and understands they can discontinue the visit at any time  Patient is aware this is a billable service  Subjective  Gerry Sotelo is a 51 y o  female   Psychotherapy Provided: Individual Psychotherapy 40 minutes     Length of time in session: 40 minutes, follow up in 1 week    Goals addressed in session: Goal 1     Pain:      none    0    Current suicide risk : Low     Met with Jessy Szymanski Wendie says she had a difficult few days, almost calling the office to see if she could get a sooner appt  She primarily vented about some frustrations in her marriage  Prior to last July, her and her  saw each other for a few weeks at a time due to living in different [place]    Her and her  have been arguing, communication appears to be a problem in their relationship with their transition of living together full time  She feels his thinking is rigid and she needs to adapt to his ways or he gets upset  Jessy expressed feelings of frustration, saying her  makes her feel useless or that anything she does is not good enough  She's begun struggling to express her wants and needs to her  due to past responses, now often internalizing her feelings  Support therapy was provided  She was introduced DBT interpersonal skills, FAST, GIVE, and WHITNEY  Worksheets emailed for her to also review on own  A referral to couples counseling was suggested  Curtis Medrano agreed and planned to speak to her  about his thoughts prior to next session  A positive is her  has begun receiving OP psychiatry and therapy care, Curtis Medrano feels this will be helpful as her  also struggles with depression  Curtis Medrano also has been adapting to healthier living habits by following through with thyroid lab work and started a thyroid medication and joining a program offered through her 's work to support nutrition and exercise needs  Session ended with self esteem exercise to improve view of self and confidence  Curtis Medrano will continue to implement wellness tools; share successes and challenges  Speak to  about referral to marriage counseling  Continue support x 1-2 weeks       Mental status:  Appearance calm and cooperative , adequate hygiene and grooming and poor eye contact    Mood slightly depressed, anxious   Affect Mood congruent   Speech a normal rate and volume   Thought Processes coherent/organized   Hallucinations no hallucinations present    Thought Content no delusions   Abnormal Thoughts no suicidal thoughts  and no homicidal thoughts    Orientation  oriented to person and place and time   Remote Memory short term memory intact and long term memory intact   Attention Span concentration intact   Intellect Appears to be of Average Intelligence   Fund of Knowledge displays adequate knowledge of current events   Insight fair   Judgement fair   Muscle Strength Unable to assess due to virtual session   Language no difficulty naming common objects   Pain none   Pain Scale 0           Behavioral Health Treatment Plan [facility]: Diagnosis and Treatment Plan explained to Susanne Pappas relates understanding diagnosis and is agreeable to Treatment Plan   Yes     Visit start and stop times:    11/09/22  Start Time: 1501  Stop Time: 1541  Total Visit Time: 40 minutes

## 2022-11-10 ENCOUNTER — TELEPHONE (OUTPATIENT)
Dept: FAMILY MEDICINE CLINIC | Facility: CLINIC | Age: 47
End: 2022-11-10

## 2022-11-10 NOTE — TELEPHONE ENCOUNTER
Left message on machine asking patient to call back and schedule an appointment to review labs      ----- Message from [employer] sent at 11/10/2022  9:09 AM EST -----  Please make appt to discuss elevated tsh

## 2022-11-14 ENCOUNTER — SOCIAL WORK (OUTPATIENT)
Dept: BEHAVIORAL/MENTAL HEALTH CLINIC | Facility: CLINIC | Age: 47
End: 2022-11-14

## 2022-11-14 DIAGNOSIS — Z63.8 FAMILY CONFLICT: ICD-10-CM

## 2022-11-14 DIAGNOSIS — F43.23 ADJUSTMENT DISORDER WITH MIXED ANXIETY AND DEPRESSED MOOD: Primary | ICD-10-CM

## 2022-11-14 SDOH — SOCIAL STABILITY - SOCIAL INSECURITY: OTHER SPECIFIED PROBLEMS RELATED TO PRIMARY SUPPORT GROUP: Z63.8

## 2022-11-15 ENCOUNTER — OFFICE VISIT (OUTPATIENT)
Dept: FAMILY MEDICINE CLINIC | Facility: CLINIC | Age: 47
End: 2022-11-15

## 2022-11-15 VITALS
TEMPERATURE: 97.8 F | HEIGHT: 64 IN | SYSTOLIC BLOOD PRESSURE: 144 MMHG | HEART RATE: 72 BPM | OXYGEN SATURATION: 97 % | WEIGHT: 230 LBS | DIASTOLIC BLOOD PRESSURE: 92 MMHG | RESPIRATION RATE: 18 BRPM | BODY MASS INDEX: 39.27 KG/M2

## 2022-11-15 DIAGNOSIS — R09.81 SINUS CONGESTION: Primary | ICD-10-CM

## 2022-11-15 DIAGNOSIS — E03.9 HYPOTHYROIDISM, UNSPECIFIED TYPE: ICD-10-CM

## 2022-11-15 RX ORDER — LEVOTHYROXINE SODIUM 0.05 MG/1
50 TABLET ORAL DAILY
COMMUNITY

## 2022-11-15 RX ORDER — PREDNISONE 20 MG/1
20 TABLET ORAL DAILY
Qty: 5 TABLET | Refills: 0 | Status: SHIPPED | OUTPATIENT
Start: 2022-11-15

## 2022-11-15 NOTE — PROGRESS NOTES
Name: Jaimie Zavala      : 1975      MRN: [id number]  Encounter Provider: SKYE Brandon  Encounter Date: 11/15/2022   Encounter department: [address]     1  Sinus congestion  Assessment & Plan:  Patient has sinus pain and pressure ongoing for about 2 weeks  No fevers or chills  Prednisone therapy continue steam to help decongest increase fluid hydration    Orders:  -     predniSONE 20 mg tablet; Take 1 tablet (20 mg total) by mouth daily    2  Hypothyroidism, unspecified type  Assessment & Plan:  Was  Has not been taking her medications  Education provided on compliance  Discussed take medication as soon as she wakes up   Will recheck levels in 6 weeks    Orders:  -     TSH, 3rd generation with Free T4 reflex; Future; Expected date: 2022           Subjective      Elevated thyroid level  Is that she has been consistently taking her levothyroxine, just restarted  Has been doing well  Does have some chest congestion and sinus congestion from a call it has been going on for about 2 weeks continues to work night shift    Review of Systems   Constitutional: Negative  HENT: Positive for congestion, sinus pressure and sinus pain  Eyes: Negative  Respiratory: Negative  Cardiovascular: Negative  Gastrointestinal: Negative  Endocrine: Negative  Genitourinary: Negative  Musculoskeletal: Negative  Allergic/Immunologic: Negative  Neurological: Negative  Psychiatric/Behavioral: Negative          Current Outpatient Medications on File Prior to Visit   Medication Sig   • Iron-Vitamin C (Vitron-C)  MG TABS Take 1 tablet by mouth 2 (two) times a day   • levothyroxine 50 mcg tablet Take 50 mcg by mouth daily   • sertraline (ZOLOFT) 50 mg tablet Take 1 tablet (50 mg total) by mouth daily   • [DISCONTINUED] Diclofenac Sodium (VOLTAREN) 1 % Apply 2 g topically in the morning and 2 g at noon and 2 g in the evening and 2 g before bedtime  • [DISCONTINUED] levothyroxine (Euthyrox) 50 mcg tablet Take 1 tablet (50 mcg total) by mouth daily in the early morning   • [DISCONTINUED] meloxicam (Mobic) 15 mg tablet Take 1 tablet (15 mg total) by mouth daily   • [DISCONTINUED] metFORMIN (GLUCOPHAGE-XR) 500 mg 24 hr tablet Take 1 tablet by mouth 2 (two) times a day   • [DISCONTINUED] methocarbamol (ROBAXIN) 500 mg tablet Take 1 tablet (500 mg total) by mouth in the morning and 1 tablet (500 mg total) at noon and 1 tablet (500 mg total) in the evening and 1 tablet (500 mg total) before bedtime  • [DISCONTINUED] pantoprazole (PROTONIX) 20 mg tablet Take 1 tablet (20 mg total) by mouth daily before breakfast       Objective     /92   Pulse 72   Temp 97 8 °F (36 6 °C) (Temporal)   Resp 18   Ht 5' 4" (1 626 m)   Wt 104 kg (230 lb)   SpO2 97%   BMI 39 48 kg/m²     Physical Exam  Vitals and nursing note reviewed  Constitutional:       Appearance: She is well-developed  She is obese  HENT:      Head: Normocephalic and atraumatic  Nose: Congestion present  Eyes:      Pupils: Pupils are equal, round, and reactive to light  Cardiovascular:      Rate and Rhythm: Normal rate and regular rhythm  Pulmonary:      Effort: Pulmonary effort is normal    Chest:      Chest wall: Tenderness present  Abdominal:      General: Bowel sounds are normal       Palpations: Abdomen is soft  Musculoskeletal:         General: Normal range of motion  Cervical back: Normal range of motion  Skin:     General: Skin is warm and dry  Neurological:      Mental Status: She is alert and oriented to person, place, and time         SKYE Griggs

## 2022-11-15 NOTE — PATIENT INSTRUCTIONS
Get blood work done at the end of December  Continue with levothyroxine  Continue with Zoloft  Look up beginners yoga stretches on you tube

## 2022-11-15 NOTE — ASSESSMENT & PLAN NOTE
Was   Has not been taking her medications  Education provided on compliance  Discussed take medication as soon as she wakes up     Will recheck levels in 6 weeks

## 2022-11-15 NOTE — ASSESSMENT & PLAN NOTE
Patient has sinus pain and pressure ongoing for about 2 weeks  No fevers or chills    Prednisone therapy continue steam to help decongest increase fluid hydration Per ct tech pt's iv blew while attempting to give iv contrast and ct tech started a additional iv in left ac.       Ti Downey RN  04/14/22 [id number]

## 2022-11-16 ENCOUNTER — TELEPHONE (OUTPATIENT)
Dept: PSYCHIATRY | Facility: CLINIC | Age: 47
End: 2022-11-16
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
## 2022-11-23 LAB

## 2022-11-28 ENCOUNTER — TELEMEDICINE (OUTPATIENT)
Dept: BEHAVIORAL/MENTAL HEALTH CLINIC | Facility: CLINIC | Age: 47
End: 2022-11-28

## 2022-11-28 DIAGNOSIS — Z63.8 FAMILY CONFLICT: ICD-10-CM

## 2022-11-28 DIAGNOSIS — F43.23 ADJUSTMENT DISORDER WITH MIXED ANXIETY AND DEPRESSED MOOD: Primary | ICD-10-CM

## 2022-11-28 SDOH — SOCIAL STABILITY - SOCIAL INSECURITY: OTHER SPECIFIED PROBLEMS RELATED TO PRIMARY SUPPORT GROUP: Z63.8
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
## 2022-12-08 DIAGNOSIS — E03.9 HYPOTHYROIDISM, UNSPECIFIED TYPE: Primary | ICD-10-CM

## 2022-12-08 RX ORDER — LEVOTHYROXINE SODIUM 0.05 MG/1
50 TABLET ORAL DAILY
Qty: 90 TABLET | Refills: 1 | Status: SHIPPED | OUTPATIENT
Start: 2022-12-08

## 2022-12-12 ENCOUNTER — TELEMEDICINE (OUTPATIENT)
Dept: BEHAVIORAL/MENTAL HEALTH CLINIC | Facility: CLINIC | Age: 47
End: 2022-12-12

## 2022-12-12 DIAGNOSIS — F43.23 ADJUSTMENT DISORDER WITH MIXED ANXIETY AND DEPRESSED MOOD: Primary | ICD-10-CM

## 2022-12-13 NOTE — PSYCH
This note was not shared with the patient due to this is a psychotherapy note    Virtual Regular Visit    Verification of patient location:    Patient is located in the following state in which I hold an active license [place]      Assessment/Plan:    Problem List Items Addressed This Visit        Other    Adjustment disorder with mixed anxiety and depressed mood - Primary       Goals addressed in session: Goal 1          Reason for visit is   Chief Complaint   Patient presents with   • Virtual Regular Visit        Encounter provider [facility]    Provider located at [address] [place] [address] [phone]     The patient was identified by name and date of birth  Antoine Galindo was informed that this is a telemedicine visit and that the visit is being conducted [facility]  She agrees to proceed     My office door was closed  No one else was in the room  She acknowledged consent and understanding of privacy and security of the video platform  The patient has agreed to participate and understands they can discontinue the visit at any time  Patient is aware this is a billable service  Subjective  Antoine Galindo is a 51 y o  female    Psychotherapy Provided: Individual Psychotherapy 46 minutes     Length of time in session: 46 minutes, follow up in 2 week    Goals addressed in session: Goal 1     Pain:      none    0    Current suicide risk : Low     Met with Jessy [employer] has begun a second job over the holiday season working for [employer]  She is working approximately 16 hours a day  Her marriage remains her primary stressor as they continue to sit down and discuss their problems in their relationship  Session focused on Jessy primarily venting about ongoing marital conflict  She feels invalidated and unsupported    She expresses frustration as she does not feel her  recognizes the challenges she has overcome over the past year as [place]  She plans to continue their conversation tonight when he gets home from work  She remains on the wait list for marriage counseling  She was encouraged to call the office and f/u on status of referral    Encouraged self care  Barbara Olivo will continue biweekly support  Behavioral Health Treatment Plan [facility]: Diagnosis and Treatment Plan explained to Roula Mora relates understanding diagnosis and is agreeable to Treatment Plan   Yes     Visit start and stop times:    12/13/22  Start Time: 1600  Stop Time: 1646  Total Visit Time: 46 minutes

## 2022-12-24 ENCOUNTER — APPOINTMENT (EMERGENCY)
Dept: RADIOLOGY | Facility: HOSPITAL | Age: 47
End: 2022-12-24

## 2022-12-24 ENCOUNTER — HOSPITAL ENCOUNTER (EMERGENCY)
Facility: HOSPITAL | Age: 47
Discharge: HOME | End: 2022-12-24
Attending: EMERGENCY MEDICINE

## 2022-12-24 VITALS
TEMPERATURE: 99.1 F | HEART RATE: 75 BPM | OXYGEN SATURATION: 100 % | SYSTOLIC BLOOD PRESSURE: 193 MMHG | RESPIRATION RATE: 17 BRPM | DIASTOLIC BLOOD PRESSURE: 120 MMHG

## 2022-12-24 DIAGNOSIS — M25.571 PAIN AND SWELLING OF RIGHT ANKLE: ICD-10-CM

## 2022-12-24 DIAGNOSIS — S82.891A CLOSED FRACTURE OF RIGHT ANKLE, INITIAL ENCOUNTER: Primary | ICD-10-CM

## 2022-12-24 DIAGNOSIS — M25.471 PAIN AND SWELLING OF RIGHT ANKLE: ICD-10-CM
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
## 2022-12-24 RX ORDER — OXYCODONE HYDROCHLORIDE 5 MG/1
5 TABLET ORAL EVERY 6 HOURS PRN
Qty: 12 TABLET | Refills: 0 | Status: SHIPPED | OUTPATIENT
Start: 2022-12-24 | End: 2023-01-03

## 2022-12-24 RX ORDER — OXYCODONE HYDROCHLORIDE 5 MG/1
5 TABLET ORAL ONCE
Status: COMPLETED | OUTPATIENT
Start: 2022-12-24 | End: 2022-12-24

## 2022-12-24 RX ORDER — ACETAMINOPHEN 325 MG/1
975 TABLET ORAL ONCE
Status: COMPLETED | OUTPATIENT
Start: 2022-12-24 | End: 2022-12-24

## 2022-12-24 RX ORDER — IBUPROFEN 600 MG/1
600 TABLET ORAL ONCE
Status: COMPLETED | OUTPATIENT
Start: 2022-12-24 | End: 2022-12-24

## 2022-12-24 RX ADMIN — ACETAMINOPHEN 975 MG: 325 TABLET ORAL at 15:59

## 2022-12-24 RX ADMIN — OXYCODONE HYDROCHLORIDE 5 MG: 5 TABLET ORAL at 15:59

## 2022-12-24 RX ADMIN — IBUPROFEN 600 MG: 600 TABLET, FILM COATED ORAL at 15:59

## 2022-12-24 NOTE — DISCHARGE INSTRUCTIONS
Please follow up PCP, ortho, and occupational medicine  Please do not put any weight on your right leg or ankle  Recommend tylenol 650 mg and ibuprofen 600 mg every 6 hours as needed for pain  Please return for severe chest pain, significant shortness of breath, severely worsening symptoms, or any other concerning signs or symptoms  Please refer to the following documents for additional instructions and return precautions

## 2022-12-24 NOTE — Clinical Note
Bernabe Sharma was seen and treated in our emergency department on 12/24/2022  No work until cleared by Family Doctor/Orthopedics        Diagnosis: Right ankle fracture    Jessy    She may return on this date: If you have any questions or concerns, please don't hesitate to call        Queen Rosalba MD    ______________________________           _______________          _______________  Hospital Representative                              Date                                Time

## 2022-12-24 NOTE — ED PROVIDER NOTES
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  Patient has right ankle pain and swelling  Patient does report hitting her head, but no LOC and no blood thinners or ASA  Patient unable to put weight on her foot,     79-year-old female history of hypertension presenting with right ankle pain and swelling  Patient reports slipping at work with pain and swelling  Unable to bear weight given pain  Denies any other pain or injury  Denies any head strike or LOC  Denies any antiplatelet anticoagulant medications  Denies any chest pain shortness of breath  Denies any previous known significant injury to right ankle or any previous surgery  Denies any other complaints  Chart reviewed  Past Medical History:  No date: Allergic  No date: GERD (gastroesophageal reflux disease)  No date: Hypertension  No date: Varicella  Family History: non-contributory  Social History            Prior to Admission Medications   Prescriptions Last Dose Informant Patient Reported? Taking?    Iron-Vitamin C (Vitron-C)  MG TABS   No No   Sig: Take 1 tablet by mouth 2 (two) times a day   levothyroxine 50 mcg tablet   No No   Sig: Take 1 tablet (50 mcg total) by mouth daily   predniSONE 20 mg tablet   No No   Sig: Take 1 tablet (20 mg total) by mouth daily   sertraline (ZOLOFT) 50 mg tablet   No No   Sig: Take 1 tablet (50 mg total) by mouth daily      Facility-Administered Medications: None       Past Medical History:   Diagnosis Date   • Allergic    • GERD (gastroesophageal reflux disease)    • Hypertension    • Varicella        Past Surgical History:   Procedure Laterality Date   •  SECTION      x2        Family History   Problem Relation Age of Onset   • Esophageal cancer Mother    • Heart disease Father    • No Known Problems Sister    • No Known Problems Daughter    • No Known Problems Maternal Grandmother    • No Known Problems Maternal Grandfather    • No Known Problems Paternal Grandmother    • No Known Problems Paternal Grandfather    • No Known Problems Sister    • No Known Problems Brother    • No Known Problems Brother    • No Known Problems Son    • No Known Problems Maternal Aunt    • No Known Problems Paternal Aunt    • No Known Problems Paternal Aunt    • No Known Problems Paternal Aunt    • No Known Problems Paternal Aunt    • No Known Problems Paternal Aunt      I have reviewed and agree with the history as documented  E-Cigarette/Vaping   • E-Cigarette Use Never User      E-Cigarette/Vaping Substances   • Nicotine No    • THC No    • CBD No    • Flavoring No    • Other No    • Unknown No      Social History     Tobacco Use   • Smoking status: Never   • Smokeless tobacco: Never   Vaping Use   • Vaping Use: Never used   Substance Use Topics   • Alcohol use: Not Currently   • Drug use: Never       Review of Systems   Constitutional: Negative for appetite change, chills, diaphoresis, fever and unexpected weight change  HENT: Negative for congestion and rhinorrhea  Eyes: Negative for photophobia and visual disturbance  Respiratory: Negative for cough, chest tightness and shortness of breath  Cardiovascular: Negative for chest pain, palpitations and leg swelling  Gastrointestinal: Negative for abdominal distention, abdominal pain, blood in stool, constipation, diarrhea, nausea and vomiting  Genitourinary: Negative for dysuria and hematuria  Musculoskeletal: Positive for arthralgias  Negative for back pain, joint swelling, neck pain and neck stiffness  Skin: Negative for color change, pallor, rash and wound  Neurological: Negative for dizziness, syncope, weakness, light-headedness and headaches  Psychiatric/Behavioral: Negative for agitation  All other systems reviewed and are negative  Physical Exam  Physical Exam  Vitals and nursing note reviewed     Constitutional:       General: She is not in acute distress  Appearance: Normal appearance  She is well-developed  She is not ill-appearing, toxic-appearing or diaphoretic  HENT:      Head: Normocephalic and atraumatic  Nose: Nose normal  No congestion or rhinorrhea  Mouth/Throat:      Mouth: Mucous membranes are moist       Pharynx: Oropharynx is clear  No oropharyngeal exudate or posterior oropharyngeal erythema  Eyes:      General: No scleral icterus  Right eye: No discharge  Left eye: No discharge  Extraocular Movements: Extraocular movements intact  Conjunctiva/sclera: Conjunctivae normal       Pupils: Pupils are equal, round, and reactive to light  Neck:      Vascular: No JVD  Trachea: No tracheal deviation  Comments: Supple  Normal range of motion  Cardiovascular:      Rate and Rhythm: Normal rate and regular rhythm  Heart sounds: Normal heart sounds  No murmur heard  No friction rub  No gallop  Comments: Normal rate and regular rhythm  Pulmonary:      Effort: Pulmonary effort is normal  No respiratory distress  Breath sounds: Normal breath sounds  No stridor  No wheezing or rales  Comments: Clear to auscultation bilaterally  Chest:      Chest wall: No tenderness  Abdominal:      General: Bowel sounds are normal  There is no distension  Palpations: Abdomen is soft  Tenderness: There is no abdominal tenderness  There is no right CVA tenderness, left CVA tenderness, guarding or rebound  Comments: Soft, nontender, nondistended  Normal bowel sounds throughout   Musculoskeletal:         General: Swelling and tenderness present  No deformity or signs of injury  Normal range of motion  Cervical back: Normal range of motion and neck supple  No rigidity  No muscular tenderness  Right lower leg: No edema  Left lower leg: No edema        Comments: Mild medial right malleolar tenderness with significant right lateral malleoli are tenderness with associated swelling  2+ DP PT pulses  Range of motion limited secondary to pain  No proximal fibular tenderness or knee tenderness  Normal knee exam and normal range of motion  No foot tenderness including base fifth metatarsal or navicular   Lymphadenopathy:      Cervical: No cervical adenopathy  Skin:     General: Skin is warm and dry  Coloration: Skin is not pale  Findings: No erythema or rash  Neurological:      General: No focal deficit present  Mental Status: She is alert  Mental status is at baseline  Sensory: No sensory deficit  Motor: No weakness or abnormal muscle tone  Coordination: Coordination normal       Gait: Gait normal       Comments: Alert  Strength and sensation grossly intact  Ambulatory without difficulty at baseline  Psychiatric:         Behavior: Behavior normal          Thought Content:  Thought content normal          Vital Signs  ED Triage Vitals   Temperature Pulse Respirations Blood Pressure SpO2   12/24/22 1510 12/24/22 1510 12/24/22 1510 12/24/22 1510 12/24/22 1510   99 1 °F (37 3 °C) 75 17 (!) 193/120 100 %      Temp src Heart Rate Source Patient Position - Orthostatic VS BP Location FiO2 (%)   -- 12/24/22 1510 12/24/22 1510 12/24/22 1510 --    Monitor Sitting Left arm       Pain Score       12/24/22 1559       10 - Worst Possible Pain           Vitals:    12/24/22 1510   BP: (!) 193/120   Pulse: 75   Patient Position - Orthostatic VS: Sitting         Visual Acuity  Visual Acuity    Flowsheet Row Most Recent Value   L Pupil Size (mm) 3   R Pupil Size (mm) 3          ED Medications  Medications   acetaminophen (TYLENOL) tablet 975 mg (975 mg Oral Given 12/24/22 1559)   ibuprofen (MOTRIN) tablet 600 mg (600 mg Oral Given 12/24/22 1559)   oxyCODONE (ROXICODONE) IR tablet 5 mg (5 mg Oral Given 12/24/22 1559)       Diagnostic Studies  Results Reviewed     None                 XR ankle 3+ views RIGHT    (Results Pending)              Procedures  Procedures ED Course                               SBIRT 22yo+    Flowsheet Row Most Recent Value   SBIRT (23 yo +)    In order to provide better care to our patients, we are screening all of our patients for alcohol and drug use  Would it be okay to ask you these screening questions? Yes Filed at: 12/24/2022 1522   Initial Alcohol Screen: US AUDIT-C     1  How often do you have a drink containing alcohol? 0 Filed at: 12/24/2022 1522   2  How many drinks containing alcohol do you have on a typical day you are drinking? 0 Filed at: 12/24/2022 1522   3b  FEMALE Any Age, or MALE 65+: How often do you have 4 or more drinks on one occassion? 0 Filed at: 12/24/2022 1522   Audit-C Score 0 Filed at: 12/24/2022 1522   POLA: How many times in the past year have you    Used an illegal drug or used a prescription medication for non-medical reasons? Never Filed at: 12/24/2022 1522                    MDM  Number of Diagnoses or Management Options  Closed fracture of right ankle, initial encounter  Fall from standing, initial encounter  Pain and swelling of right ankle  Diagnosis management comments: 58-year-old female history of hypertension presenting with right ankle pain and swelling  Possible sprain versus fracture  Plan for x-rays  Apply ice  Oral medications  Reassess  X-rays concerning for mild displaced right distal fibular fracture and very mild distal tip medial malleoli  No displacement  Short leg and sugar-tong splint applied by tech  Neurovascular intact after application  Prescription sent to pharmacy  Work note  Orthopedic referral  Discussed results and recommendations  Advised follow up PCP and ortho and occupational medicine  Medication recommendations  Given instructions and return precautions  Patient/family at bedside acknowledged understanding of all written and verbal instructions and return precautions  Discharged          Amount and/or Complexity of Data Reviewed  Clinical lab tests: reviewed  Tests in the radiology section of CPT®: reviewed and ordered  Tests in the medicine section of CPT®: reviewed  Review and summarize past medical records: yes  Independent visualization of images, tracings, or specimens: yes    Risk of Complications, Morbidity, and/or Mortality  Presenting problems: moderate  Diagnostic procedures: moderate  Management options: moderate        Disposition  Final diagnoses:   Closed fracture of right ankle, initial encounter   Pain and swelling of right ankle   Fall from standing, initial encounter     Time reflects when diagnosis was documented in both MDM as applicable and the Disposition within this note     Time User Action Codes Description Comment    12/24/2022  3:51 PM Anirudh Rutherford Add [E59 149P] Closed fracture of right ankle, initial encounter     12/24/2022  3:51 PM Anirudh Rutherford Add [M25 571,  M25 471] Pain and swelling of right ankle     12/24/2022  3:51 PM Anirudh Rutherford Add Eve Little  XXXA] Fall from standing, initial encounter       ED Disposition     ED Disposition   Discharge    Condition   Stable    Date/Time   Sat Dec 24, 2022  3:50 PM    Comment   Abelino Guillen discharge to home/self care                 Follow-up Information     Follow up With Specialties Details Why Contact Info Additional Information    Tushar Cobos, [address] Nurse Practitioner, Family Medicine Schedule an appointment as soon as possible for a visit in 1 week  [facility] [address] [facility]       521 [facility] Orthopedic Surgery Schedule an appointment as soon as possible for a visit in 1 week  36 [facility] [address] [facility], [address] [address] [place], [place], 91 [facility]  Schedule an appointment as soon as possible for a visit in 3 days  [address] [facility]  [phone]           Discharge Medication List as of 12/24/2022  4:13 PM      START taking these medications    Details   oxyCODONE (Roxicodone) 5 immediate release tablet Take 1 tablet (5 mg total) by mouth every 6 (six) hours as needed for moderate pain for up to 10 days Max Daily Amount: 20 mg, Starting Sat 12/24/2022, Until Tue 1/3/2023 at 2359, Normal         CONTINUE these medications which have NOT CHANGED    Details   Iron-Vitamin C (Vitron-C)  MG TABS Take 1 tablet by mouth 2 (two) times a day, Starting Tue 1/11/2022, Normal      levothyroxine 50 mcg tablet Take 1 tablet (50 mcg total) by mouth daily, Starting Thu 12/8/2022, Normal      predniSONE 20 mg tablet Take 1 tablet (20 mg total) by mouth daily, Starting Tue 11/15/2022, Normal      sertraline (ZOLOFT) 50 mg tablet Take 1 tablet (50 mg total) by mouth daily, Starting Wed 8/10/2022, Normal                 PDMP Review       Value Time User    PDMP Reviewed  Yes 8/29/2022  1:39 PM Bi Silver           ED Provider  Electronically Signed by           Uma Cardozo MD  12/24/22 [id number]

## 2022-12-26 ENCOUNTER — TELEPHONE (OUTPATIENT)
Dept: OBGYN CLINIC | Facility: CLINIC | Age: 47
End: 2022-12-26

## 2022-12-27 ENCOUNTER — APPOINTMENT (OUTPATIENT)
Dept: LAB | Facility: HOSPITAL | Age: 47
End: 2022-12-27

## 2022-12-27 ENCOUNTER — OFFICE VISIT (OUTPATIENT)
Dept: OBGYN CLINIC | Facility: CLINIC | Age: 47
End: 2022-12-27

## 2022-12-27 ENCOUNTER — LAB (OUTPATIENT)
Dept: LAB | Facility: HOSPITAL | Age: 47
End: 2022-12-27

## 2022-12-27 ENCOUNTER — APPOINTMENT (OUTPATIENT)
Dept: RADIOLOGY | Facility: CLINIC | Age: 47
End: 2022-12-27

## 2022-12-27 ENCOUNTER — OFFICE VISIT (OUTPATIENT)
Dept: LAB | Facility: HOSPITAL | Age: 47
End: 2022-12-27

## 2022-12-27 VITALS
BODY MASS INDEX: 39.27 KG/M2 | DIASTOLIC BLOOD PRESSURE: 109 MMHG | WEIGHT: 230 LBS | SYSTOLIC BLOOD PRESSURE: 162 MMHG | HEART RATE: 76 BPM | HEIGHT: 64 IN

## 2022-12-27 DIAGNOSIS — S82.841A CLOSED BIMALLEOLAR FRACTURE OF RIGHT ANKLE, INITIAL ENCOUNTER: Primary | ICD-10-CM

## 2022-12-27 DIAGNOSIS — E03.9 HYPOTHYROIDISM, UNSPECIFIED TYPE: ICD-10-CM

## 2022-12-27 DIAGNOSIS — S82.841A CLOSED BIMALLEOLAR FRACTURE OF RIGHT ANKLE, INITIAL ENCOUNTER: ICD-10-CM

## 2022-12-27 DIAGNOSIS — S82.891A CLOSED FRACTURE OF RIGHT ANKLE, INITIAL ENCOUNTER: ICD-10-CM

## 2022-12-27 LAB
ANION GAP SERPL CALCULATED.3IONS-SCNC: 8 MMOL/L (ref 4–13)
ATRIAL RATE: 66 BPM
B-HCG SERPL-ACNC: <2 MIU/ML
BASOPHILS # BLD AUTO: 0.06 THOUSANDS/ÂΜL (ref 0–0.1)
BASOPHILS NFR BLD AUTO: 1 % (ref 0–1)
BUN SERPL-MCNC: 12 MG/DL (ref 5–25)
CALCIUM SERPL-MCNC: 8.4 MG/DL (ref 8.3–10.1)
CHLORIDE SERPL-SCNC: 104 MMOL/L (ref 96–108)
CO2 SERPL-SCNC: 28 MMOL/L (ref 21–32)
CREAT SERPL-MCNC: 0.82 MG/DL (ref 0.6–1.3)
EOSINOPHIL # BLD AUTO: 0.42 THOUSAND/ÂΜL (ref 0–0.61)
EOSINOPHIL NFR BLD AUTO: 4 % (ref 0–6)
ERYTHROCYTE [DISTWIDTH] IN BLOOD BY AUTOMATED COUNT: 13.8 % (ref 11.6–15.1)
GFR SERPL CREATININE-BSD FRML MDRD: 84 ML/MIN/1.73SQ M
GLUCOSE SERPL-MCNC: 84 MG/DL (ref 65–140)
HCT VFR BLD AUTO: 41.4 % (ref 34.8–46.1)
HGB BLD-MCNC: 13.2 G/DL (ref 11.5–15.4)
IMM GRANULOCYTES # BLD AUTO: 0.06 THOUSAND/UL (ref 0–0.2)
IMM GRANULOCYTES NFR BLD AUTO: 1 % (ref 0–2)
LYMPHOCYTES # BLD AUTO: 3.31 THOUSANDS/ÂΜL (ref 0.6–4.47)
LYMPHOCYTES NFR BLD AUTO: 30 % (ref 14–44)
MCH RBC QN AUTO: 27.8 PG (ref 26.8–34.3)
MCHC RBC AUTO-ENTMCNC: 31.9 G/DL (ref 31.4–37.4)
MCV RBC AUTO: 87 FL (ref 82–98)
MONOCYTES # BLD AUTO: 1.26 THOUSAND/ÂΜL (ref 0.17–1.22)
MONOCYTES NFR BLD AUTO: 11 % (ref 4–12)
NEUTROPHILS # BLD AUTO: 6.09 THOUSANDS/ÂΜL (ref 1.85–7.62)
NEUTS SEG NFR BLD AUTO: 53 % (ref 43–75)
NRBC BLD AUTO-RTO: 0 /100 WBCS
P AXIS: 27 DEGREES
PLATELET # BLD AUTO: 347 THOUSANDS/UL (ref 149–390)
PMV BLD AUTO: 11.7 FL (ref 8.9–12.7)
POTASSIUM SERPL-SCNC: 4.6 MMOL/L (ref 3.5–5.3)
PR INTERVAL: 138 MS
QRS AXIS: 11 DEGREES
QRSD INTERVAL: 80 MS
QT INTERVAL: 382 MS
QTC INTERVAL: 400 MS
RBC # BLD AUTO: 4.74 MILLION/UL (ref 3.81–5.12)
SODIUM SERPL-SCNC: 140 MMOL/L (ref 135–147)
T WAVE AXIS: 9 DEGREES
TSH SERPL DL<=0.05 MIU/L-ACNC: 6.45 UIU/ML (ref 0.45–4.5)
VENTRICULAR RATE: 66 BPM
WBC # BLD AUTO: 11.2 THOUSAND/UL (ref 4.31–10.16)

## 2022-12-27 RX ORDER — CHLORHEXIDINE GLUCONATE 0.12 MG/ML
15 RINSE ORAL ONCE
Status: CANCELLED | OUTPATIENT
Start: 2022-12-27 | End: 2022-12-27

## 2022-12-27 NOTE — LETTER
December 27, 2022     Patient: Jenaro Trujillo  YOB: 1975  Date of Visit: 12/27/2022      To Whom it May Concern:    Jenaro Trujillo is under my professional care  Mohit Luna was seen in my office on 12/27/2022  Mohit Luna will be undergoing right ankle surgery and is out of work at this time  If you have any questions or concerns, please don't hesitate to call           Sincerely,          Chase Cedillo MD

## 2022-12-27 NOTE — H&P (VIEW-ONLY)
Orthopaedics Office Visit - New Patient Visit    ASSESSMENT/PLAN:    Assessment:   Right ankle fracture, bimalleolar equivalent, DOI 12/24/22 , talar shift and 6 mm of medial clear space widening  MEDIAL TENDERNESS       Plan:   · X-rays with stress view were performed in the office and reviewed   · She was advised to start Aspirin 2x a day for DVT prophylaxis   · ORIF right ankle fracture fixation with all associated procedures was discussed at length including risks and benefits   · Risks of the surgery are inclusive of but not limited to CRPS, bleeding, infection, nerve injury, blood clot, worsening of symptoms, not achieving the anticipated results, persistent stiffness, weakness and the need for additional surgery  The patient verbally stated they understood those risks and would like to proceed with the surgery  · Surgery will be planned for 12/29/22, lab work will be obtained prior to surgical intervention   · She was placed into a posterior splint, which is to be kept clean dry and intact until surgery   · Work note was provided, out of work at this time   · Discussed NWB for aprox  6 weeks post op   · Follow up in the office 2 weeks after surgery for suture removal and right ankle x-rays     To Do Next Visit:  Suture removal, x-ray right ankle     _____________________________________________________  CHIEF COMPLAINT:  Chief Complaint   Patient presents with   • Right Ankle - Pain         SUBJECTIVE:  Jacob Garcia is a 49 y o  female who presents to the office today for a right ankle injury  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, 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  She presented to the ED after injury, at which time x-rays were performed and she was placed into a splint  She has been NWB to her RLE       She works at [employer] as a  as well as [employer]     PAST MEDICAL HISTORY:  Past Medical History:   Diagnosis Date   • Allergic    • GERD (gastroesophageal reflux disease)    • Hypertension    • Varicella        PAST SURGICAL HISTORY:  Past Surgical History:   Procedure Laterality Date   •  SECTION      x2        FAMILY HISTORY:  Family History   Problem Relation Age of Onset   • Esophageal cancer Mother    • Heart disease Father    • No Known Problems Sister    • No Known Problems Daughter    • No Known Problems Maternal Grandmother    • No Known Problems Maternal Grandfather    • No Known Problems Paternal Grandmother    • No Known Problems Paternal Grandfather    • No Known Problems Sister    • No Known Problems Brother    • No Known Problems Brother    • No Known Problems Son    • No Known Problems Maternal Aunt    • No Known Problems Paternal Aunt    • No Known Problems Paternal Aunt    • No Known Problems Paternal Aunt    • No Known Problems Paternal Aunt    • No Known Problems Paternal Aunt        SOCIAL HISTORY:  Social History     Tobacco Use   • Smoking status: Never   • Smokeless tobacco: Never   Vaping Use   • Vaping Use: Never used   Substance Use Topics   • Alcohol use: Not Currently   • Drug use: Never       MEDICATIONS:    Current Outpatient Medications:   •  Iron-Vitamin C (Vitron-C)  MG TABS, Take 1 tablet by mouth 2 (two) times a day, Disp: 60 tablet, Rfl: 3  •  levothyroxine 50 mcg tablet, Take 1 tablet (50 mcg total) by mouth daily, Disp: 90 tablet, Rfl: 1  •  oxyCODONE (Roxicodone) 5 immediate release tablet, Take 1 tablet (5 mg total) by mouth every 6 (six) hours as needed for moderate pain for up to 10 days Max Daily Amount: 20 mg, Disp: 12 tablet, Rfl: 0  •  predniSONE 20 mg tablet, Take 1 tablet (20 mg total) by mouth daily, Disp: 5 tablet, Rfl: 0  •  sertraline (ZOLOFT) 50 mg tablet, Take 1 tablet (50 mg total) by mouth daily, Disp: 90 tablet, Rfl: 5    ALLERGIES:  No Known Allergies    REVIEW OF SYSTEMS:  MSK: as noted in HPI  Neuro: WNL  Pertinent items are otherwise noted in HPI    A comprehensive review of systems was otherwise negative  LABS:  HgA1c: No results found for: HGBA1C  BMP:   Lab Results   Component Value Date    CALCIUM 7 9 (L) 12/30/2021    K 3 8 12/30/2021    CO2 25 12/30/2021     12/30/2021    BUN 6 12/30/2021    CREATININE 0 83 12/30/2021     CBC: No components found for: CBC    _____________________________________________________  PHYSICAL EXAMINATION:  Vital signs: BP (!) 162/109   Pulse 76   Ht 5' 4" (1 626 m)   Wt 104 kg (230 lb)   LMP 12/02/2022   BMI 39 48 kg/m²   General: No acute distress, awake and alert  Psychiatric: Mood and affect appear appropriate  HEENT: Trachea Midline, No torticollis, no apparent facial trauma  Cardiovascular: No audible murmurs; Extremities appear perfused  Pulmonary: No audible wheezing or stridor  Skin: No open lesions; see further details (if any) below    MUSCULOSKELETAL EXAMINATION:    Extremities:  Right ankle     No erythema   Ecchymosis and edema noted   Medial and lateral tenderness   ROM not tested due to fracture   Able to wiggle all toes   Extremity appears warm and well perfused     _____________________________________________________  STUDIES REVIEWED:  I personally reviewed the images and interpretation is as follows:  X-rays of the right ankle with stress view demonstrate bimalleolar equivalent ankle fracture  PROCEDURES PERFORMED:  Splint application    Date/Time: 12/27/2022 11:27 AM  Performed by: Daniel Howell MD  Authorized by: Daniel Howell MD   Universal Protocol:  Consent: Verbal consent obtained  Written consent not obtained  Risks and benefits: risks, benefits and alternatives were discussed  Consent given by: patient  Time out: Immediately prior to procedure a "time out" was called to verify the correct patient, procedure, equipment, support staff and site/side marked as required    Site marked: the operative site was marked  Patient identity confirmed: verbally with patient      Pre-procedure details:     Sensation: Normal  Procedure details:     Laterality:  Right    Location:  Ankle    Ankle:  R ankle    Splint type:  Short leg and sugar tong    Supplies:  Cotton padding, 2 layer wrap, elastic bandage, skin protective strip and Ortho-Glass  Post-procedure details:     Sensation:  Normal    Patient tolerance of procedure:   Tolerated well, no immediate complications        Scribe Attestation    I,:  Angeles Saleem am acting as a scribe while in the presence of the attending physician :       I,:  Sona Miller MD personally performed the services described in this documentation    as scribed in my presence :

## 2022-12-27 NOTE — PROGRESS NOTES
Orthopaedics Office Visit - New Patient Visit    ASSESSMENT/PLAN:    Assessment:   Right ankle fracture, bimalleolar equivalent, DOI 12/24/22 , talar shift and 6 mm of medial clear space widening  MEDIAL TENDERNESS       Plan:   · X-rays with stress view were performed in the office and reviewed   · She was advised to start Aspirin 2x a day for DVT prophylaxis   · ORIF right ankle fracture fixation with all associated procedures was discussed at length including risks and benefits   · Risks of the surgery are inclusive of but not limited to CRPS, bleeding, infection, nerve injury, blood clot, worsening of symptoms, not achieving the anticipated results, persistent stiffness, weakness and the need for additional surgery  The patient verbally stated they understood those risks and would like to proceed with the surgery  · Surgery will be planned for 12/29/22, lab work will be obtained prior to surgical intervention   · She was placed into a posterior splint, which is to be kept clean dry and intact until surgery   · Work note was provided, out of work at this time   · Discussed NWB for aprox  6 weeks post op   · Follow up in the office 2 weeks after surgery for suture removal and right ankle x-rays     To Do Next Visit:  Suture removal, x-ray right ankle     _____________________________________________________  CHIEF COMPLAINT:  Chief Complaint   Patient presents with   • Right Ankle - Pain         SUBJECTIVE:  Maral Norris is a 49 y o  female who presents to the office today for a right ankle injury  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, 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  She presented to the ED after injury, at which time x-rays were performed and she was placed into a splint  She has been NWB to her RLE       She works at [employer] as a  as well as [employer]     PAST MEDICAL HISTORY:  Past Medical History:   Diagnosis Date   • Allergic    • GERD (gastroesophageal reflux disease)    • Hypertension    • Varicella        PAST SURGICAL HISTORY:  Past Surgical History:   Procedure Laterality Date   •  SECTION      x2        FAMILY HISTORY:  Family History   Problem Relation Age of Onset   • Esophageal cancer Mother    • Heart disease Father    • No Known Problems Sister    • No Known Problems Daughter    • No Known Problems Maternal Grandmother    • No Known Problems Maternal Grandfather    • No Known Problems Paternal Grandmother    • No Known Problems Paternal Grandfather    • No Known Problems Sister    • No Known Problems Brother    • No Known Problems Brother    • No Known Problems Son    • No Known Problems Maternal Aunt    • No Known Problems Paternal Aunt    • No Known Problems Paternal Aunt    • No Known Problems Paternal Aunt    • No Known Problems Paternal Aunt    • No Known Problems Paternal Aunt        SOCIAL HISTORY:  Social History     Tobacco Use   • Smoking status: Never   • Smokeless tobacco: Never   Vaping Use   • Vaping Use: Never used   Substance Use Topics   • Alcohol use: Not Currently   • Drug use: Never       MEDICATIONS:    Current Outpatient Medications:   •  Iron-Vitamin C (Vitron-C)  MG TABS, Take 1 tablet by mouth 2 (two) times a day, Disp: 60 tablet, Rfl: 3  •  levothyroxine 50 mcg tablet, Take 1 tablet (50 mcg total) by mouth daily, Disp: 90 tablet, Rfl: 1  •  oxyCODONE (Roxicodone) 5 immediate release tablet, Take 1 tablet (5 mg total) by mouth every 6 (six) hours as needed for moderate pain for up to 10 days Max Daily Amount: 20 mg, Disp: 12 tablet, Rfl: 0  •  predniSONE 20 mg tablet, Take 1 tablet (20 mg total) by mouth daily, Disp: 5 tablet, Rfl: 0  •  sertraline (ZOLOFT) 50 mg tablet, Take 1 tablet (50 mg total) by mouth daily, Disp: 90 tablet, Rfl: 5    ALLERGIES:  No Known Allergies    REVIEW OF SYSTEMS:  MSK: as noted in HPI  Neuro: WNL  Pertinent items are otherwise noted in HPI    A comprehensive review of systems was otherwise negative  LABS:  HgA1c: No results found for: HGBA1C  BMP:   Lab Results   Component Value Date    CALCIUM 7 9 (L) 12/30/2021    K 3 8 12/30/2021    CO2 25 12/30/2021     12/30/2021    BUN 6 12/30/2021    CREATININE 0 83 12/30/2021     CBC: No components found for: CBC    _____________________________________________________  PHYSICAL EXAMINATION:  Vital signs: BP (!) 162/109   Pulse 76   Ht 5' 4" (1 626 m)   Wt 104 kg (230 lb)   LMP 12/02/2022   BMI 39 48 kg/m²   General: No acute distress, awake and alert  Psychiatric: Mood and affect appear appropriate  HEENT: Trachea Midline, No torticollis, no apparent facial trauma  Cardiovascular: No audible murmurs; Extremities appear perfused  Pulmonary: No audible wheezing or stridor  Skin: No open lesions; see further details (if any) below    MUSCULOSKELETAL EXAMINATION:    Extremities:  Right ankle     No erythema   Ecchymosis and edema noted   Medial and lateral tenderness   ROM not tested due to fracture   Able to wiggle all toes   Extremity appears warm and well perfused     _____________________________________________________  STUDIES REVIEWED:  I personally reviewed the images and interpretation is as follows:  X-rays of the right ankle with stress view demonstrate bimalleolar equivalent ankle fracture  PROCEDURES PERFORMED:  Splint application    Date/Time: 12/27/2022 11:27 AM  Performed by: Franki Clark MD  Authorized by: Franki Clark MD   Universal Protocol:  Consent: Verbal consent obtained  Written consent not obtained  Risks and benefits: risks, benefits and alternatives were discussed  Consent given by: patient  Time out: Immediately prior to procedure a "time out" was called to verify the correct patient, procedure, equipment, support staff and site/side marked as required    Site marked: the operative site was marked  Patient identity confirmed: verbally with patient      Pre-procedure details:     Sensation: Normal  Procedure details:     Laterality:  Right    Location:  Ankle    Ankle:  R ankle    Splint type:  Short leg and sugar tong    Supplies:  Cotton padding, 2 layer wrap, elastic bandage, skin protective strip and Ortho-Glass  Post-procedure details:     Sensation:  Normal    Patient tolerance of procedure:   Tolerated well, no immediate complications        Scribe Attestation    I,:  Samantha Saleem am acting as a scribe while in the presence of the attending physician :       I,:  Colby Avila MD personally performed the services described in this documentation    as scribed in my presence :

## 2022-12-28 ENCOUNTER — ANESTHESIA EVENT (OUTPATIENT)
Dept: PERIOP | Facility: HOSPITAL | Age: 47
End: 2022-12-28

## 2022-12-28 DIAGNOSIS — E03.9 HYPOTHYROIDISM, UNSPECIFIED TYPE: ICD-10-CM

## 2022-12-28 LAB — T4 FREE SERPL-MCNC: 0.67 NG/DL (ref 0.76–1.46)

## 2022-12-28 RX ORDER — DIPHENOXYLATE HYDROCHLORIDE AND ATROPINE SULFATE 2.5; .025 MG/1; MG/1
1 TABLET ORAL DAILY
COMMUNITY

## 2022-12-28 RX ORDER — LEVOTHYROXINE SODIUM 0.05 MG/1
50 TABLET ORAL DAILY
Qty: 90 TABLET | Refills: 1 | Status: SHIPPED | OUTPATIENT
Start: 2022-12-28 | End: 2023-01-26 | Stop reason: SDUPTHER

## 2022-12-28 NOTE — PRE-PROCEDURE INSTRUCTIONS
Pre-Surgery Instructions:   Medication Instructions   • levothyroxine 50 mcg tablet Take day of surgery  • multivitamin (THERAGRAN) TABS hold today and DOS   • oxyCODONE (Roxicodone) 5 immediate release tablet Uses PRN- OK to take day of surgery   • sertraline (ZOLOFT) 50 mg tablet Take day of surgery  • VITAMIN D PO hold today and DOS   Preop bathing and medication instructions reviewed with pt via telephone call  Pt verbalizes understanding  Pt states she has a "stuffy nose"  Denies fever or cough  Pt is instructed to call surgeon's office asap with fever or cough  Instructed pt no alcohol, drugs or smoking 24 hrs prior to surgery  No vitamins or supplements (not ordered by a physician)  prior to surgery  No NSAIDS  days prior to surgery  Tylenol is OK to use  NPO after midnight the night prior to surgery  The hospital will call the pt with time and instructions one business day prior to surgery  Pt verbalizes understanding and all questions/concerns addressed

## 2022-12-28 NOTE — ANESTHESIA PREPROCEDURE EVALUATION
Procedure:  OPEN REDUCTION W/ INTERNAL FIXATION (ORIF) RIGHT ANKLE BIMALL EQUIV FRACTURE (Right: Ankle)    Relevant Problems   ENDO   (+) Hypothyroidism      MUSCULOSKELETAL   (+) Chronic bilateral low back pain with sciatica      NEURO/PSYCH   (+) Anxiety and depression   (+) Chronic bilateral low back pain with sciatica      PULMONARY   (+) Acute nasopharyngitis   (+) LESTER (obstructive sleep apnea)      Other   (+) Adjustment disorder with mixed anxiety and depressed mood      BMI 39  GERD  Bimalleolar fracture of ankle      Physical Exam    Airway    Mallampati score: III  TM Distance: >3 FB  Neck ROM: full     Dental   No notable dental hx     Cardiovascular      Pulmonary      Other Findings        Anesthesia Plan  ASA Score- 3     Anesthesia Type- general with ASA Monitors  Additional Monitors:   Airway Plan: ETT  Comment: GA with LMA/ETT, IV, antiemetics  Right popliteal/adductor canal blocks for postop pain control  Plan Factors-    Chart reviewed  Patient summary reviewed  Patient is not a current smoker  Patient did not smoke on day of surgery  Induction- intravenous  Postoperative Plan-   Planned trial extubation    Informed Consent- Anesthetic plan and risks discussed with patient  I personally reviewed this patient with the CRNA  Discussed and agreed on the Anesthesia Plan with the CRNA  Arabella Recinos

## 2022-12-28 NOTE — RESULT ENCOUNTER NOTE
Left detailed message for patient informing her that TSH is improving and she should continue taking medication   Refill was sent to the pharmacy

## 2022-12-29 ENCOUNTER — HOSPITAL ENCOUNTER (OUTPATIENT)
Facility: HOSPITAL | Age: 47
Setting detail: OUTPATIENT SURGERY
Discharge: HOME | End: 2022-12-29
Attending: ORTHOPAEDIC SURGERY | Admitting: ORTHOPAEDIC SURGERY

## 2022-12-29 ENCOUNTER — ANESTHESIA (OUTPATIENT)
Dept: PERIOP | Facility: HOSPITAL | Age: 47
End: 2022-12-29

## 2022-12-29 ENCOUNTER — HOSPITAL ENCOUNTER (OUTPATIENT)
Dept: RADIOLOGY | Facility: HOSPITAL | Age: 47
Discharge: HOME | End: 2022-12-29

## 2022-12-29 VITALS
BODY MASS INDEX: 39.27 KG/M2 | TEMPERATURE: 97.3 F | DIASTOLIC BLOOD PRESSURE: 83 MMHG | HEART RATE: 89 BPM | WEIGHT: 230 LBS | RESPIRATION RATE: 15 BRPM | OXYGEN SATURATION: 98 % | SYSTOLIC BLOOD PRESSURE: 158 MMHG | HEIGHT: 64 IN

## 2022-12-29 DIAGNOSIS — S82.891A CLOSED FRACTURE OF RIGHT ANKLE, INITIAL ENCOUNTER: Primary | ICD-10-CM

## 2022-12-29 DIAGNOSIS — S82.891G CLOSED FRACTURE OF RIGHT ANKLE WITH DELAYED HEALING: ICD-10-CM

## 2022-12-29 LAB
EXT PREGNANCY TEST URINE: NEGATIVE
EXT. CONTROL: NORMAL

## 2022-12-29 DEVICE — 3.5MM CORTEX SCREW SELF-TAPPING 14MM: Type: IMPLANTABLE DEVICE | Site: ANKLE | Status: FUNCTIONAL

## 2022-12-29 DEVICE — LCP ONE-THIRD TUBULAR PLATE WITH COLLAR 7 HOLES/81MM
Type: IMPLANTABLE DEVICE | Site: ANKLE | Status: FUNCTIONAL
Brand: LCP

## 2022-12-29 DEVICE — 1.6MM KIRSCHNER WIRE WITH 5MM THREAD-TROCAR POINT 150MM: Type: IMPLANTABLE DEVICE | Site: ANKLE | Status: FUNCTIONAL

## 2022-12-29 DEVICE — 3.5MM LOCKING SCREW SLF-TPNG W/STARDRIVE(TM) RECESS 14MM: Type: IMPLANTABLE DEVICE | Site: ANKLE | Status: FUNCTIONAL

## 2022-12-29 DEVICE — 3.5MM CORTEX SCREW SELF-TAPPING 16MM: Type: IMPLANTABLE DEVICE | Site: ANKLE | Status: FUNCTIONAL

## 2022-12-29 DEVICE — 3.5MM LOCKING SCREW SLF-TPNG W/STARDRIVE(TM) RECESS 10MM: Type: IMPLANTABLE DEVICE | Site: ANKLE | Status: FUNCTIONAL

## 2022-12-29 DEVICE — 2.7MM CORTEX SCREW SELF-TAPPING 20MM: Type: IMPLANTABLE DEVICE | Site: ANKLE | Status: FUNCTIONAL

## 2022-12-29 RX ORDER — CEFAZOLIN SODIUM 2 G/50ML
2000 SOLUTION INTRAVENOUS ONCE
Status: COMPLETED | OUTPATIENT
Start: 2022-12-29 | End: 2022-12-29

## 2022-12-29 RX ORDER — HYDROMORPHONE HCL 110MG/55ML
PATIENT CONTROLLED ANALGESIA SYRINGE INTRAVENOUS AS NEEDED
Status: DISCONTINUED | OUTPATIENT
Start: 2022-12-29 | End: 2022-12-29

## 2022-12-29 RX ORDER — OXYCODONE HYDROCHLORIDE 5 MG/1
5 TABLET ORAL ONCE
Status: COMPLETED | OUTPATIENT
Start: 2022-12-29 | End: 2022-12-29

## 2022-12-29 RX ORDER — TRAMADOL HYDROCHLORIDE 50 MG/1
50 TABLET ORAL EVERY 6 HOURS SCHEDULED
Status: DISCONTINUED | OUTPATIENT
Start: 2022-12-29 | End: 2022-12-29 | Stop reason: HOSPADM

## 2022-12-29 RX ORDER — KETAMINE HCL IN NACL, ISO-OSM 100MG/10ML
SYRINGE (ML) INJECTION AS NEEDED
Status: DISCONTINUED | OUTPATIENT
Start: 2022-12-29 | End: 2022-12-29

## 2022-12-29 RX ORDER — SODIUM CHLORIDE, SODIUM LACTATE, POTASSIUM CHLORIDE, CALCIUM CHLORIDE 600; 310; 30; 20 MG/100ML; MG/100ML; MG/100ML; MG/100ML
125 INJECTION, SOLUTION INTRAVENOUS CONTINUOUS
Status: DISCONTINUED | OUTPATIENT
Start: 2022-12-29 | End: 2022-12-29 | Stop reason: HOSPADM

## 2022-12-29 RX ORDER — FENTANYL CITRATE 50 UG/ML
INJECTION, SOLUTION INTRAMUSCULAR; INTRAVENOUS AS NEEDED
Status: DISCONTINUED | OUTPATIENT
Start: 2022-12-29 | End: 2022-12-29

## 2022-12-29 RX ORDER — ACETAMINOPHEN 325 MG/1
650 TABLET ORAL EVERY 6 HOURS PRN
Status: DISCONTINUED | OUTPATIENT
Start: 2022-12-29 | End: 2022-12-29 | Stop reason: HOSPADM

## 2022-12-29 RX ORDER — GLYCOPYRROLATE 0.2 MG/ML
INJECTION INTRAMUSCULAR; INTRAVENOUS AS NEEDED
Status: DISCONTINUED | OUTPATIENT
Start: 2022-12-29 | End: 2022-12-29

## 2022-12-29 RX ORDER — ONDANSETRON 2 MG/ML
4 INJECTION INTRAMUSCULAR; INTRAVENOUS EVERY 6 HOURS PRN
Status: DISCONTINUED | OUTPATIENT
Start: 2022-12-29 | End: 2022-12-29 | Stop reason: HOSPADM

## 2022-12-29 RX ORDER — PROPOFOL 10 MG/ML
INJECTION, EMULSION INTRAVENOUS AS NEEDED
Status: DISCONTINUED | OUTPATIENT
Start: 2022-12-29 | End: 2022-12-29

## 2022-12-29 RX ORDER — HYDROMORPHONE HCL IN WATER/PF 6 MG/30 ML
0.2 PATIENT CONTROLLED ANALGESIA SYRINGE INTRAVENOUS
Status: DISCONTINUED | OUTPATIENT
Start: 2022-12-29 | End: 2022-12-29 | Stop reason: HOSPADM

## 2022-12-29 RX ORDER — ONDANSETRON 2 MG/ML
4 INJECTION INTRAMUSCULAR; INTRAVENOUS ONCE
Status: DISCONTINUED | OUTPATIENT
Start: 2022-12-29 | End: 2022-12-29 | Stop reason: HOSPADM

## 2022-12-29 RX ORDER — DEXAMETHASONE SODIUM PHOSPHATE 10 MG/ML
INJECTION, SOLUTION INTRAMUSCULAR; INTRAVENOUS AS NEEDED
Status: DISCONTINUED | OUTPATIENT
Start: 2022-12-29 | End: 2022-12-29

## 2022-12-29 RX ORDER — LIDOCAINE HYDROCHLORIDE 10 MG/ML
INJECTION, SOLUTION EPIDURAL; INFILTRATION; INTRACAUDAL; PERINEURAL AS NEEDED
Status: DISCONTINUED | OUTPATIENT
Start: 2022-12-29 | End: 2022-12-29

## 2022-12-29 RX ORDER — MIDAZOLAM HYDROCHLORIDE 2 MG/2ML
INJECTION, SOLUTION INTRAMUSCULAR; INTRAVENOUS AS NEEDED
Status: DISCONTINUED | OUTPATIENT
Start: 2022-12-29 | End: 2022-12-29

## 2022-12-29 RX ORDER — CHLORHEXIDINE GLUCONATE 0.12 MG/ML
15 RINSE ORAL ONCE
Status: DISCONTINUED | OUTPATIENT
Start: 2022-12-29 | End: 2022-12-29 | Stop reason: HOSPADM

## 2022-12-29 RX ORDER — FENTANYL CITRATE/PF 50 MCG/ML
25 SYRINGE (ML) INJECTION
Status: DISCONTINUED | OUTPATIENT
Start: 2022-12-29 | End: 2022-12-29 | Stop reason: HOSPADM

## 2022-12-29 RX ORDER — NEOSTIGMINE METHYLSULFATE 1 MG/ML
INJECTION INTRAVENOUS AS NEEDED
Status: DISCONTINUED | OUTPATIENT
Start: 2022-12-29 | End: 2022-12-29

## 2022-12-29 RX ORDER — SODIUM CHLORIDE, SODIUM LACTATE, POTASSIUM CHLORIDE, CALCIUM CHLORIDE 600; 310; 30; 20 MG/100ML; MG/100ML; MG/100ML; MG/100ML
100 INJECTION, SOLUTION INTRAVENOUS CONTINUOUS
Status: CANCELLED | OUTPATIENT
Start: 2022-12-29

## 2022-12-29 RX ORDER — ASPIRIN 81 MG/1
81 TABLET ORAL EVERY 12 HOURS
Qty: 84 TABLET | Refills: 0 | Status: SHIPPED | OUTPATIENT
Start: 2022-12-29 | End: 2023-02-09

## 2022-12-29 RX ORDER — ROCURONIUM BROMIDE 10 MG/ML
INJECTION, SOLUTION INTRAVENOUS AS NEEDED
Status: DISCONTINUED | OUTPATIENT
Start: 2022-12-29 | End: 2022-12-29

## 2022-12-29 RX ORDER — ONDANSETRON 2 MG/ML
INJECTION INTRAMUSCULAR; INTRAVENOUS AS NEEDED
Status: DISCONTINUED | OUTPATIENT
Start: 2022-12-29 | End: 2022-12-29

## 2022-12-29 RX ORDER — OXYCODONE HYDROCHLORIDE 5 MG/1
5 TABLET ORAL EVERY 4 HOURS PRN
Qty: 30 TABLET | Refills: 0 | Status: SHIPPED | OUTPATIENT
Start: 2022-12-29

## 2022-12-29 RX ORDER — EPHEDRINE SULFATE 50 MG/ML
INJECTION INTRAVENOUS AS NEEDED
Status: DISCONTINUED | OUTPATIENT
Start: 2022-12-29 | End: 2022-12-29

## 2022-12-29 RX ADMIN — DEXAMETHASONE SODIUM PHOSPHATE 10 MG: 10 INJECTION, SOLUTION INTRAMUSCULAR; INTRAVENOUS at 13:11

## 2022-12-29 RX ADMIN — MIDAZOLAM 2 MG: 1 INJECTION INTRAMUSCULAR; INTRAVENOUS at 12:53

## 2022-12-29 RX ADMIN — LIDOCAINE HYDROCHLORIDE 50 MG: 10 INJECTION, SOLUTION EPIDURAL; INFILTRATION; INTRACAUDAL; PERINEURAL at 12:57

## 2022-12-29 RX ADMIN — Medication 50 MG: at 13:15

## 2022-12-29 RX ADMIN — ROCURONIUM BROMIDE 50 MG: 50 INJECTION INTRAVENOUS at 12:57

## 2022-12-29 RX ADMIN — SODIUM CHLORIDE, POTASSIUM CHLORIDE, SODIUM LACTATE AND CALCIUM CHLORIDE 125 ML/HR: 600; 310; 30; 20 INJECTION, SOLUTION INTRAVENOUS at 11:41

## 2022-12-29 RX ADMIN — FENTANYL CITRATE 50 MCG: 50 INJECTION INTRAMUSCULAR; INTRAVENOUS at 13:33

## 2022-12-29 RX ADMIN — OXYCODONE HYDROCHLORIDE 5 MG: 5 TABLET ORAL at 18:50

## 2022-12-29 RX ADMIN — EPHEDRINE SULFATE 10 MG: 50 INJECTION INTRAVENOUS at 14:32

## 2022-12-29 RX ADMIN — PROPOFOL 200 MG: 10 INJECTION, EMULSION INTRAVENOUS at 12:57

## 2022-12-29 RX ADMIN — FENTANYL CITRATE 50 MCG: 50 INJECTION INTRAMUSCULAR; INTRAVENOUS at 12:57

## 2022-12-29 RX ADMIN — EPHEDRINE SULFATE 5 MG: 50 INJECTION INTRAVENOUS at 14:08

## 2022-12-29 RX ADMIN — HYDROMORPHONE HYDROCHLORIDE 0.5 MG: 2 INJECTION, SOLUTION INTRAMUSCULAR; INTRAVENOUS; SUBCUTANEOUS at 14:27

## 2022-12-29 RX ADMIN — NEOSTIGMINE METHYLSULFATE 2 MG: 1 INJECTION INTRAVENOUS at 14:20

## 2022-12-29 RX ADMIN — TRAMADOL HYDROCHLORIDE 50 MG: 50 TABLET, COATED ORAL at 17:44

## 2022-12-29 RX ADMIN — GLYCOPYRROLATE 0.2 MG: 0.2 INJECTION, SOLUTION INTRAMUSCULAR; INTRAVENOUS at 14:20

## 2022-12-29 RX ADMIN — CEFAZOLIN SODIUM 2000 MG: 2 SOLUTION INTRAVENOUS at 12:59

## 2022-12-29 RX ADMIN — ONDANSETRON 4 MG: 2 INJECTION INTRAMUSCULAR; INTRAVENOUS at 14:07

## 2022-12-29 NOTE — DISCHARGE INSTR - AVS FIRST PAGE
Discharge Instructions - Orthopedics  Marvin Givens 49 y o  female MRN: [id number]  Unit/Bed#: PACU 04    Weight Bearing Status:                                           Non-weight bearing right lower extremity in splint at all times     DVT prophylaxis  Aspirin 81mg twice daily x 6 weeks in duration     Pain:  Continue analgesics as directed    Dressing Instructions:   Please keep clean, dry and intact until follow up  Please keep splint dry at all times  Contact office if splint becomes wet or damaged      Appt Instructions: If you do not have your appointment, please call the clinic at [phone] t  Otherwise followup as scheduled     Contact the office sooner if you experience any increased numbness/tingling in the extremities

## 2022-12-29 NOTE — ANESTHESIA POSTPROCEDURE EVALUATION
Post-Op Assessment Note    CV Status:  Stable    Pain management: adequate     Mental Status:  Sleepy   Hydration Status:  Euvolemic   PONV Controlled:  Controlled   Airway Patency:  Patent      Post Op Vitals Reviewed: Yes      Staff: CRNA   Comments: VSS report RN        No notable events documented      /67 (12/29/22 1454)    Temp 99 1 °F (37 3 °C) (12/29/22 1454)    Pulse 76 (12/29/22 1454)   Resp 16 (12/29/22 1454)    SpO2 95 % (12/29/22 1454)

## 2022-12-29 NOTE — OP NOTE
OPERATIVE REPORT  PATIENT NAME: Matt Ramirez    :  1975  MRN: [id number]  Pt Location:  OR ROOM 04    SURGERY DATE: 2022    Surgeon(s) and Role:     * Jairo Estrada MD - Primary     * Corona Jones PA-C - Ricky Patino MD - Assisting    Preop Diagnosis:  Right ankle bimalleolar equivalent fracture    Post-Op Diagnosis Codes:     * Closed fracture of right ankle, initial encounter [P42 973B]    Same    Procedure(s) (LRB):  OPEN REDUCTION W/ INTERNAL FIXATION (ORIF) RIGHT ANKLE BIMALL EQUIV FRACTURE (Right)    Procedure:  ORIF R ankle bimalleolar equivalent fracture    Specimen(s):  * No specimens in log *    Estimated Blood Loss:   10 cc    Drains:  * No LDAs found *    Anesthesia Type:   General    Operative Indications:  Displaced R ankle fracture with talar shift and medial clear space widening    Operative Findings:  See below    Complications:   None    Implants: Synthes 2 7 mm cortical lag screw x1, JoyTunes 7 hl 1/3 tubular plate    Procedure and Technique:  The patient's operative site, laterality ,procedure, consent were verified in the preop area and the patient was transitioned to the OR  General anesthesia was induced and a nonsterile tourniquet was applied to the operative extremity and used for less than 120 total minutes; the operative extremity was prepped/draped in usual sterile fashion  After timeout, lateral approach to the fibula took place  Peroneal structures were protected  The fracture was identified and cleared of callus and hematoma  Reduction clamp was used and the fracture was reduced and held with wire  Drilling took place and a 2 7 mm cortical lag screw was placed in standard fashion  The 1/3 tubular plate was applied to the bone and held in place with wires  Balanced fixation took place with cortical screws and lag screws   The ankle was taken through a dorsiflexion and ER stress test which revealed no medial clear space widening and no loss of tibiofibular overlap  The wound was irrigated with sterile saline  Deep layer closure took place with 0 vicryl suture, subcu layer closed with vicryl suture, skin layer closed with nylon suture  Sterile dressing was applied  A well padded posterior plaster splint with stirrup was placed  Final counts were correct  I was present for the entire procedure  The patient was extubated and awakened and taken to PACU in stable condition  There were no immediate complications  The patient will be NWB in splint  She will continue ASA for dvt ppx  We will consider suture removal in 2 wks      Patient Disposition:  PACU         SIGNATURE: Gloria Wesley MD  DATE: December 29, 2022  TIME: 3:04 PM

## 2022-12-29 NOTE — INTERVAL H&P NOTE
H&P reviewed  After examining the patient I find no changes in the patients condition since the H&P had been written      Abd: Soft, present    Vitals:    12/29/22 1118   BP: 157/83   Pulse: 80   Resp: 16   Temp: 97 7 °F (36 5 °C)   SpO2: 97%

## 2023-01-04 ENCOUNTER — TELEPHONE (OUTPATIENT)
Dept: OBGYN CLINIC | Facility: MEDICAL CENTER | Age: 48
End: 2023-01-04

## 2023-01-04 NOTE — TELEPHONE ENCOUNTER
Caller: Patient    Doctor: Em Quintero    Reason for call:     Patient is calling on her disability paperwork, Saqib Ramsay, she is providing the fax number to fax paperwork to  [phone]      Call back#: [phone]

## 2023-01-06 ENCOUNTER — TELEPHONE (OUTPATIENT)
Dept: FAMILY MEDICINE CLINIC | Facility: CLINIC | Age: 48
End: 2023-01-06

## 2023-01-06 DIAGNOSIS — S82.891A CLOSED FRACTURE OF RIGHT ANKLE, INITIAL ENCOUNTER: Primary | ICD-10-CM

## 2023-01-06 NOTE — TELEPHONE ENCOUNTER
Caller: Jessy     Doctor: Andres Mack    Reason for call: Patient called and said that Naina Oseguera did not receive the paperwork   She is wondering if it can please be re faxed fax # [phone]    Call back#: [phone]

## 2023-01-09 ENCOUNTER — TELEMEDICINE (OUTPATIENT)
Dept: BEHAVIORAL/MENTAL HEALTH CLINIC | Facility: CLINIC | Age: 48
End: 2023-01-09

## 2023-01-09 DIAGNOSIS — F43.23 ADJUSTMENT DISORDER WITH MIXED ANXIETY AND DEPRESSED MOOD: Primary | ICD-10-CM

## 2023-01-09 DIAGNOSIS — Z63.8 FAMILY CONFLICT: ICD-10-CM

## 2023-01-09 SDOH — SOCIAL STABILITY - SOCIAL INSECURITY: OTHER SPECIFIED PROBLEMS RELATED TO PRIMARY SUPPORT GROUP: Z63.8

## 2023-01-09 NOTE — PSYCH
This note was not shared with the patient due to this is a psychotherapy note        Virtual Regular Visit    Verification of patient location:    Patient is located in the following state in which I hold an active license [place]      Assessment/Plan:    Problem List Items Addressed This Visit        Other    Adjustment disorder with mixed anxiety and depressed mood - Primary    Family conflict       Goals addressed in session: Goal 1          Reason for visit is   Chief Complaint   Patient presents with   • Virtual Regular Visit        Encounter provider [facility]    Provider located at [address] [place] [id number] [phone]    The patient was identified by name and date of birth  Namrata Summers was informed that this is a telemedicine visit and that the visit is being conducted throughthe [employer]  She agrees to proceed     My office door was closed  No one else was in the room  She acknowledged consent and understanding of privacy and security of the video platform  The patient has agreed to participate and understands they can discontinue the visit at any time  Patient is aware this is a billable service  Subjective  Namrata Summers is a 49 y o  female   Psychotherapy Provided: Individual Psychotherapy 32 minutes     Length of time in session: 32 minutes, follow up in 2 week    Goals addressed in session: Goal 1     Pain:      none    0    Current suicide risk : Low     Met with Jessy  Since last session, Jayden Gerardo fell at work and broke her ankle  She is currently out of work until further notice  Her  has been supportive  Jayden Gerardo spoke to her father about some of her stressors back in [facility]  Per Jayden Gerardo, her father was very supportive of her which has been helpful to her as he remains in [facility]     Jayden Gerardo remains interested in marriage counseling with her --to work on past unresolved issues and improving communication with one another as they transition to living together after having a long distance relationship for several years  Today, she asks for an update on the referral       Jeffrey Phillips presents with depressed mood, affect constricted  Thought process is coherent, organized, and goal directed  She appears to be in the action stage of change  Solution focus therapy was utilized  Jeffrey Phillips remains on the wait list for marriage counseling through [facility]  This clinician followed up on the referral, no change  Jeffrey Phillips was provided the name of a marriage counselor in her area, she was also encouraged to call her insurance company for the names of other providers in network  Treatment goals were updated  Jeffrey Phillips wishes to follow-up in 2 weeks, with hopes to have appt for marriage counseling scheduled and ongoing support until marriage counseling begins  Continue biweekly support  Behavioral Health Treatment Plan [facility]: Diagnosis and Treatment Plan explained to Susan Weinstein relates understanding diagnosis and is agreeable to Treatment Plan   Yes     Visit start and stop times:    01/09/23  Start Time: 1600  Stop Time: 5219  Total Visit Time: 32 minutes

## 2023-01-10 ENCOUNTER — OFFICE VISIT (OUTPATIENT)
Dept: OBGYN CLINIC | Facility: CLINIC | Age: 48
End: 2023-01-10

## 2023-01-10 ENCOUNTER — APPOINTMENT (OUTPATIENT)
Dept: RADIOLOGY | Facility: CLINIC | Age: 48
End: 2023-01-10

## 2023-01-10 VITALS
WEIGHT: 230 LBS | DIASTOLIC BLOOD PRESSURE: 86 MMHG | SYSTOLIC BLOOD PRESSURE: 138 MMHG | BODY MASS INDEX: 39.27 KG/M2 | HEIGHT: 64 IN | HEART RATE: 78 BPM

## 2023-01-10 DIAGNOSIS — S82.841A CLOSED BIMALLEOLAR FRACTURE OF RIGHT ANKLE, INITIAL ENCOUNTER: Primary | ICD-10-CM

## 2023-01-10 DIAGNOSIS — S82.891A CLOSED FRACTURE OF RIGHT ANKLE, INITIAL ENCOUNTER: ICD-10-CM

## 2023-01-10 LAB

## 2023-01-10 NOTE — PROGRESS NOTES
Orthopaedics Office Visit - Post-op Patient Visit    ASSESSMENT/PLAN:    Assessment:   12 days s/p ORIF right ankle fracture, bimalleolar equivalent    DOS  12/29/22    Doing well, appropriate postop stiffness      Plan:   - Nonweightbearing right lower extremity  - Sutures removed in the office  Patient tolerated well    - Okay To begin showering  Allow water to flow over the incision sites  Do not vigorously scrubbed or soak wounds until otherwise stated   - Continue to take Aspirin 81mg twice daily until otherwise stated   - Begin physical therapy as directed   - Podous boot provided for patient  Maintain as directed   - Over the counter analgesics as needed / directed   - Ice / heat as directed   - Follow up 4 weeks with repeat XR       To Do Next Visit:  XR right ankle     _____________________________________________________  CHIEF COMPLAINT:  Chief Complaint   Patient presents with   • Right Ankle - Post-op         SUBJECTIVE:  Patria Thomas is a 49 y o  female who presents  12 days s/p ORIF right ankle fracture, bimalleolar equivalent    DOS  12/29/22   Patient states that she has minimal pain in her ankle currently  Patient states that she has been maintaining her ankle splint as directed with no complaints  Patient denies any new or worsening symptoms in the leg  Patient states that she has been taking her aspirin 81 mg twice daily  Patient denies any numbness or tingling in her legs  Patient offers no other complaints at this time      SOCIAL HISTORY:  Social History     Tobacco Use   • Smoking status: Never   • Smokeless tobacco: Never   Vaping Use   • Vaping Use: Never used   Substance Use Topics   • Alcohol use: Not Currently   • Drug use: Never       MEDICATIONS:    Current Outpatient Medications:   •  aspirin (ECOTRIN LOW STRENGTH) 81 mg EC tablet, Take 1 tablet (81 mg total) by mouth every 12 (twelve) hours, Disp: 84 tablet, Rfl: 0  •  Iron-Vitamin C (Vitron-C)  MG TABS, Take 1 tablet by mouth 2 (two) times a day, Disp: 60 tablet, Rfl: 3  •  levothyroxine 50 mcg tablet, Take 1 tablet (50 mcg total) by mouth daily, Disp: 90 tablet, Rfl: 1  •  multivitamin (THERAGRAN) TABS, Take 1 tablet by mouth daily, Disp: , Rfl:   •  oxyCODONE (Roxicodone) 5 immediate release tablet, Take 1 tablet (5 mg total) by mouth every 4 (four) hours as needed for moderate pain Max Daily Amount: 30 mg, Disp: 30 tablet, Rfl: 0  •  sertraline (ZOLOFT) 50 mg tablet, Take 1 tablet (50 mg total) by mouth daily, Disp: 90 tablet, Rfl: 5  •  VITAMIN D PO, Take by mouth, Disp: , Rfl:   •  predniSONE 20 mg tablet, Take 1 tablet (20 mg total) by mouth daily (Patient not taking: Reported on 12/28/2022), Disp: 5 tablet, Rfl: 0    REVIEW OF SYSTEMS:  MSK: right ankle pain   Neuro: WNL   Pertinent items are otherwise noted in HPI  A comprehensive review of systems was otherwise negative     _____________________________________________________  PHYSICAL EXAMINATION:  Vital signs: /86   Pulse 78   Ht 5' 4" (1 626 m)   Wt 104 kg (230 lb)   BMI 39 48 kg/m²   General: No acute distress, awake and alert  Psychiatric: Mood and affect appear appropriate  HEENT: Trachea Midline, No torticollis, no apparent facial trauma  Cardiovascular: No audible murmurs; Extremities appear perfused  Pulmonary: No audible wheezing or stridor  Skin: No open lesions; see further details (if any) below      MUSCULOSKELETAL EXAMINATION:  Right ankle examination:  - Patient sitting comfortably in the office in no acute distress   -Healed incision site noted over the lateral aspect of the ankle with no other acute visible abnormalities present  -Mild tender palpation noted over incision site    No other bony or soft tissue tenderness palpation noted at this time  -Limited range of motion of the ankle in all planes of motion secondary to pain  - NV intact      _____________________________________________________  STUDIES REVIEWED:  I personally reviewed the images and interpretation is as follows:  Right ankle x-ray 3 views:  Healing distal fibula fracture in acceptable alignment with hardware intact      PROCEDURES PERFORMED:  Suture removal    Date/Time: 1/10/2023 4:41 PM  Performed by: Julia Martin PA-C  Authorized by: Doug Myers MD   [place] Protocol:  Consent: Verbal consent obtained  Risks and benefits: risks, benefits and alternatives were discussed  Consent given by: patient  Patient understanding: patient states understanding of the procedure being performed  Site marked: the operative site was marked  Patient identity confirmed: verbally with patient        Patient location:  Bedside  Location:     Laterality:  Right    Location:  Lower extremity    Lower extremity location:  Ankle    Ankle location:  R ankle  Procedure details: Tools used:  Suture removal kit    Wound appearance:  No sign(s) of infection, good wound healing and clean  Post-procedure details:     Post-removal:  Steri-Strips applied    Patient tolerance of procedure: Tolerated well, no immediate complications             Digna Aguilar PA-C - assisting  Eliana Galeana                Portions of the record may have been created with voice recognition software  Occasional wrong word or "sound a like" substitutions may have occurred due to the inherent limitations of voice recognition software  Read the chart carefully and recognize, using context, where substitutions have occurred

## 2023-01-10 NOTE — PATIENT INSTRUCTIONS
- Nonweightbearing right lower extremity  - Sutures removed in the office  Patient tolerated well    - Okay To begin showering  Allow water to flow over the incision sites  Do not vigorously scrubbed or soak wounds until otherwise stated   - Continue to take Aspirin 81mg twice daily until otherwise stated   - Begin physical therapy as directed   - Podous boot provided for patient   Maintain as directed   - Over the counter analgesics as needed / directed   - Ice / heat as directed   - Follow up 4 weeks with repeat XR

## 2023-01-13 ENCOUNTER — TELEPHONE (OUTPATIENT)
Dept: PULMONOLOGY | Facility: CLINIC | Age: 48
End: 2023-01-13

## 2023-01-23 ENCOUNTER — TELEMEDICINE (OUTPATIENT)
Dept: BEHAVIORAL/MENTAL HEALTH CLINIC | Facility: CLINIC | Age: 48
End: 2023-01-23

## 2023-01-23 DIAGNOSIS — Z63.8 FAMILY CONFLICT: ICD-10-CM

## 2023-01-23 DIAGNOSIS — F43.23 ADJUSTMENT DISORDER WITH MIXED ANXIETY AND DEPRESSED MOOD: Primary | ICD-10-CM

## 2023-01-23 SDOH — SOCIAL STABILITY - SOCIAL INSECURITY: OTHER SPECIFIED PROBLEMS RELATED TO PRIMARY SUPPORT GROUP: Z63.8

## 2023-01-23 NOTE — PSYCH
This note was not shared with the patient due to this is a psychotherapy note      Virtual Regular Visit    Verification of patient location:    Patient is located in the following state in which I hold an active license [place]      Assessment/Plan:    Problem List Items Addressed This Visit        Other    Adjustment disorder with mixed anxiety and depressed mood - Primary    Family conflict       Goals addressed in session: Goal 1          Reason for visit is   Chief Complaint   Patient presents with   • Virtual Regular Visit        Encounter provider [facility]    Provider located at [address] [place] [id number] [phone]     The patient was identified by name and date of birth  Bernabe Sharma was informed that this is a telemedicine visit and that the visit is being conducted thr[facility]  She agrees to proceed     My office door was closed  No one else was in the room  She acknowledged consent and understanding of privacy and security of the video platform  The patient has agreed to participate and understands they can discontinue the visit at any time  Patient is aware this is a billable service  Subjective  Bernabe Sharma is a 49 y o  female   Behavioral Health Psychotherapy Progress Note    Psychotherapy Provided: Individual Psychotherapy     1  Adjustment disorder with mixed anxiety and depressed mood        2  Family conflict            Goals addressed in session: Goal 1     DATA: Met with Jessy Rice says that she's been 'okay '   Her relationship with her  remains a trigger to her anxiety and depression symptoms  She says they had a huge argument last week  Session focused on Jessy expressing her her thoughts and feelings about this  She voices feelings of frustration and anger  Her  has begun his own individual counseling    José Luis Rice says- "he's struggling, he's stressed out, and he's taking it out on me "   Debbie Bond remains on the wait list for marriage counseling at [facility] ([facility])  Debbie Bond called 2 other places and has added her and her  to the wait list        During this session, this clinician used the following therapeutic modalities: Client-centered Therapy, Solution-Focused Therapy and Supportive Psychotherapy    Substance Abuse was not addressed during this session  If the client is diagnosed with a co-occurring substance use disorder, please indicate any changes in the frequency or amount of use: na  Stage of change for addressing substance use diagnoses: No substance use/Not applicable    ASSESSMENT:  Dinorah Reyna presents with a Anxious and Depressed mood  her affect is Normal range and intensity, which is congruent, with her mood and the content of the session  The client has made progress on their goals  Dinorah Reyna presents with a none risk of suicide, none risk of self-harm, and none risk of harm to others  For any risk assessment that surpasses a "low" rating, a safety plan must be developed  A safety plan was indicated: no  If yes, describe in detail na    PLAN: Between sessions, Dinorah Reyna will practice distress tolerance skills to better manage her emotions when having conflict with her partner  At the next session, the therapist will use Client-centered Therapy and Supportive Psychotherapy to address ongoing marital conflict and improve coping  This clinician will contact  case management department for assistance in finding other marriage counselors in the area for pt and her   Behavioral Health Treatment Plan and Discharge Planning: Dinorah Reyna is aware of and agrees to continue to work on their treatment plan  They have identified and are working toward their discharge goals   yes    Visit start and stop times:    01/23/23  Start Time: [address] [place] [address]  Stop Time: 1645  Total Visit Time: 40 minutes

## 2023-01-25 ENCOUNTER — TELEPHONE (OUTPATIENT)
Dept: PSYCHIATRY | Facility: CLINIC | Age: 48
End: 2023-01-25

## 2023-01-25 NOTE — TELEPHONE ENCOUNTER
CM received message in regards to PT being on therapy waitlist for [facility] but wanting to know other options outside of [facility] for Marriage Counseling  CM sent Patient an e-mail with information

## 2023-01-26 DIAGNOSIS — E03.9 HYPOTHYROIDISM, UNSPECIFIED TYPE: ICD-10-CM

## 2023-01-26 RX ORDER — LEVOTHYROXINE SODIUM 0.05 MG/1
50 TABLET ORAL DAILY
Qty: 90 TABLET | Refills: 1 | Status: SHIPPED | OUTPATIENT
Start: 2023-01-26

## 2023-01-27 ENCOUNTER — TELEPHONE (OUTPATIENT)
Dept: OBGYN CLINIC | Facility: HOSPITAL | Age: 48
End: 2023-01-27

## 2023-01-27 NOTE — TELEPHONE ENCOUNTER
Caller: Alden Burks from [facility]    Doctor/Office: Dr Bo Santa    #: [phone]      What needs to be faxed: Betty Garcai from 1/10/23       ATTN John Ball    Fax#: [phone] [facility] were successfully e-faxed

## 2023-01-30 ENCOUNTER — OFFICE VISIT (OUTPATIENT)
Dept: PULMONOLOGY | Facility: CLINIC | Age: 48
End: 2023-01-30

## 2023-01-30 VITALS
OXYGEN SATURATION: 97 % | WEIGHT: 229.28 LBS | SYSTOLIC BLOOD PRESSURE: 124 MMHG | BODY MASS INDEX: 39.14 KG/M2 | HEART RATE: 86 BPM | HEIGHT: 64 IN | DIASTOLIC BLOOD PRESSURE: 80 MMHG | TEMPERATURE: 98.6 F

## 2023-01-30 DIAGNOSIS — G47.33 OSA (OBSTRUCTIVE SLEEP APNEA): Primary | ICD-10-CM

## 2023-01-30 DIAGNOSIS — G47.19 EXCESSIVE DAYTIME SLEEPINESS: ICD-10-CM

## 2023-01-30 DIAGNOSIS — E66.9 OBESITY (BMI 30-39.9): ICD-10-CM

## 2023-01-30 NOTE — PROGRESS NOTES
Assessment/Plan:   Diagnoses and all orders for this visit:    LESTER (obstructive sleep apnea)    Obesity (BMI 30-39  9)    Excessive daytime sleepiness        Patient with moderate obstructive sleep apnea with an AHI of 20 1 associated with events related hypoxemia for which an auto CPAP was ordered, she states she only received it January 1 week just about 2 weeks ago, and she started using it right away January 9, 2023,  She is able to use it consistently every night for the past 2 weeks with decreased nocturnal awakenings and feels well rested when she is up in the morning  Reviewed CPAP download compliance, although it was sent in for the past 30 days, I did review that from January 9, 2023 to now she has been consistently using it for greater than 4 hours 100% compliance for the past 2 weeks and acceptable residual AHI of 4 4 no evidence of any mask leak  She recently had an ankle injury currently on crutches  Discussed with the patient regarding continuing the current settings cleaning of the supplies and change of CPAP supplies discussed  She is working on weight loss, currently with ankle injury but months that he is she is going to go to the gym she states  Lifestyle changes with dietary changes also discussed  Caution against driving when sleepy  Follow-up in 6 months or as needed earlier as needed  Return in about 6 months (around 7/30/2023)  All questions are answered to the patient's satisfaction and understanding  She verbalizes understanding  She is encouraged to call with any further questions or concerns  Portions of the record may have been created with voice recognition software  Occasional wrong word or "sound a like" substitutions may have occurred due to the inherent limitations of voice recognition software  Read the chart carefully and recognize, using context, where substitutions have occurred      Electronically Signed by Debbie Valdez MD    ______________________________________________________________________    Chief Complaint:   Chief Complaint   Patient presents with   • Follow-up   • Sleep Apnea       Patient ID: Rajan Wesley is a 49 y o  y o  female has a past medical history of Allergic, GERD (gastroesophageal reflux disease), Hypertension, Sleep apnea, Sleep apnea, obstructive, and Varicella  1/30/2023  Patient presents today for follow-up visit  Patient with moderate obstructive sleep apnea on auto CPAP just received it about 2 weeks ago and is here for follow-up has been able to use it consistently with decreased nocturnal awakenings and feels well rested when she is up in the morning  Review of Systems   Constitutional: Negative  HENT: Negative  Eyes: Negative  Respiratory: Negative  Cardiovascular: Negative  Gastrointestinal: Negative  Endocrine: Negative  Genitourinary: Negative  Musculoskeletal: Negative  Ankle pain   Skin: Negative  Allergic/Immunologic: Negative  Neurological: Negative  Hematological: Negative  Psychiatric/Behavioral: Negative  Smoking history: She reports that she has never smoked   She has never used smokeless tobacco     The following portions of the patient's history were reviewed and updated as appropriate: allergies, current medications, past family history, past medical history, past social history, past surgical history and problem list     Immunization History   Administered Date(s) Administered   • COVID-19 PFIZER VACCINE 0 3 ML IM 07/23/2021, 08/13/2021   • COVID-19 Pfizer vac (Gary-sucrose, gray cap) 12 yr+ IM 04/04/2022     Current Outpatient Medications   Medication Sig Dispense Refill   • aspirin (ECOTRIN LOW STRENGTH) 81 mg EC tablet Take 1 tablet (81 mg total) by mouth every 12 (twelve) hours 84 tablet 0   • Iron-Vitamin C (Vitron-C)  MG TABS Take 1 tablet by mouth 2 (two) times a day 60 tablet 3   • levothyroxine 50 mcg tablet Take 1 tablet (50 mcg total) by mouth daily 90 tablet 1   • multivitamin (THERAGRAN) TABS Take 1 tablet by mouth daily     • oxyCODONE (Roxicodone) 5 immediate release tablet Take 1 tablet (5 mg total) by mouth every 4 (four) hours as needed for moderate pain Max Daily Amount: 30 mg 30 tablet 0   • sertraline (ZOLOFT) 50 mg tablet Take 1 tablet (50 mg total) by mouth daily 90 tablet 5   • VITAMIN D PO Take by mouth     • predniSONE 20 mg tablet Take 1 tablet (20 mg total) by mouth daily (Patient not taking: Reported on 12/28/2022) 5 tablet 0     No current facility-administered medications for this visit  Allergies: Patient has no known allergies  Objective:  Vitals:    01/30/23 1558   BP: 124/80   Pulse: 86   Temp: 98 6 °F (37 °C)   SpO2: 97%   Weight: 104 kg (229 lb 4 5 oz)   Height: 5' 4" (1 626 m)   Oxygen Therapy  SpO2: 97 %    Wt Readings from Last 3 Encounters:   01/30/23 104 kg (229 lb 4 5 oz)   01/10/23 104 kg (230 lb)   12/29/22 104 kg (230 lb)     Body mass index is 39 36 kg/m²  Physical Exam  Constitutional:       Appearance: She is well-developed  HENT:      Head: Normocephalic and atraumatic  Mouth/Throat:      Comments: Crowded oropharyngeal airways  Eyes:      Pupils: Pupils are equal, round, and reactive to light  Neck:      Comments: Short and wide neck  Cardiovascular:      Rate and Rhythm: Normal rate and regular rhythm  Heart sounds: Normal heart sounds  Pulmonary:      Effort: Pulmonary effort is normal       Breath sounds: Normal breath sounds  Musculoskeletal:         General: Normal range of motion  Cervical back: Normal range of motion and neck supple  Skin:     General: Skin is warm and dry  Neurological:      Mental Status: She is alert and oriented to person, place, and time  Psychiatric:         Behavior: Behavior normal              Diagnostics:  I have personally reviewed pertinent reports      ESS: Total score: 11  XR ankle 3+ vw right    Result Date: 1/14/2023  Narrative: RIGHT ANKLE INDICATION:   [id number]: Other fracture of right lower leg, initial encounter for closed fracture  COMPARISON:  Compared 12/27/2022 VIEWS:  XR ANKLE 3+ VW RIGHT FINDINGS: Hardware plate and screws fixing the distal fibular fracture  No lucency  Alignment is maintained  Ankle mortise is maintained  Small calcaneal spur and enthesopathy  No lytic or blastic osseous lesion  Soft tissues are unremarkable       Impression: Interval hardware placement with healing distal fibular fracture in alignment Workstation performed: [id number]

## 2023-02-03 DIAGNOSIS — S82.891A CLOSED FRACTURE OF RIGHT ANKLE, INITIAL ENCOUNTER: Primary | ICD-10-CM

## 2023-02-07 ENCOUNTER — OFFICE VISIT (OUTPATIENT)
Dept: OBGYN CLINIC | Facility: CLINIC | Age: 48
End: 2023-02-07

## 2023-02-07 ENCOUNTER — APPOINTMENT (OUTPATIENT)
Dept: RADIOLOGY | Facility: CLINIC | Age: 48
End: 2023-02-07

## 2023-02-07 VITALS
HEART RATE: 78 BPM | SYSTOLIC BLOOD PRESSURE: 122 MMHG | DIASTOLIC BLOOD PRESSURE: 75 MMHG | WEIGHT: 229 LBS | BODY MASS INDEX: 39.09 KG/M2 | HEIGHT: 64 IN

## 2023-02-07 DIAGNOSIS — S82.891A CLOSED FRACTURE OF RIGHT ANKLE, INITIAL ENCOUNTER: ICD-10-CM

## 2023-02-07 DIAGNOSIS — S82.891A CLOSED FRACTURE OF RIGHT ANKLE, INITIAL ENCOUNTER: Primary | ICD-10-CM

## 2023-02-07 NOTE — PROGRESS NOTES
Orthopaedics Office Visit - Post-op Patient Visit    ASSESSMENT/PLAN:    Assessment:   6 wks s/p ORIF right ankle fracture, bimalleolar equivalent    DOS  12/29/22    Doing well, mild restricted ROM but without pain    Mild hardware sensitivity in the cold      Plan:   -WBAT RLE in CAM boot, rpovided today  Start PT for ROM and strengthening and proprioception  Followup 3 weeks to eval for ability to return to work      To Do Next Visit:  XR right ankle     _____________________________________________________  CHIEF COMPLAINT:  Chief Complaint   Patient presents with   • Right Ankle - Post-op         SUBJECTIVE:  Rafa Danielle is a 49 y o  female who presents  6 wk sp ORIF R ankle bimall equiv  Doing well ,complains of some mild pain in cold and sensitivity over the plate but otherwise doing well  She has been comlpiant with NWB status      SOCIAL HISTORY:  Social History     Tobacco Use   • Smoking status: Never   • Smokeless tobacco: Never   Vaping Use   • Vaping Use: Never used   Substance Use Topics   • Alcohol use: Not Currently   • Drug use: Never       MEDICATIONS:    Current Outpatient Medications:   •  aspirin (ECOTRIN LOW STRENGTH) 81 mg EC tablet, Take 1 tablet (81 mg total) by mouth every 12 (twelve) hours, Disp: 84 tablet, Rfl: 0  •  Iron-Vitamin C (Vitron-C)  MG TABS, Take 1 tablet by mouth 2 (two) times a day, Disp: 60 tablet, Rfl: 3  •  levothyroxine 50 mcg tablet, Take 1 tablet (50 mcg total) by mouth daily, Disp: 90 tablet, Rfl: 1  •  multivitamin (THERAGRAN) TABS, Take 1 tablet by mouth daily, Disp: , Rfl:   •  oxyCODONE (Roxicodone) 5 immediate release tablet, Take 1 tablet (5 mg total) by mouth every 4 (four) hours as needed for moderate pain Max Daily Amount: 30 mg, Disp: 30 tablet, Rfl: 0  •  sertraline (ZOLOFT) 50 mg tablet, Take 1 tablet (50 mg total) by mouth daily, Disp: 90 tablet, Rfl: 5  •  VITAMIN D PO, Take by mouth, Disp: , Rfl:   •  predniSONE 20 mg tablet, Take 1 tablet (20 mg total) by mouth daily (Patient not taking: Reported on 12/28/2022), Disp: 5 tablet, Rfl: 0    REVIEW OF SYSTEMS:  MSK: right ankle pain   Neuro: WNL   Pertinent items are otherwise noted in HPI  A comprehensive review of systems was otherwise negative     _____________________________________________________  PHYSICAL EXAMINATION:  Vital signs: /75   Pulse 78   Ht 5' 4" (1 626 m)   Wt 104 kg (229 lb)   BMI 39 31 kg/m²   General: No acute distress, awake and alert  Psychiatric: Mood and affect appear appropriate  HEENT: Trachea Midline, No torticollis, no apparent facial trauma  Cardiovascular: No audible murmurs; Extremities appear perfused  Pulmonary: No audible wheezing or stridor  Skin: No open lesions; see further details (if any) below      MUSCULOSKELETAL EXAMINATION:  Right ankle examination:  - Patient sitting comfortably in the office in no acute distress   -Healed incision site noted over the lateral aspect of the ankle with no other acute visible abnormalities present  -Mild tender palpation noted over incision site  No other bony or soft tissue tenderness palpation noted at this time  -Limited range of motion of the ankle but without pain with motion  - NV intact      _____________________________________________________  STUDIES REVIEWED:  I personally reviewed the images and interpretation is as follows:  Right ankle x-ray 3 views:  Healed distal fibula fracture in acceptable alignment with hardware intact      PROCEDURES PERFORMED:  Procedures         Wilma Prater MD                 Portions of the record may have been created with voice recognition software  Occasional wrong word or "sound a like" substitutions may have occurred due to the inherent limitations of voice recognition software  Read the chart carefully and recognize, using context, where substitutions have occurred

## 2023-02-07 NOTE — LETTER
February 7, 2023     Patient: Eric Amanda  YOB: 1975  Date of Visit: 2/7/2023      To Whom it May Concern:    Eric Amanda is under my professional care  Ele De Jesus was seen in my office on 2/7/2023  Ele De Jesus is not yet cleared for return to work  She will be undergoing physical therapy and we will re-evaluate in 3 weeks  If you have any questions or concerns, please don't hesitate to call           Sincerely,          Abram Junior MD        CC: No Recipients

## 2023-02-09 ENCOUNTER — TELEPHONE (OUTPATIENT)
Dept: OBGYN CLINIC | Facility: HOSPITAL | Age: 48
End: 2023-02-09

## 2023-02-09 NOTE — TELEPHONE ENCOUNTER
Caller: Jonathan Santo from [facility]  Doctor: Griselda Flores    Reason for call: electronically faxed 2/7 OVN and work status to fax # [phone]

## 2023-02-20 ENCOUNTER — EVALUATION (OUTPATIENT)
Dept: PHYSICAL THERAPY | Facility: CLINIC | Age: 48
End: 2023-02-20

## 2023-02-20 ENCOUNTER — TELEMEDICINE (OUTPATIENT)
Dept: BEHAVIORAL/MENTAL HEALTH CLINIC | Facility: CLINIC | Age: 48
End: 2023-02-20

## 2023-02-20 DIAGNOSIS — F43.23 ADJUSTMENT DISORDER WITH MIXED ANXIETY AND DEPRESSED MOOD: Primary | ICD-10-CM

## 2023-02-20 DIAGNOSIS — Z63.8 FAMILY CONFLICT: ICD-10-CM

## 2023-02-20 DIAGNOSIS — S82.891D CLOSED FRACTURE OF RIGHT ANKLE WITH ROUTINE HEALING, SUBSEQUENT ENCOUNTER: ICD-10-CM

## 2023-02-20 SDOH — SOCIAL STABILITY - SOCIAL INSECURITY: OTHER SPECIFIED PROBLEMS RELATED TO PRIMARY SUPPORT GROUP: Z63.8

## 2023-02-20 NOTE — PROGRESS NOTES
PT Evaluation     Today's date: 2023  Patient name: Carry Kawasaki  : 1975  MRN: [id number]  Referring provider: Lexi Noel MD  Dx:   Encounter Diagnosis     ICD-10-CM    1  Closed fracture of right ankle with routine healing, subsequent encounter  S82 891D Ambulatory Referral to Physical Therapy          Start Time:   Stop Time: 1800  Total time in clinic (min): 45 minutes    Assessment  Assessment details: Pt is a 50 y/o female presenting to physical therapy s/p ORIF of the R ankle on   Upon examination, pt presents with impairments of decreased strength, decreased ROM, and increased pain of pt's R ankle resulting in limitations of self-care/ADLs, household activities, ambulation, and stair negotiation  Pt plan of care will focus on improving strength and ROM of pt's R ankle as well as decreasing pain and improving tolerance to functional activities  Pt would benefit from skilled physical therapy to facilitate a return to her prior level of function  Impairments: abnormal gait, abnormal or restricted ROM, activity intolerance, impaired physical strength, lacks appropriate home exercise program, pain with function and weight-bearing intolerance  Functional limitations: self-care/ADLs, household activities, ambulation, and stair negotiation  Symptom irritability: high  Goals  STG - 4 weeks  1  Pt will have a subjective decrease in pain of her R ankle by 2 levels or more during during ambulation  2  Pt will be able to ambulate with a normalized pattern with least restrictive device to facilitate a return to her prior level of function    LTG - 8 weeks  1  Pt will demonstrate WFL AROM of her R ankle in all planes to facilitate a return to her prior level of function  2  Pt's FOTO score will improve from 26 to 53 or better to facilitate a return to pt's prior level of function        Plan  Patient would benefit from: PT eval and skilled physical therapy  Planned modality interventions: cryotherapy, thermotherapy: hydrocollator packs and unattended electrical stimulation  Planned therapy interventions: activity modification, ADL training, balance/weight bearing training, behavior modification, flexibility, functional ROM exercises, gait training, graded exercise, home exercise program, joint mobilization, manual therapy, massage, Ford taping, neuromuscular re-education, patient education, self care, strengthening, stretching, therapeutic activities and therapeutic exercise  Frequency: 3x week  Duration in weeks: 8  Plan of Care beginning date: 2023  Plan of Care expiration date: 2023  Treatment plan discussed with: patient        Subjective Evaluation    History of Present Illness  Mechanism of injury: Pt is a 50 y/o female presenting to physical therapy s/p ORIF of the R ankle on   On , pt reports she fell on ice when her R ankle got caught underneath and feeling a crack  Pt reports having sudden pain at the time and was unable to bear weight on her R ankle  Pt reports going to the ER where she received x-rays which revealed the R ankle fracture  Pt reports having an ORIF of her R ankle on  and was casted and NWB until last week  Pt reports she now has a walking boot and is currently ambulating with one crutch  Pt reports she will have increased pain in her R ankle when she is walking and standing  Pt reports she also is having increased difficulty with performing her household activities and using the stairs  Pt reports she will take pain killers to decrease her R ankle pain  Pt reports she also still has swelling of the R ankle     Quality of life: good    Pain  Current pain ratin  At best pain ratin  At worst pain ratin  Location: R ankle  Quality: dull ache  Relieving factors: medications and rest  Aggravating factors: standing, walking and stair climbing  Progression: improved    Treatments  Previous treatment: medication  Current treatment: physical therapy  Patient Goals  Patient goal: to be able to walk and swim        Objective     Observations     Right Ankle/Foot   Positive for edema  Additional Observation Details  9 5 cm incision healed    Tenderness     Right Ankle/Foot   Tenderness in the lateral malleolus       Active Range of Motion     Right Ankle/Foot   Dorsiflexion (ke): -5 degrees with pain  Plantar flexion: 15 degrees with pain  Inversion: 8 degrees with pain  Eversion: 4 degrees with pain    Passive Range of Motion     Right Ankle/Foot    Dorsiflexion (ke): 5 degrees with pain  Plantar flexion: 20 degrees with pain  Inversion: 10 degrees with pain  Eversion: 5 degrees with pain    Strength/Myotome Testing     Right Ankle/Foot   Dorsiflexion: 2-  Plantar flexion: 2-  Inversion: 2-  Eversion: 2-    Swelling   Left Ankle/Foot   Malleoli: 29 cm    Right Ankle/Foot   Malleoli: 32 cm             Precautions: HTN, ORIF on 12/29      Manuals 2/20            PROM R ankle 8'                                                   Neuro Re-Ed             Education on POC, diagnosis, and HEP 5'                                                                                          Ther Ex             AROM R ankle 2x10            Ankle circles 2x10                                                                                          Ther Activity                                       Gait Training                                       Modalities

## 2023-02-23 ENCOUNTER — OFFICE VISIT (OUTPATIENT)
Dept: PHYSICAL THERAPY | Facility: CLINIC | Age: 48
End: 2023-02-23

## 2023-02-23 DIAGNOSIS — S82.891D CLOSED FRACTURE OF RIGHT ANKLE WITH ROUTINE HEALING, SUBSEQUENT ENCOUNTER: Primary | ICD-10-CM

## 2023-02-23 NOTE — PSYCH
This note was not shared with the patient due to this is a psychotherapy note      Behavioral Health Psychotherapy Progress Note    Psychotherapy Provided: Individual Psychotherapy     1  Adjustment disorder with mixed anxiety and depressed mood        2  Family conflict            Goals addressed in session: Goal 1     DATA: Met with Jessy  Nohemy Marcelo remains out of work after breaking her right ankle  She recently transitioned to a boot for support  She is hopeful that she will be returning back to work soon  She feels her relationship with her  has been better since our last session  Nohemy Marcelo feels more heard in their relationship  Her  continues to attend his own counseling and they are on several waiting lists for marriage counseling  Today, session focused on Jessy's relationship with her son in [facility]  Nohemy Marcelo did follow through legally with having her son removed from her home  Per Nohemy Marcelo, her son has also sued her for emotional distress  Nohemy Marcelo feels her son needs mental health treatment; however, will not follow through  Nohemy Marcelo also recognizes the culture in [facility] with men being an authority figures also contributes to his actions  Jessy processed her emotions about these recent events, voicing frustration and sadness  We discussed how she is grieving her relationship with her son and the physical items she left in [facility] that have now been lost due to their strained relationship  Validation of her feelings was provided, support therapy was provided throughout session  Nohemy Marcelo says she also feels supported by her  and daughter  During this session, this clinician used the following therapeutic modalities: Bereavement Therapy, Client-centered Therapy and Supportive Psychotherapy    Substance Abuse was not addressed during this session   If the client is diagnosed with a co-occurring substance use disorder, please indicate any changes in the frequency or amount of use: na  Stage of change for addressing substance use diagnoses: No substance use/Not applicable    ASSESSMENT:  Mary Wood presents with a anxious, depressed mood  her affect is Normal range and intensity, which is congruent, with her mood and the content of the session  The client has made progress on their goals  Mary Wood presents with a none risk of suicide, none risk of self-harm, and none risk of harm to others  For any risk assessment that surpasses a "low" rating, a safety plan must be developed  A safety plan was indicated: no  If yes, describe in detail na    PLAN: Between sessions, Mary Wood will continue to implement wellness tools learned in therapy; share successes and challenges  At the next session, the therapist will use Client-centered Therapy and Supportive Psychotherapy to address stressors, grief  Behavioral Health Treatment Plan and Discharge Planning: Mary Wood is aware of and agrees to continue to work on their treatment plan  They have identified and are working toward their discharge goals   yes    Visit start and stop times:    2/20/23  Start Time: 1533  Stop Time: 1603  Total Visit Time: 30 minutes

## 2023-02-23 NOTE — PROGRESS NOTES
Daily Note     Today's date: 2023  Patient name: Arland Goldmann  : 1975  MRN: [id number]  Referring provider: Henrene Libman, MD  Dx:   Encounter Diagnosis     ICD-10-CM    1  Closed fracture of right ankle with routine healing, subsequent encounter  S82 891D           Start Time:   Stop Time: 61  Total time in clinic (min): 40 minutes    Subjective: Pt reports she continues to have severe pain and stiffness in her R ankle today  Objective: See treatment diary below      Assessment: Tolerated treatment fair  Patient demonstrated fatigue post treatment, exhibited good technique with therapeutic exercises and would benefit from continued PT  Pt had significant restrictions in motion of her R ankle today secondary to pain and tenderness during PROM  Pt was able to demonstrate improved AROM during performance of BAPS board today  Pt required min verbal cues to facilitate performance of proper technique  Assess pt response to treatment at next visit  Plan: Continue per plan of care        Precautions: HTN, ORIF on       Manuals            PROM R ankle 8' 12'           Grade I/II post mobs  4'                                     Neuro Re-Ed             Pt education 5' 4'           BAPS board ant/post, inv/ev  2x10 ea           Ankle MREs  1x10 ea                                                               Ther Ex             AROM R ankle 2x10 2x10           Ankle circles 2x10 2x10           Long sitting calf stretch  1x10 10"                                                                            Ther Activity                                       Gait Training                                       Modalities

## 2023-02-27 ENCOUNTER — OFFICE VISIT (OUTPATIENT)
Dept: PHYSICAL THERAPY | Facility: CLINIC | Age: 48
End: 2023-02-27

## 2023-02-27 DIAGNOSIS — S82.891D CLOSED FRACTURE OF RIGHT ANKLE WITH ROUTINE HEALING, SUBSEQUENT ENCOUNTER: Primary | ICD-10-CM

## 2023-02-27 NOTE — PROGRESS NOTES
Daily Note     Today's date: 2023  Patient name: Gael Clinton  : 1975  MRN: [id number]  Referring provider: Dinorah Gomez MD  Dx:   Encounter Diagnosis     ICD-10-CM    1  Closed fracture of right ankle with routine healing, subsequent encounter  U10 035Q                      Subjective: The patient states that she was a little sore after her last session  She notes that she has started to develop pain in her R lower back and into her hip, feels its from how she is walking  Objective: See treatment diary below      Assessment: Tolerated treatment fair  She is limited with both A/PROM and strength t/o her ankle  Empty end feel noted with ankle stretching, limited more by pain than stretch  Patient would benefit from continued PT to improve function and mobility  Plan: Continue per plan of care        Precautions: HTN, ORIF on       Manuals           PROM R ankle 8' 12' 12'          Grade I/II post mobs  4' 4'                                    Neuro Re-Ed             Pt education 5' 4'           [facility] board ant/post, inv/ev  2x10 ea 2x10 ea          Ankle MREs  1x10 ea 1x10 ea                                                              Ther Ex             AROM R ankle 2x10 2x10 2x10          Ankle circles 2x10 2x10 2x10          Long sitting calf stretch  1x10 10" 1x10 10"                                                                           Ther Activity                                       Gait Training                                       Modalities

## 2023-03-01 ENCOUNTER — TELEPHONE (OUTPATIENT)
Dept: OBGYN CLINIC | Facility: MEDICAL CENTER | Age: 48
End: 2023-03-01

## 2023-03-01 NOTE — TELEPHONE ENCOUNTER
Caller: Onelia Bowman from [address] [facility]    Doctor: Jessica Joseph    Reason for call:     Onelia Bowman calling to confirm patients next appointment  Confirmed      Call back#: n/a

## 2023-03-02 DIAGNOSIS — S82.891A CLOSED FRACTURE OF RIGHT ANKLE, INITIAL ENCOUNTER: Primary | ICD-10-CM

## 2023-03-02 NOTE — PROGRESS NOTES
Daily Note     Today's date: 3/3/2023  Patient name: Kobi Navarro  : 1975  MRN: [id number]  Referring provider: Iris Callahan MD  Dx:   Encounter Diagnosis     ICD-10-CM    1  Closed fracture of right ankle with routine healing, subsequent encounter  N99 [id number]                      Subjective: The patient states that she feels okay this morning because she just got out of bed  Notes that her swelling has been less, typically is less swollen in the morning but does increase as the day goes on  She will be going back to see the doctor on Tuesday for her follow up appointment  Objective: See treatment diary below      Assessment: Tolerated treatment fair  Added ankle TBand, ProStretch and seated HR/TR with patient today to progress with ROM and strengthening  Weakness and limited A/PROM is noted t/o her ankle  No increase in pain noted during session or with new TE  Patient would benefit from continued PT      Plan: Continue per plan of care        Precautions: HTN, ORIF on       Manuals 2/20 2/23 2/27 3/3         PROM R ankle 8' 12' 12' 12'         Grade I/II post mobs  4' 4' 4'                                   Neuro Re-Ed             Pt education 5' 4'  4'         BAPS board ant/post, inv/ev  2x10 ea 2x10 ea 2x10 ea         Ankle MREs  1x10 ea 1x10 ea 1x10 ea                                                             Ther Ex             AROM R ankle 2x10 2x10 2x10 2x10 ea         Ankle circles 2x10 2x10 2x10 2x10 ea         Long sitting calf stretch  1x10 10" 1x10 10" 1x10 10"         Ankle TBand    Peach df/pf  15x ea         Seated HR/TR    20x ea         ProStretch    10"x10 seated                                    Ther Activity                                       Gait Training                                       Modalities

## 2023-03-03 ENCOUNTER — OFFICE VISIT (OUTPATIENT)
Dept: PHYSICAL THERAPY | Facility: CLINIC | Age: 48
End: 2023-03-03

## 2023-03-03 DIAGNOSIS — S82.891D CLOSED FRACTURE OF RIGHT ANKLE WITH ROUTINE HEALING, SUBSEQUENT ENCOUNTER: Primary | ICD-10-CM

## 2023-03-06 ENCOUNTER — OFFICE VISIT (OUTPATIENT)
Dept: PHYSICAL THERAPY | Facility: CLINIC | Age: 48
End: 2023-03-06

## 2023-03-06 DIAGNOSIS — S82.891D CLOSED FRACTURE OF RIGHT ANKLE WITH ROUTINE HEALING, SUBSEQUENT ENCOUNTER: Primary | ICD-10-CM

## 2023-03-06 NOTE — PROGRESS NOTES
Daily Note     Today's date: 3/6/2023  Patient name: Maral Norris  : 1975  MRN: [id number]  Referring provider: Lanre Barragan MD  Dx:   Encounter Diagnosis     ICD-10-CM    1  Closed fracture of right ankle with routine healing, subsequent encounter  F04 [id number]                      Subjective: Patient presents to PT in CAM boot, states it is sore from being on it  Objective: See treatment diary below      Assessment: Tolerated treatment well  Cont to exhibit decreased AROM>PROM  Discussed performing AROM at home 2x a day as well as calf stretch  No issues with charted program  Patient demonstrated fatigue post treatment and would benefit from continued PT      Plan: Continue per plan of care        Precautions: HTN, ORIF on       Manuals 2/20 2/23 2/27 3/3 3/6        PROM R ankle 8' 12' 12' 12' 12'        Grade I/II post mobs  4' 4' 4'                                   Neuro Re-Ed             Pt education 5' 4'  4'         BAPS board ant/post, inv/ev  2x10 ea 2x10 ea 2x10 ea 2x10 ea        Ankle MREs  1x10 ea 1x10 ea 1x10 ea Inv/Ev  1x10 ea 5"                                                            Ther Ex             AROM R ankle 2x10 2x10 2x10 2x10 ea 2x10 ea         Ankle circles 2x10 2x10 2x10 2x10 ea 2x10 ea        Long sitting calf stretch  1x10 10" 1x10 10" 1x10 10" 1x10   10" w/ bolster        Ankle TBand    Peach df/pf  15x ea Peach DF/PF 20x ea        Seated HR/TR    20x ea 20x ea        ProStretch    10"x10 seated  10"x10                                   Ther Activity                                       Gait Training                                       Modalities

## 2023-03-07 ENCOUNTER — APPOINTMENT (OUTPATIENT)
Dept: RADIOLOGY | Facility: CLINIC | Age: 48
End: 2023-03-07

## 2023-03-07 ENCOUNTER — OFFICE VISIT (OUTPATIENT)
Dept: PHYSICAL THERAPY | Facility: CLINIC | Age: 48
End: 2023-03-07

## 2023-03-07 ENCOUNTER — OFFICE VISIT (OUTPATIENT)
Dept: OBGYN CLINIC | Facility: CLINIC | Age: 48
End: 2023-03-07

## 2023-03-07 VITALS
HEIGHT: 64 IN | HEART RATE: 85 BPM | WEIGHT: 229 LBS | DIASTOLIC BLOOD PRESSURE: 73 MMHG | SYSTOLIC BLOOD PRESSURE: 128 MMHG | BODY MASS INDEX: 39.09 KG/M2

## 2023-03-07 DIAGNOSIS — S82.891A CLOSED FRACTURE OF RIGHT ANKLE, INITIAL ENCOUNTER: ICD-10-CM

## 2023-03-07 DIAGNOSIS — S82.891A CLOSED FRACTURE OF RIGHT ANKLE, INITIAL ENCOUNTER: Primary | ICD-10-CM

## 2023-03-07 DIAGNOSIS — Z48.89 AFTERCARE FOLLOWING SURGERY: ICD-10-CM

## 2023-03-07 DIAGNOSIS — S82.891D CLOSED FRACTURE OF RIGHT ANKLE WITH ROUTINE HEALING, SUBSEQUENT ENCOUNTER: Primary | ICD-10-CM

## 2023-03-07 RX ORDER — GABAPENTIN 300 MG/1
300 CAPSULE ORAL
Qty: 60 CAPSULE | Refills: 1 | Status: SHIPPED | OUTPATIENT
Start: 2023-03-07

## 2023-03-07 RX ORDER — MELOXICAM 15 MG/1
15 TABLET ORAL DAILY
Qty: 30 TABLET | Refills: 1 | Status: SHIPPED | OUTPATIENT
Start: 2023-03-07

## 2023-03-07 RX ORDER — GABAPENTIN 250 MG/5ML
250 SOLUTION ORAL
Qty: 50 ML | Refills: 1 | Status: CANCELLED | OUTPATIENT
Start: 2023-03-07

## 2023-03-07 RX ORDER — GABAPENTIN 400 MG/1
400 CAPSULE ORAL DAILY
Qty: 30 CAPSULE | Refills: 2 | Status: CANCELLED | OUTPATIENT
Start: 2023-03-07

## 2023-03-07 NOTE — PROGRESS NOTES
Orthopaedics Office Visit - Post-op Patient Visit    ASSESSMENT/PLAN:    Assessment:  - 2 5 months ORIF right ankle bimalleolar equivalent fracture   - DOS 12/29/2022  - pain, hypersensitivity and limited ROM , concern for CRPS  - difficulty WB      Plan:   - continue physical therapy   - prescribed meloxicam 15mg to be taken daily as needed, if she does not like the meloxicam, we discussed she can take OTC NSAIDS  - prescribed 300mg gabapentin to be taken only at night   - d/c the walking boot and begin in the lace up ankle brace   - [facility] for CRPS - compounding cream, possible nerve block  - follow up in 1 month for eval of progress and ability to return to work     To Do Next Visit:  XR right ankle   Repeat evaluation     _____________________________________________________  CHIEF COMPLAINT:  Chief Complaint   Patient presents with   • Right Ankle - Post-op         SUBJECTIVE:  Maral Norris is a 49 y o  female who presents 2 5 months s/p right ORIF right bimall equiv performed on 12/29/2022  Patient states she has been doing better since her last visit on 02/07/2023  Since her last visit, she has been WBAT in the walking boot, she has started physical therapy and she is feeling a little bit better,but still having pain when walking with and without the boot, stairs are difficult, standing for longer periods of time are difficult  She also mentions a tingling sensation in her toes and generalized soreness when doing physical therapy  She does notice some swelling to the area still  She takes Tylenol as needed for pain  Her occupation is a  at [employer]; workers comp injury       SOCIAL HISTORY:  Social History     Tobacco Use   • Smoking status: Never   • Smokeless tobacco: Never   Vaping Use   • Vaping Use: Never used   Substance Use Topics   • Alcohol use: Not Currently   • Drug use: Never       MEDICATIONS:    Current Outpatient Medications:   •  Iron-Vitamin C (Vitron-C)  MG TABS, Take 1 tablet by mouth 2 (two) times a day, Disp: 60 tablet, Rfl: 3  •  levothyroxine 50 mcg tablet, Take 1 tablet (50 mcg total) by mouth daily, Disp: 90 tablet, Rfl: 1  •  multivitamin (THERAGRAN) TABS, Take 1 tablet by mouth daily, Disp: , Rfl:   •  oxyCODONE (Roxicodone) 5 immediate release tablet, Take 1 tablet (5 mg total) by mouth every 4 (four) hours as needed for moderate pain Max Daily Amount: 30 mg, Disp: 30 tablet, Rfl: 0  •  sertraline (ZOLOFT) 50 mg tablet, Take 1 tablet (50 mg total) by mouth daily, Disp: 90 tablet, Rfl: 5  •  VITAMIN D PO, Take by mouth, Disp: , Rfl:   •  aspirin (ECOTRIN LOW STRENGTH) 81 mg EC tablet, Take 1 tablet (81 mg total) by mouth every 12 (twelve) hours, Disp: 84 tablet, Rfl: 0  •  predniSONE 20 mg tablet, Take 1 tablet (20 mg total) by mouth daily (Patient not taking: Reported on 12/28/2022), Disp: 5 tablet, Rfl: 0    REVIEW OF SYSTEMS:  MSK: right ankle pain  Neuro: normal   Pertinent items are otherwise noted in HPI  A comprehensive review of systems was otherwise negative     _____________________________________________________  PHYSICAL EXAMINATION:  Vital signs: Ht 5' 4" (1 626 m)   Wt 104 kg (229 lb)   BMI 39 31 kg/m²   General: No acute distress, awake and alert  Psychiatric: Mood and affect appear appropriate  HEENT: Trachea Midline, No torticollis, no apparent facial trauma  Cardiovascular: No audible murmurs;  Extremities appear perfused  Pulmonary: No audible wheezing or stridor  Skin: No open lesions; see further details (if any) below    MUSCULOSKELETAL EXAMINATION:  Right Ankle Exam:   - Patient sitting comfortably in the office in no acute distress   - mild dislocation on lateral aspect of the right ankle   - incision has healed well  - pain and sensitivity present over fracture and incision site  -Limited range of motion of the ankle but without pain with motion  - neurovascular intact _____________________________________________________  STUDIES REVIEWED:  I personally reviewed the images and interpretation is as follows:  XR right ankle 3+ views demonstrates healed right distal fibula fracture in acceptable alignment and hardware in appropriate alignment       PROCEDURES PERFORMED:  Procedures          Scribe Attestation    I,:  Melly Simmons am acting as a scribe while in the presence of the attending physician :       I,:  Wilma Prater MD personally performed the services described in this documentation    as scribed in my presence :

## 2023-03-07 NOTE — PROGRESS NOTES
Daily Note     Today's date: 3/7/2023  Patient name: Bernabe Sharma  : 1975  MRN: [id number]  Referring provider: Hazel Drew MD  Dx:   Encounter Diagnosis     ICD-10-CM    1  Closed fracture of right ankle with routine healing, subsequent encounter  H85 823A                      Subjective: Patient had appt with surgeon this morning  She is in a lace up brace and has an appt to pain management due to high levels of pain with WBing  Objective: See treatment diary below      Assessment: AROM progressing slowly exhibits visibly improved ROM  Better tolerance to manuals  Initiated WBing exercises to built tolerance to SLB for proper amb  She did well despite discomfort  Discussed using ice at home more often due to increased activity  Patient demonstrated fatigue post treatment and would benefit from continued PT      Plan: Progress treatment as tolerated  Cont to work in building tolerance to [employer]        Precautions: HTN, ORIF on       Manuals 2/20 2/23 2/27 3/3 3/6 3/7       PROM R ankle 8' 12' 12' 12' 12' 10'       Grade I/II post mobs  4' 4' 4'                                   Neuro Re-Ed             Pt education 5' 4'  4'         BAPS board ant/post, inv/ev  2x10 ea 2x10 ea 2x10 ea 2x10 ea        Ankle MREs  1x10 ea 1x10 ea 1x10 ea Inv/Ev  1x10 ea 5"                                                            Ther Ex             AROM R ankle 2x10 2x10 2x10 2x10 ea 2x10 ea  2x10       Ankle circles 2x10 2x10 2x10 2x10 ea 2x10 ea alphabet 1x       Long sitting calf stretch  1x10 10" 1x10 10" 1x10 10" 1x10   10" w/ bolster 1x10 10" w/ bolster under knee       Ankle TBand    Peach df/pf  15x ea Peach DF/PF 20x ea Peach DF/PF  Inv/Ev  20x ea       Seated HR/TR    20x ea 20x ea        ProStretch    10"x10 seated  10"x10  10"x10                    Nustep       4'       Ther Activity             Biodex weight shifting      5" hold at 100% on R 20x                    Gait Training Modalities             CP      10 min post

## 2023-03-08 ENCOUNTER — TELEPHONE (OUTPATIENT)
Dept: OBGYN CLINIC | Facility: HOSPITAL | Age: 48
End: 2023-03-08

## 2023-03-08 NOTE — TELEPHONE ENCOUNTER
Caller: [facility]    Doctor: David Garvin    Reason for call: Can referral for SPA be faxed to them  Attention: Muna Ireland claim [id number]  Fax [phone]    Call back#: [phone]

## 2023-03-13 ENCOUNTER — OFFICE VISIT (OUTPATIENT)
Dept: PHYSICAL THERAPY | Facility: CLINIC | Age: 48
End: 2023-03-13

## 2023-03-13 DIAGNOSIS — S82.891D CLOSED FRACTURE OF RIGHT ANKLE WITH ROUTINE HEALING, SUBSEQUENT ENCOUNTER: Primary | ICD-10-CM

## 2023-03-13 NOTE — PROGRESS NOTES
Daily Note     Today's date: 3/13/2023  Patient name: Kevan Ramos  : 1975  MRN: [id number]  Referring provider: Hailey Estrada MD  Dx:   Encounter Diagnosis     ICD-10-CM    1  Closed fracture of right ankle with routine healing, subsequent encounter  S82 [id number]           Start Time: 1630  Stop Time: 2803  Total time in clinic (min): 45 minutes    Subjective: Pt reports her R ankle swells up when she walks on her feet all day and then feels better once she elevates it and rests it  Objective: See treatment diary below      Assessment: Tolerated treatment well  Patient demonstrated fatigue post treatment, exhibited good technique with therapeutic exercises and would benefit from continued PT  Initiated gentle joint distraction to decrease stiffness and improve ankle ROM, pt demonstrated slight discomfort with performance so distraction was kept very light to avoid aggravation  Pt noted slight tingling in her toes with calf stretching exercises, unsure if from compression on foot from strap, will continue to monitor sx and modify exercises as needed  Plan: Continue per plan of care  Progress treatment as tolerated         Precautions: HTN, ORIF on       Manuals 2/20 2/23 2/27 3/3 3/6 3/7 3/13      PROM R ankle 8' 12' 12' 12' 12' 10' 12'      Grade I/II post mobs  4' 4' 4'         Gr  I joint distraction       3'                   Neuro Re-Ed             Pt education 5' 4'  4'         BAPS board ant/post, inv/ev  2x10 ea 2x10 ea 2x10 ea 2x10 ea        Ankle MREs  1x10 ea 1x10 ea 1x10 ea Inv/Ev  1x10 ea 5"                                                            Ther Ex             AROM R ankle 2x10 2x10 2x10 2x10 ea 2x10 ea  2x10 2x10      Ankle circles 2x10 2x10 2x10 2x10 ea 2x10 ea alphabet 1x alphabet 1x      Long sitting calf stretch  1x10 10" 1x10 10" 1x10 10" 1x10   10" w/ bolster 1x10 10" w/ bolster under knee 1x10 10" w/ bolster under knee      Ankle TBand    Peach df/pf  15x ea Peach DF/PF 20x ea Peach DF/PF  Inv/Ev  20x ea Peach DF/PF  Inv/Ev  20x ea      Seated HR/TR    20x ea 20x ea        ProStretch    10"x10 seated  10"x10  10"x10 10"x10 seated                   Nustep       4' 5'      Ther Activity             Biodex weight shifting      5" hold at 100% on R 20x                    Gait Training                                       Modalities             CP      10 min post

## 2023-03-16 ENCOUNTER — OFFICE VISIT (OUTPATIENT)
Dept: PHYSICAL THERAPY | Facility: CLINIC | Age: 48
End: 2023-03-16

## 2023-03-16 DIAGNOSIS — S82.891D CLOSED FRACTURE OF RIGHT ANKLE WITH ROUTINE HEALING, SUBSEQUENT ENCOUNTER: Primary | ICD-10-CM

## 2023-03-16 NOTE — PROGRESS NOTES
Daily Note     Today's date: 3/16/2023  Patient name: Jarod Brito  : 1975  MRN: [id number]  Referring provider: Anuradha Mcallister MD  Dx:   Encounter Diagnosis     ICD-10-CM    1  Closed fracture of right ankle with routine healing, subsequent encounter  L01 381U                      Subjective: Patient reports she is feeling a little bit better  She has appt with pain management on Monday  Objective: See treatment diary below      Assessment: Tolerated treatment well  Sharp pain at available end range with PROM  AROM progressing slowly  Cont to be challenged with inversion/eversion  Decreased hold times and reps on biodex weight shifting due to poor tolerance, cont to work on in future visits  Patient demonstrated fatigue post treatment and would benefit from continued PT      Plan: Continue per plan of care        Precautions: HTN, ORIF on       Manuals 2/20 2/23 2/27 3/3 3/6 3/7 3/13 3/16     PROM R ankle 8' 12' 12' 12' 12' 10' 12' 12'     Grade I/II post mobs  4' 4' 4'         Gr  I joint distraction       3'                   Neuro Re-Ed             Pt education 5' 4'  4'         BAPS board ant/post, inv/ev  2x10 ea 2x10 ea 2x10 ea 2x10 ea        Ankle MREs  1x10 ea 1x10 ea 1x10 ea Inv/Ev  1x10 ea 5"                                                            Ther Ex             AROM R ankle 2x10 2x10 2x10 2x10 ea 2x10 ea  2x10 2x10 2x10     Ankle circles 2x10 2x10 2x10 2x10 ea 2x10 ea alphabet 1x alphabet 1x alphabet 1x     Long sitting calf stretch  1x10 10" 1x10 10" 1x10 10" 1x10   10" w/ bolster 1x10 10" w/ bolster under knee 1x10 10" w/ bolster under knee 6x15" w/ bolster under knee     Ankle TBand    Peach df/pf  15x ea Peach DF/PF 20x ea Peach DF/PF  Inv/Ev  20x ea Peach DF/PF  Inv/Ev  20x ea Peach DF/PF  INV/EV  20x ea     Seated HR/TR    20x ea 20x ea        ProStretch    10"x10 seated  10"x10  10"x10 10"x10 seated 10"x10                  Nustep       4' 5' 5'     Ther Activity             Biodex weight shifting      5" hold at 100% on R 20x  No holds   100% on R 10x                  Gait Training                                       Modalities             CP      10 min post

## 2023-03-20 ENCOUNTER — APPOINTMENT (OUTPATIENT)
Dept: PHYSICAL THERAPY | Facility: CLINIC | Age: 48
End: 2023-03-20

## 2023-03-20 ENCOUNTER — CONSULT (OUTPATIENT)
Dept: PAIN MEDICINE | Facility: CLINIC | Age: 48
End: 2023-03-20

## 2023-03-20 VITALS
HEART RATE: 72 BPM | BODY MASS INDEX: 41.18 KG/M2 | HEIGHT: 64 IN | DIASTOLIC BLOOD PRESSURE: 88 MMHG | WEIGHT: 241.2 LBS | SYSTOLIC BLOOD PRESSURE: 152 MMHG

## 2023-03-20 DIAGNOSIS — G89.29 CHRONIC PAIN OF RIGHT ANKLE: ICD-10-CM

## 2023-03-20 DIAGNOSIS — S82.891A CLOSED FRACTURE OF RIGHT ANKLE, INITIAL ENCOUNTER: ICD-10-CM

## 2023-03-20 DIAGNOSIS — M25.571 CHRONIC PAIN OF RIGHT ANKLE: ICD-10-CM

## 2023-03-20 DIAGNOSIS — G90.521 COMPLEX REGIONAL PAIN SYNDROME TYPE 1 OF RIGHT LOWER EXTREMITY: Primary | ICD-10-CM

## 2023-03-20 RX ORDER — GABAPENTIN 300 MG/1
300 CAPSULE ORAL 3 TIMES DAILY
Qty: 90 CAPSULE | Refills: 1 | Status: SHIPPED | OUTPATIENT
Start: 2023-03-20

## 2023-03-20 NOTE — PROGRESS NOTES
Assessment:  1  Complex regional pain syndrome type 1 of right lower extremity    2  Closed fracture of right ankle, initial encounter    3  Chronic pain of right ankle        Plan:  No orders of the defined types were placed in this encounter  New Medications Ordered This Visit   Medications   • gabapentin (Neurontin) 300 mg capsule     Sig: Take 1 capsule (300 mg total) by mouth 3 (three) times a day     Dispense:  90 capsule     Refill:  1       My impressions and treatment recommendations were discussed in detail with the patient, who verbalized understanding and had no further questions  This is a 65-year-old female presents her office chief complaint of chronic right ankle pain following ankle fracture and ORIF  She feels that she is slowly making progress and is currently still in physical therapy  Exam today, patient has notable ankle swelling, but no temperature difference or skin discoloration  She only has tenderness palpation over the incision site and the lateral malleolar area  Herself does not report skin color or temperature changes either  Though she meets [place] criteria for CRPS, this is not a very severe case  I do think with continued participation in physical therapy and limb usage, that her symptoms will slowly improve  She is only taking gabapentin 300 mg nightly  We will increase this to 300 mg 3 times daily  Refill was sent today  I will also order topical ketamine based compounding cream which was sent to compounding pharmacy  Apply this 3-4 times a day over the affected limb  We will see her back in 4 weeks or sooner medically necessary  I did provide information about lumbar sympathetic block, though I think she may improve with just conservative management          [place] Prescription Drug Monitoring Program report was reviewed and was appropriate     Complete risks and benefits including bleeding, infection, tissue reaction, nerve injury and allergic reaction were discussed  The approach was demonstrated using models and literature was provided  Verbal and written consent was obtained  Discharge instructions were provided  I personally saw and examined the patient and I agree with the above discussed plan of care  History of Present Illness:    Arland Goldmann is a 49 y o  female who presents to [facility] for initial evaluation of the above stated pain complaints  The patient has a past medical and chronic pain history as outlined in the assessment section  She was referred by Fanny Oconnell MD  [address]   Here with chief complaint of pain in the right ankle, knee, and back  Patient is a sales employee  Suffered injury at work on 12/24/2022  sHe suffered an ankle fracture and is status post ORIF of the right ankle  Most pain is in the right foot  She feels that she is slowly getting better following ORIF  Currently in PT twice a week  Pain is     Moderate over the past month  6-7 out of 10  Intermittent  Worse in the evening  Shooting, pins-and-needles with the tingling in the toes  Increase with walking and exercise  No change with standing or bending  Decreased with lying down or sitting  Medical history includes anxiety, hypertension, thyroid disease  Moderate leaf with surgery, physical therapy  Excellent relief with psychotherapy  No tobacco, marijuana, alcohol use  Not on blood thinners  Not allergic to latex or contrast dye  In the past has used oxycodone  In the past does use Voltaren gel  Currently using acetaminophen, ibuprofen, meloxicam with some relief  In the past does also use Valium  Also currently seeing Zoloft as well  Review of Systems:    Review of Systems   Constitutional: Positive for unexpected weight change  Negative for fever  HENT: Negative for trouble swallowing  Eyes: Negative for visual disturbance  Respiratory: Negative for shortness of breath and wheezing  Cardiovascular: Positive for leg swelling  Negative for chest pain and palpitations  Gastrointestinal: Negative for constipation, diarrhea, nausea and vomiting  Endocrine: Positive for polyuria  Negative for cold intolerance, heat intolerance and polydipsia  Genitourinary: Negative for difficulty urinating and frequency  Musculoskeletal: Positive for arthralgias  Negative for gait problem, joint swelling and myalgias  Skin: Negative for rash  Neurological: Negative for dizziness, seizures, syncope, weakness and headaches  Hematological: Does not bruise/bleed easily  Psychiatric/Behavioral: Negative for dysphoric mood  All other systems reviewed and are negative            Past Medical History:   Diagnosis Date   • Allergic    • GERD (gastroesophageal reflux disease)    • Hypertension    • Sleep apnea     does not use cpap "yet"   • Sleep apnea, obstructive    • Varicella        Past Surgical History:   Procedure Laterality Date   •  SECTION      x2    • MO OPEN TX DISTAL FIBULAR FRACTURE LAT MALLEOLUS Right 2022    Procedure: OPEN REDUCTION W/ INTERNAL FIXATION (ORIF) RIGHT ANKLE BIMALL EQUIV FRACTURE;  Surgeon: Portia Wellington MD;  Location: [facility];  Service: Orthopedics       Family History   Problem Relation Age of Onset   • Esophageal cancer Mother    • Heart disease Father    • No Known Problems Sister    • No Known Problems Daughter    • No Known Problems Maternal Grandmother    • No Known Problems Maternal Grandfather    • No Known Problems Paternal Grandmother    • No Known Problems Paternal Grandfather    • No Known Problems Sister    • No Known Problems Brother    • No Known Problems Brother    • No Known Problems Son    • No Known Problems Maternal Aunt    • No Known Problems Paternal Aunt    • No Known Problems Paternal Aunt    • No Known Problems Paternal Aunt    • No Known Problems Paternal Aunt    • No Known Problems Paternal Aunt        Social History     Occupational History   • Not on file   Tobacco Use   • Smoking status: Never   • Smokeless tobacco: Never   Vaping Use   • Vaping Use: Never used   Substance and Sexual Activity   • Alcohol use: Not Currently   • Drug use: Never   • Sexual activity: Yes     Partners: Male     Birth control/protection: None         Current Outpatient Medications:   •  gabapentin (Neurontin) 300 mg capsule, Take 1 capsule (300 mg total) by mouth 3 (three) times a day, Disp: 90 capsule, Rfl: 1  •  Iron-Vitamin C (Vitron-C)  MG TABS, Take 1 tablet by mouth 2 (two) times a day, Disp: 60 tablet, Rfl: 3  •  levothyroxine 50 mcg tablet, Take 1 tablet (50 mcg total) by mouth daily, Disp: 90 tablet, Rfl: 1  •  meloxicam (Mobic) 15 mg tablet, Take 1 tablet (15 mg total) by mouth daily With food, Disp: 30 tablet, Rfl: 1  •  multivitamin (THERAGRAN) TABS, Take 1 tablet by mouth daily, Disp: , Rfl:   •  sertraline (ZOLOFT) 50 mg tablet, Take 1 tablet (50 mg total) by mouth daily, Disp: 90 tablet, Rfl: 5  •  VITAMIN D PO, Take by mouth, Disp: , Rfl:   •  aspirin (ECOTRIN LOW STRENGTH) 81 mg EC tablet, Take 1 tablet (81 mg total) by mouth every 12 (twelve) hours, Disp: 84 tablet, Rfl: 0    No Known Allergies    Physical Exam:    /88 (BP Location: Left arm, Patient Position: Sitting, Cuff Size: Standard)   Pulse 72   Ht 5' 4" (1 626 m)   Wt 109 kg (241 lb 3 2 oz)   BMI 41 40 kg/m²     Constitutional: normal, well developed, well nourished, alert, in no distress and non-toxic and no overt pain behavior    Eyes: anicteric  HEENT: grossly intact  Neck: supple, symmetric, trachea midline and no masses   Pulmonary:even and unlabored  Cardiovascular:No edema or pitting edema present  Skin:Normal without rashes or lesions and well hydrated  Psychiatric:Mood and affect appropriate  Neurologic:Cranial Nerves II-XII grossly intact  Musculoskeletal: inspection of right ankle notable for edema  Incision is clean , dry, intact  There notable tenderness to palpation during over the incision in the lateral malleolar area  There is some tendernes along achilles tendon  [place] Criteria for CRPS  1  Patient reported symptoms- requires 3 of 4  -(SENSORY) Hyperesthesia and/or allodynia: Yes  -(VASOMOTOR) Temperature asymmetry and/or skin color changes/asymmetry: No  -(SUDOMOTOR/EDEMA) Edema and/or sweating changes/assymetry: Yes  -(MOTOR/TROPHIC) Decreased range of motion and/or motor dysfunction and/or trophic changes: Yes  2  Clinican observed signs- requires at least one in 2 or more categories  -(SENSORY) Hyperalgesia and/or allodynia: Yes  -(VASOMOTOR) Temperature asymmetry and/or skin color changes/asymmetry: No  -(SUDOMOTOR/EDEMA) Edema and/or sweating changes/assymetry: Yes  -(MOTOR/TROPHIC) Decreased range of motion and/or motor dysfunction and/or trophic changes: YES      Imaging  RIGHT ANKLE   3/07/2023     INDICATION:   S82 891A: Other fracture of right lower leg, initial encounter for closed fracture      COMPARISON:  Compared to 02/07/23     VIEWS:  XR ANKLE 3+ VW RIGHT         FINDINGS:     Plate and screw in the distal fibula in alignment  Ankle mortise is maintained         Small calcaneal spur and enthesopathy      No lytic or blastic osseous lesion      Soft tissues are unremarkable      IMPRESSION:     No acute osseous abnormality      No orders to display       No orders of the defined types were placed in this encounter

## 2023-03-20 NOTE — PATIENT INSTRUCTIONS
Complex Regional Pain Syndrome   WHAT YOU NEED TO KNOW:   Complex regional pain syndrome (CRPS) is a painful condition that can occur in one or more of your limbs  CRPS has 2 types  Type 1 has no known cause  Type 2 is caused by nerve damage  DISCHARGE INSTRUCTIONS:   Follow up with your healthcare provider or pain specialist as directed: You may need to have weekly visits with your provider or pain specialist to see if your treatment is working  Bring a list of any questions you have so you remember to ask them during your visits  Medicines:   Medicines  may be given to treat swelling, nerve pain, or blood flow  You may need more than one medicine to treat your symptoms  Take your medicine as directed  Contact your healthcare provider if you think your medicine is not helping or if you have side effects  Tell your provider if you are allergic to any medicine  Keep a list of the medicines, vitamins, and herbs you take  Include the amounts, and when and why you take them  Bring the list or the pill bottles to follow-up visits  Carry your medicine list with you in case of an emergency  Physical therapy:  A physical therapist can teach you exercises to help improve movement and strength of your injured limb  Manage CRPS:   Stay active  Movement of the injured limb may help lower your pain  Find ways to relax  Your pain may increase when you are anxious  Ask your provider about relaxation techniques that help lower pain  Contact your healthcare provider if:   You have limb pain that you think is worse than any recent injury should cause  You have limb pain that continues after an injury heals  You have limb pain caused by things that do not normally cause pain, such as a touch to the skin  You have swelling, sweating, and skin temperature changes in an injured limb  You have trouble moving an injured limb      © Copyright [employer] 2022 Information is for End User's use only and may not be sold, redistributed or otherwise used for commercial purposes  The above information is an  only  It is not intended as medical advice for individual conditions or treatments  Talk to your doctor, nurse or pharmacist before following any medical regimen to see if it is safe and effective for you

## 2023-03-21 ENCOUNTER — OFFICE VISIT (OUTPATIENT)
Dept: PHYSICAL THERAPY | Facility: CLINIC | Age: 48
End: 2023-03-21

## 2023-03-21 ENCOUNTER — DOCUMENTATION (OUTPATIENT)
Dept: PAIN MEDICINE | Facility: CLINIC | Age: 48
End: 2023-03-21

## 2023-03-21 DIAGNOSIS — S82.891D CLOSED FRACTURE OF RIGHT ANKLE WITH ROUTINE HEALING, SUBSEQUENT ENCOUNTER: Primary | ICD-10-CM

## 2023-03-21 NOTE — PROGRESS NOTES
Daily Note     Today's date: 3/21/2023  Patient name: Arland Goldmann  : 1975  MRN: [id number]  Referring provider: Henrene Libman, MD  Dx:   Encounter Diagnosis     ICD-10-CM    1  Closed fracture of right ankle with routine healing, subsequent encounter  N69 [id number]                      Subjective: Patient had appt with pain management yesterday, he is giving her a cream to use for pain relief  She reports she is beginning to feel a bit better and is able to move it more  Objective: See treatment diary below      Assessment: Tolerated treatment well, with improved pain tolerance noted  Improved ROM both passive and active with less pain  Able to progress table program with towel scrunches and attempted toe yoga, she was able to extend great toe but not without others, will cont to work on this to address weak foot intrinsics  Patient demonstrated fatigue post treatment and would benefit from continued PT      Plan: Progress treatment as tolerated         Precautions: HTN, ORIF on       Manuals 2/20 2/23 2/27 3/3 3/6 3/7 3/13 3/16 3/21    PROM R ankle 8' 12' 12' 12' 12' 10' 12' 12' 10'    Grade I/II post mobs  4' 4' 4'         Gr  I joint distraction       3'                   Neuro Re-Ed             Pt education 5' 4'  4'         BAPS board ant/post, inv/ev  2x10 ea 2x10 ea 2x10 ea 2x10 ea        Ankle MREs  1x10 ea 1x10 ea 1x10 ea Inv/Ev  1x10 ea 5"        Towel scrunches         30x    Great toe Ext         20x     Toe Yoga         3x NV                Ther Ex             AROM R ankle 2x10 2x10 2x10 2x10 ea 2x10 ea  2x10 2x10 2x10 2x10    Ankle circles 2x10 2x10 2x10 2x10 ea 2x10 ea alphabet 1x alphabet 1x alphabet 1x alphabet 1x    Long sitting calf stretch  1x10 10" 1x10 10" 1x10 10" 1x10   10" w/ bolster 1x10 10" w/ bolster under knee 1x10 10" w/ bolster under knee 6x15" w/ bolster under knee 6x 15" w/ bolster under knee    Ankle TBand    Peach df/pf  15x ea Peach DF/PF 20x ea Peach DF/PF  Inv/Ev  20x ea Peach DF/PF  Inv/Ev  20x ea Peach DF/PF  INV/EV  20x ea Peach DF/PF/INV/EV    Seated HR/TR    20x ea 20x ea        ProStretch    10"x10 seated  10"x10  10"x10 10"x10 seated 10"x10 seated 0"x5                 Nustep       4' 5' 5' NV    Ther Activity             Biodex weight shifting      5" hold at 100% on R 20x  No holds   100% on R 10x No holds 100% on R 20x                 Gait Training                                       Modalities             CP      10 min post

## 2023-03-23 ENCOUNTER — OFFICE VISIT (OUTPATIENT)
Dept: PHYSICAL THERAPY | Facility: CLINIC | Age: 48
End: 2023-03-23

## 2023-03-23 DIAGNOSIS — S82.891D CLOSED FRACTURE OF RIGHT ANKLE WITH ROUTINE HEALING, SUBSEQUENT ENCOUNTER: Primary | ICD-10-CM

## 2023-03-23 NOTE — PROGRESS NOTES
Daily Note     Today's date: 3/23/2023  Patient name: Florence Muniz  : 1975  MRN: [id number]  Referring provider: Eldridge Olszewski, MD  Dx:   Encounter Diagnosis     ICD-10-CM    1  Closed fracture of right ankle with routine healing, subsequent encounter  J83 [id number]                      Subjective: Patient reports slow progress but progress  She notes some swelling in lateral ankle  Objective: See treatment diary below      Assessment: Tolerated treatment well  ROM improving slowly  Discussed performing stretches at home 2-3x for 2 min total ea  MaxA required to facilitate great toe ext without other digits  Unable to perform at first but better activation as we went on  Patient demonstrated fatigue post treatment and would benefit from continued PT      Plan: Progress treatment as tolerated         Precautions: HTN, ORIF on       Manuals 2/20 2/23 2/27 3/3 3/6 3/7 3/13 3/16 3/21 3/23   PROM R ankle 8' 12' 12' 12' 12' 10' 12' 12' 10' 10'   Grade I/II post mobs  4' 4' 4'         Gr  I joint distraction       3'                   Neuro Re-Ed             Pt education 5' 4'  4'         BAPS board ant/post, inv/ev  2x10 ea 2x10 ea 2x10 ea 2x10 ea        Ankle MREs  1x10 ea 1x10 ea 1x10 ea Inv/Ev  1x10 ea 5"        Towel scrunches         30x 30x   Great toe Ext         20x  20x active 10x ecc   Toe Yoga         3x 20x                Ther Ex             AROM R ankle 2x10 2x10 2x10 2x10 ea 2x10 ea  2x10 2x10 2x10 2x10 2x10    Ankle circles 2x10 2x10 2x10 2x10 ea 2x10 ea alphabet 1x alphabet 1x alphabet 1x alphabet 1x alphabet 1x   Long sitting calf stretch  1x10 10" 1x10 10" 1x10 10" 1x10   10" w/ bolster 1x10 10" w/ bolster under knee 1x10 10" w/ bolster under knee 6x15" w/ bolster under knee 6x 15" w/ bolster under knee 6x15"w/ bolster under knee   Ankle TBand    Peach df/pf  15x ea Peach DF/PF 20x ea Peach DF/PF  Inv/Ev  20x ea Peach DF/PF  Inv/Ev  20x ea Peach DF/PF  INV/EV  20x ea Peach DF/PF/INV/EV Peach DF/PF/INV/EV   Seated HR/TR    20x ea 20x ea        ProStretch    10"x10 seated  10"x10  10"x10 10"x10 seated 10"x10 seated 0"x5 seated 20"x6                Nustep       4' 5' 5' NV 5'   Ther Activity             Biodex weight shifting      5" hold at 100% on R 20x  No holds   100% on R 10x No holds 100% on R 20x No holds 100% on R 20x                 Gait Training                                       Modalities             CP      10 min post

## 2023-03-27 ENCOUNTER — OFFICE VISIT (OUTPATIENT)
Dept: PHYSICAL THERAPY | Facility: CLINIC | Age: 48
End: 2023-03-27

## 2023-03-27 ENCOUNTER — TELEPHONE (OUTPATIENT)
Dept: RADIOLOGY | Facility: CLINIC | Age: 48
End: 2023-03-27

## 2023-03-27 DIAGNOSIS — S82.891D CLOSED FRACTURE OF RIGHT ANKLE WITH ROUTINE HEALING, SUBSEQUENT ENCOUNTER: Primary | ICD-10-CM

## 2023-03-27 NOTE — PROGRESS NOTES
"Daily Note     Today's date: 3/27/2023  Patient name: Tj Burns  : 1975  MRN: [id number]  Referring provider: Maddi Salvador MD  Dx:   Encounter Diagnosis     ICD-10-CM    1  Closed fracture of right ankle with routine healing, subsequent encounter  N52 818M                      Subjective: Patient states she can slowly see the progress  Objective: See treatment diary below      Assessment: Tolerated treatment well  ROM slowly progressing, cont restriction in all planes  Able to activate great toe extensors with no assistance  Remains challenged with outlined program  Slightly improved tolerance to hold times with WBing on biodex  Initiated gait training to improve gait mechanics and speed and reduce compensations  She exhibited antalgic step to gait pattern with no UE support  Handrail utilized for safety and support as patient cont to exhibit antalgic gait  Cues required for proper heel-strike/toe off, as well as step through pattern  Patient demonstrated fatigue post treatment and would benefit from continued PT      Plan: Progress treatment as tolerated         Precautions: HTN, ORIF on       Manuals 3/27  2/27 3/3 3/6 3/7 3/13 3/16 3/21 3/23   PROM R ankle 10'  12' 12' 12' 10' 12' 12' 10' 10'   Grade I/II post mobs   4' 4'         Gr  I joint distraction       3'                   Neuro Re-Ed             Pt education    4'         BAPS board ant/post, inv/ev   2x10 ea 2x10 ea 2x10 ea        Ankle MREs   1x10 ea 1x10 ea Inv/Ev  1x10 ea 5\"        Towel scrunches 30x        30x 30x   Great toe Ext 20x active        20x  20x active 10x ecc   Toe Yoga         3x 20x                Ther Ex             AROM R ankle 2x10   2x10 2x10 ea 2x10 ea  2x10 2x10 2x10 2x10 2x10    Ankle circles Alphabet 1x   2x10 2x10 ea 2x10 ea alphabet 1x alphabet 1x alphabet 1x alphabet 1x alphabet 1x   Long sitting calf stretch 6x15\" w/ bolster under knee  1x10 10\" 1x10 10\" 1x10   10\" w/ bolster 1x10 10\" " "w/ bolster under knee 1x10 10\" w/ bolster under knee 6x15\" w/ bolster under knee 6x 15\" w/ bolster under knee 6x15\"w/ bolster under knee   Ankle TBand Peach DF/PF/INV/EV   Peach df/pf  15x ea Peach DF/PF 20x ea Peach DF/PF  Inv/Ev  20x ea Peach DF/PF  Inv/Ev  20x ea Peach DF/PF  INV/EV  20x ea Peach DF/PF/INV/EV Peach DF/PF/INV/EV   Seated HR/TR 20x 10#  HR   20x ea 20x ea        ProStretch 20\"x6 seated    10\"x10 seated  10\"x10  10\"x10 10\"x10 seated 10\"x10 seated 0\"x5 seated 20\"x6                Nustep  5'     4' 5' 5' NV 5'   Ther Activity             Biodex weight shifting No holds 100% on R 5\"x20      5\" hold at 100% on R 20x  No holds   100% on R 10x No holds 100% on R 20x No holds 100% on R 20x                 Gait Training             Ambulation in lace up brace At mirror w/ handrail 1 lap  w/ cues                         Modalities             CP      10 min post                         "

## 2023-03-30 ENCOUNTER — APPOINTMENT (OUTPATIENT)
Dept: PHYSICAL THERAPY | Facility: CLINIC | Age: 48
End: 2023-03-30

## 2023-03-30 DIAGNOSIS — S82.891A CLOSED FRACTURE OF RIGHT ANKLE, INITIAL ENCOUNTER: Primary | ICD-10-CM

## 2023-03-31 NOTE — TELEPHONE ENCOUNTER
Spoke with pharmacist  He said the medication went through the insurance  He will notify pt once ready for

## 2023-04-03 ENCOUNTER — OFFICE VISIT (OUTPATIENT)
Dept: PHYSICAL THERAPY | Facility: CLINIC | Age: 48
End: 2023-04-03

## 2023-04-03 DIAGNOSIS — S82.891D CLOSED FRACTURE OF RIGHT ANKLE WITH ROUTINE HEALING, SUBSEQUENT ENCOUNTER: Primary | ICD-10-CM

## 2023-04-03 NOTE — PROGRESS NOTES
"Daily Note     Today's date: 4/3/2023  Patient name: Armando Torrez  : 1975  MRN: [id number]  Referring provider: Herson Vasquez MD  Dx:   Encounter Diagnosis     ICD-10-CM    1  Closed fracture of right ankle with routine healing, subsequent encounter  A92 320P                      Subjective: Patient presents to PT 10 min late and was accommodated  She has slightly antalgic gait step through pattern  Objective: See treatment diary below      Assessment: Tolerated treatment well  Modified program due to subjective  Patient WBing tolerance slowly improving, able to increase hold times on biodex with minimal pain  SLS during ambulation able to adapt step through pattern  Increased resistance with DF/PF  Still challenged in resistance with inv/ev  Patient demonstrated fatigue post treatment and would benefit from continued PT      Plan: Continue per plan of care        Precautions: HTN, ORIF on       Manuals 3/27 4/3  3/3 3/6 3/7 3/13 3/16 3/21 3/23   PROM R ankle 10' 10'  12' 12' 10' 12' 12' 10' 10'   Grade I/II post mobs    4'         Gr  I joint distraction       3'                   Neuro Re-Ed             Pt education    4'         BAPS board ant/post, inv/ev    2x10 ea 2x10 ea        Ankle MREs    1x10 ea Inv/Ev  1x10 ea 5\"        Towel scrunches 30x 30x       30x 30x   Great toe Ext 20x active 20x active        20x  20x active 10x ecc   Toe Yoga  20x       3x 20x                Ther Ex             AROM R ankle 2x10    2x10 ea 2x10 ea  2x10 2x10 2x10 2x10 2x10    Ankle circles Alphabet 1x  alphabet 1x  2x10 ea 2x10 ea alphabet 1x alphabet 1x alphabet 1x alphabet 1x alphabet 1x   Long sitting calf stretch 6x15\" w/ bolster under knee 5x20\"  bolster under knee   1x10 10\" 1x10   10\" w/ bolster 1x10 10\" w/ bolster under knee 1x10 10\" w/ bolster under knee 6x15\" w/ bolster under knee 6x 15\" w/ bolster under knee 6x15\"w/ bolster under knee   Ankle 4-way  TBand Peach DF/PF/INV/EV OTB 20x ea  " "Peach df/pf  15x ea Peach DF/PF 20x ea Peach DF/PF  Inv/Ev  20x ea Peach DF/PF  Inv/Ev  20x ea Peach DF/PF  INV/EV  20x ea Peach DF/PF/INV/EV Peach DF/PF/INV/EV   Seated HR/TR 20x 10#  HR 20x HR  20x ea 20x ea        ProStretch 20\"x6 seated  20\"x6 seated   10\"x10 seated  10\"x10  10\"x10 10\"x10 seated 10\"x10 seated 0\"x5 seated 20\"x6                Nustep  5' time    4' 5' 5' NV 5'   Ther Activity             Biodex weight shifting No holds 100% on R 5\"x20  5-10\" holds 100% on R  20x    5\" hold at 100% on R 20x  No holds   100% on R 10x No holds 100% on R 20x No holds 100% on R 20x                 Gait Training             Ambulation in lace up brace At mirror w/ handrail 1 lap  w/ cues                         Modalities             CP      10 min post                         "

## 2023-04-04 ENCOUNTER — OFFICE VISIT (OUTPATIENT)
Dept: OBGYN CLINIC | Facility: CLINIC | Age: 48
End: 2023-04-04

## 2023-04-04 ENCOUNTER — APPOINTMENT (OUTPATIENT)
Dept: RADIOLOGY | Facility: CLINIC | Age: 48
End: 2023-04-04

## 2023-04-04 VITALS
WEIGHT: 236.2 LBS | DIASTOLIC BLOOD PRESSURE: 68 MMHG | HEIGHT: 64 IN | BODY MASS INDEX: 40.33 KG/M2 | HEART RATE: 76 BPM | SYSTOLIC BLOOD PRESSURE: 141 MMHG

## 2023-04-04 DIAGNOSIS — S82.891A CLOSED FRACTURE OF RIGHT ANKLE, INITIAL ENCOUNTER: ICD-10-CM

## 2023-04-04 DIAGNOSIS — S82.841A CLOSED BIMALLEOLAR FRACTURE OF RIGHT ANKLE, INITIAL ENCOUNTER: ICD-10-CM

## 2023-04-04 DIAGNOSIS — Z48.89 AFTERCARE FOLLOWING SURGERY: Primary | ICD-10-CM

## 2023-04-04 NOTE — PROGRESS NOTES
"Orthopaedics Office Visit - Post op follow up Patient Visit    ASSESSMENT/PLAN:    Assessment:   - 3 5 months ORIF right ankle bimalleolar equivalent fracture   - DOS 2022  - Pain, hypersensitivity and limited ROM, CRPS       Plan:   · X-rays were performed in the office and reviewed   · Advised to take Aleve prior to work, with food   · Continue PT, updated script was provided    · Updated work note was provided, 5 hrs shifts with frequent breaks   · Follow up in 6 weeks time for re-evaluation     To Do Next Visit:  Re-evaluation     _____________________________________________________  CHIEF COMPLAINT:  Chief Complaint   Patient presents with   • Right Ankle - Follow-up         SUBJECTIVE:  Soco Lopez is a 49 y o  female who presents to the office for a follow up regarding her right ankle  She is aprox  2 5 months s/p ORIF right ankle bimalleolar equivalent fracture, DOS 22  She was evaluated by Dr Juan Bennett on 3/20/23 due to concer for CRPS  At that time her Gabapentin was increased to 300 MG 3x a day and she was prescribed a topical ketamine based compounding cream  She states that she is mainly taking the medication at night  Dr Juan Bennett will also consider a lumbar sympathetic block pending response to medication  Camilo Arroyo continues to attend PT  She notes discomfort when walking  She notes she feels she is unable to run or perform toe raises  At the end of the day her ankle will swell and become more swollen  She feels her scar is still sensitive  She will wear the lace up ankle brace  She feels symptoms improved 85-90 percent       PAST MEDICAL HISTORY:  Past Medical History:   Diagnosis Date   • Allergic    • GERD (gastroesophageal reflux disease)    • Hypertension    • Sleep apnea     does not use cpap \"yet\"   • Sleep apnea, obstructive    • Varicella        PAST SURGICAL HISTORY:  Past Surgical History:   Procedure Laterality Date   •  SECTION      x2    • [place] OPEN TX DISTAL FIBULAR " FRACTURE LAT MALLEOLUS Right 12/29/2022    Procedure: OPEN REDUCTION W/ INTERNAL FIXATION (ORIF) RIGHT ANKLE BIMALL EQUIV FRACTURE;  Surgeon: Jm Oviedo MD;  Location: [facility];  Service: Orthopedics       FAMILY HISTORY:  Family History   Problem Relation Age of Onset   • Esophageal cancer Mother    • Heart disease Father    • No Known Problems Sister    • No Known Problems Daughter    • No Known Problems Maternal Grandmother    • No Known Problems Maternal Grandfather    • No Known Problems Paternal Grandmother    • No Known Problems Paternal Grandfather    • No Known Problems Sister    • No Known Problems Brother    • No Known Problems Brother    • No Known Problems Son    • No Known Problems Maternal Aunt    • No Known Problems Paternal Aunt    • No Known Problems Paternal Aunt    • No Known Problems Paternal Aunt    • No Known Problems Paternal Aunt    • No Known Problems Paternal Aunt        SOCIAL HISTORY:  Social History     Tobacco Use   • Smoking status: Never   • Smokeless tobacco: Never   Vaping Use   • Vaping Use: Never used   Substance Use Topics   • Alcohol use: Not Currently   • Drug use: Never       MEDICATIONS:    Current Outpatient Medications:   •  gabapentin (Neurontin) 300 mg capsule, Take 1 capsule (300 mg total) by mouth 3 (three) times a day, Disp: 90 capsule, Rfl: 1  •  Iron-Vitamin C (Vitron-C)  MG TABS, Take 1 tablet by mouth 2 (two) times a day, Disp: 60 tablet, Rfl: 3  •  levothyroxine 50 mcg tablet, Take 1 tablet (50 mcg total) by mouth daily, Disp: 90 tablet, Rfl: 1  •  meloxicam (Mobic) 15 mg tablet, Take 1 tablet (15 mg total) by mouth daily With food, Disp: 30 tablet, Rfl: 1  •  multivitamin (THERAGRAN) TABS, Take 1 tablet by mouth daily, Disp: , Rfl:   •  sertraline (ZOLOFT) 50 mg tablet, Take 1 tablet (50 mg total) by mouth daily, Disp: 90 tablet, Rfl: 5  •  VITAMIN D PO, Take by mouth, Disp: , Rfl:   •  aspirin (ECOTRIN LOW STRENGTH) 81 mg EC tablet, Take 1 "tablet (81 mg total) by mouth every 12 (twelve) hours, Disp: 84 tablet, Rfl: 0    ALLERGIES:  No Known Allergies    REVIEW OF SYSTEMS:  MSK: as noted in HPI  Neuro: WNL  Pertinent items are otherwise noted in HPI  A comprehensive review of systems was otherwise negative  LABS:  HgA1c: No results found for: HGBA1C  BMP:   Lab Results   Component Value Date    CALCIUM 8 4 12/27/2022    K 4 6 12/27/2022    CO2 28 12/27/2022     12/27/2022    BUN 12 12/27/2022    CREATININE 0 82 12/27/2022     CBC: No components found for: CBC    _____________________________________________________  PHYSICAL EXAMINATION:  Vital signs: /68   Pulse 76   Ht 5' 4\" (1 626 m)   Wt 107 kg (236 lb 3 2 oz)   BMI 40 54 kg/m²   General: No acute distress, awake and alert  Psychiatric: Mood and affect appear appropriate  HEENT: Trachea Midline, No torticollis, no apparent facial trauma  Cardiovascular: No audible murmurs; Extremities appear perfused  Pulmonary: No audible wheezing or stridor  Skin: No open lesions; see further details (if any) below    MUSCULOSKELETAL EXAMINATION:    Extremities:  Right ankle     No erythema or ecchymosis   Mild edema  No skin changes noted   No tempeture changes noted   Tenderness/hypersensitivity along well healed incision  Ankle ROM WNL's   Ambulates without assistance   Extremity appears warm and well perfused      _____________________________________________________  STUDIES REVIEWED:  I personally reviewed the images and interpretation is as follows:  X-rays of the right ankle demonstrate s/p ORIF  Fracture is healed  No evidence of hardware failure or loosening  PROCEDURES PERFORMED:  Procedures      Scribe Attestation    I,:  Brendan Mccauley am acting as a scribe while in the presence of the attending physician :       I,:  Aleena Arrieta MD personally performed the services described in this documentation    as scribed in my presence  :           "

## 2023-04-04 NOTE — LETTER
April 4, 2023     Patient: Dashawn Espinoza  YOB: 1975  Date of Visit: 4/4/2023      To Whom it May Concern:    Dashawn Espinoza is under my professional care  Lian Bone was seen in my office on 4/4/2023  Lian Smitha may return to work with limitations, 5 hour shift, allow for frequent breaks  She will be re-evaluated in 6 weeks time  If you have any questions or concerns, please don't hesitate to call           Sincerely,          Dilcia Salazar MD

## 2023-04-05 ENCOUNTER — TELEPHONE (OUTPATIENT)
Dept: OBGYN CLINIC | Facility: HOSPITAL | Age: 48
End: 2023-04-05

## 2023-04-05 ENCOUNTER — TELEPHONE (OUTPATIENT)
Dept: PAIN MEDICINE | Facility: CLINIC | Age: 48
End: 2023-04-05

## 2023-04-05 NOTE — LETTER
April 6, 2023     Patient: Tania Coleman  YOB: 1975  Date of Visit: 4/5/2023      To Whom it May Concern:    Tania Coleman is under my professional care  Valentín Montague should be allowed sitting breaks every 2 hrs if needed for 10 minutes    If you have any questions or concerns, please don't hesitate to call           Sincerely,          Tanja Vasquez MD        CC: No Recipients

## 2023-04-05 NOTE — TELEPHONE ENCOUNTER
Caller: Manoj Gallo PT    Doctor/Office: Dr Kenisha Joel     Call regarding :  Med question      Call was transferred to: Nurse

## 2023-04-05 NOTE — TELEPHONE ENCOUNTER
S/W pt who states everything needs to go through [employer] at [place] and advised same  They will call pt for adjusters information to get approval  Will f/u 4/6

## 2023-04-05 NOTE — TELEPHONE ENCOUNTER
Caller: Patient     Doctor: Madonna Begum    Reason for call: Patients employer is calling to request an updated note to specify how many breaks she needs per shift, and how long of a break for each break      Call back#: patient will [facility] in [facility]

## 2023-04-05 NOTE — TELEPHONE ENCOUNTER
Caller: pt    Doctor: Rashid Clarke     Reason for call: Pt is calling stating that she never got the script for the cream Dr Rashid Clarke was telling her about  She wants to know if it was delivered to the office or if he was sending it to the pharmacy?     Call back#: [phone]    [address] [place] [address] [id number] [facility], [address] [place] [address]

## 2023-04-05 NOTE — TELEPHONE ENCOUNTER
S/W Lucas Ren at [facility] ordered was not covered by insurance and will have a high copay of $220  Tech is trying to come up with a similar formulation that ins will cover  Once determined, fax will be sent to back fax for SP to sign

## 2023-04-06 ENCOUNTER — TELEPHONE (OUTPATIENT)
Dept: OBGYN CLINIC | Facility: HOSPITAL | Age: 48
End: 2023-04-06

## 2023-04-06 NOTE — TELEPHONE ENCOUNTER
S/W pt, SC did reach out to her and got the  information for her WC to put cream through  Advised to CB with issues

## 2023-04-06 NOTE — TELEPHONE ENCOUNTER
Caller: [place]    Doctor/Office: Shailesh Valladares    CB#: [phone]      What needs to be faxed: AVS and work note    ATTN to: Shelly Isbell    Fax#: [phone]      Documents were successfully e-faxed

## 2023-04-13 ENCOUNTER — APPOINTMENT (OUTPATIENT)
Dept: PHYSICAL THERAPY | Facility: CLINIC | Age: 48
End: 2023-04-13

## 2023-04-24 ENCOUNTER — OFFICE VISIT (OUTPATIENT)
Dept: PHYSICAL THERAPY | Facility: CLINIC | Age: 48
End: 2023-04-24

## 2023-04-24 DIAGNOSIS — S82.891D CLOSED FRACTURE OF RIGHT ANKLE WITH ROUTINE HEALING, SUBSEQUENT ENCOUNTER: Primary | ICD-10-CM

## 2023-04-24 NOTE — PROGRESS NOTES
"Daily Note     Today's date: 2023  Patient name: Yvette Cheung  : 1975  MRN: [id number]  Referring provider: Curt Hill MD  Dx:   Encounter Diagnosis     ICD-10-CM    1  Closed fracture of right ankle with routine healing, subsequent encounter  S82 891D           Start Time: 1630  Stop Time: 6556  Total time in clinic (min): 45 minutes    Subjective: Pt says her ankle was \"pretty okay\" today  Objective: See treatment diary below      Assessment: Tolerated treatment well  Patient demonstrated fatigue post treatment, exhibited good technique with therapeutic exercises and would benefit from continued PT  Pt exercise program kept consistent due to pt being adequately challenged and noting subjective fatigue with performance, not ready to progress at this time  Performed some light joint distraction to help improve ankle ROM in all planes, pt tolerated well with occasional minimal discomfort, traction was adjusted accordingly  Plan: Continue per plan of care  Progress treatment as tolerated  Precautions: HTN, ORIF on      10  Manuals 3/27 4/3 4/10 4/17 4/20  4/24           PROM R ankle 10' 10' 10' 10'  10'  6'           Grade I/II post mobs                       Gr   I joint distraction            2'                                   Neuro Re-Ed                       Pt education     Scar massage 4'  4'  4'             BAPS board ant/post, inv/ev                       Ankle MREs                       Towel scrunches 30x 30x 30x 30x  30x  30x           Great toe Ext 20x active 20x active  20x  20x  20x  20x           Toe Yoga   20x 20x 20x  20x 20x                                   Ther Ex                       AROM R ankle 2x10                      Ankle circles Alphabet 1x  alphabet 1x alphabet 1x alphabet 1x  alphabet 1x  alphabet 1x           Long sitting calf stretch 6x15\" w/ bolster under knee 5x20\"  bolster under knee  5x 20\" bolster under knee 5x 20\" bolster under " "knee  20\"x6 bolster under knee   20\"x6 bolster under knee           Ankle 4-way  TBand Peach DF/PF/INV/EV OTB 20x ea OTB 20x ea OTB 20x ea  OTB 20x ea  OTB 20x ea           Seated HR/TR 20x 10#  HR 20x HR 20x ea 20x ea  20x ea  20x ea           ProStretch 20\"x6 seated  20\"x6 seated  20\"x6  Seated 20\"x6  Seated  20\"x6 seated  20\"x6 seated           Mini static lunge on foam     NV?                 Nustep  5' time time 11'  5'  5'           Ther Activity                       Biodex weight shifting No holds 100% on R 5\"x20  5-10\" holds 100% on R  20x 10\"x20  100% on R  10\"x20  100% on R, fwd/bwd  10\"x20 100% on R, fwd/bwd  10\"x20 100% on R, fwd/bwd                                   Gait Training                       Ambulation in lace up brace At mirror w/ handrail 1 lap  w/ cues                                             Modalities                       CP                                                          "

## 2023-04-27 ENCOUNTER — TELEPHONE (OUTPATIENT)
Age: 48
End: 2023-04-27

## 2023-04-27 ENCOUNTER — OFFICE VISIT (OUTPATIENT)
Dept: PHYSICAL THERAPY | Facility: CLINIC | Age: 48
End: 2023-04-27

## 2023-04-27 DIAGNOSIS — S82.891D CLOSED FRACTURE OF RIGHT ANKLE WITH ROUTINE HEALING, SUBSEQUENT ENCOUNTER: Primary | ICD-10-CM

## 2023-04-27 NOTE — PROGRESS NOTES
Daily Note     Today's date: 2023  Patient name: Ryan Lee  : 1975  MRN: [id number]  Referring provider: Lizeth Hewitt MD  Dx:   Encounter Diagnosis     ICD-10-CM    1  Closed fracture of right ankle with routine healing, subsequent encounter  Q90 661O                      Subjective: Patient reports cont pain in ankle, most of her pain is posterior near achilles when she bears weight on it  Objective: See treatment diary below      Assessment: Tolerated treatment well  Able to progress as charted with minimal increase in pain from subjective baseline  She has lateral ankle pain near incision with inversion and sharp pain in achilles with end range dorsiflexion  Added gastroc stretch to address tightness in achilles  Progresses resistance with ankle 4 way with good challenge  She performs with decreased ROM unclear whether this is a strength deficit or due to pain or possibly fear of pain  Discussed performing exercises at home, updated HEP to include intrinsic foot exercises and ankle 4-way  Patient gave verbal understanding of performance and dosage  Patient demonstrated fatigue post treatment and would benefit from continued PT      Plan: Progress treatment as tolerated  Precautions: HTN, ORIF on      10  Manuals 3/27 4/3 4/10 4/17 4/20  4/24  4/27         PROM R ankle 10' 10' 10' 10'  10'  6'  10'          Grade I/II post mobs                       Gr   I joint distraction            2'                                   Neuro Re-Ed                       Pt education     Scar massage 4'  4'  4'    scar massage 4'         BAPS board ant/post, inv/ev                       Ankle MREs                       Towel scrunches 30x 30x 30x 30x  30x  30x  HEP         Great toe Ext 20x active 20x active  20x  20x  20x  20x  HEP         Toe Yoga   20x 20x 20x  20x 20x  HEP                                 Ther Ex                       AROM R ankle 2x10                      Ankle "circles Alphabet 1x  alphabet 1x alphabet 1x alphabet 1x  alphabet 1x  alphabet 1x  alphabet 1x         Long sitting calf stretch 6x15\" w/ bolster under knee 5x20\"  bolster under knee  5x 20\" bolster under knee 5x 20\" bolster under knee  20\"x6 bolster under knee   20\"x6 bolster under knee  +gastroc 20\"x3  [id number]\"x6 bolster under knee         Ankle 4-way  TBand Peach DF/PF/INV/EV OTB 20x ea OTB 20x ea OTB 20x ea  OTB 20x ea  OTB 20x ea  GTB 20x ea         Seated HR/TR 20x 10#  HR 20x HR 20x ea 20x ea  20x ea  20x ea  NV         ProStretch 20\"x6 seated  20\"x6 seated  20\"x6  Seated 20\"x6  Seated  20\"x6 seated  20\"x6 seated  standing 20\"x6         Mini static lunge on foam     NV?                 Nustep  5' time time 11'  5'  5'  5'         Ther Activity                       Biodex weight shifting No holds 100% on R 5\"x20  5-10\" holds 100% on R  20x 10\"x20  100% on R  10\"x20  100% on R, fwd/bwd  10\"x20 100% on R, fwd/bwd  10\"x20 100% on R, fwd/bwd  10\"x20 100% on R                                 Gait Training                       Ambulation in lace up brace At mirror w/ handrail 1 lap  w/ cues                                             Modalities                       CP                                                            "

## 2023-04-28 ENCOUNTER — OFFICE VISIT (OUTPATIENT)
Dept: FAMILY MEDICINE CLINIC | Facility: CLINIC | Age: 48
End: 2023-04-28

## 2023-04-28 VITALS
RESPIRATION RATE: 14 BRPM | SYSTOLIC BLOOD PRESSURE: 110 MMHG | HEIGHT: 64 IN | HEART RATE: 85 BPM | TEMPERATURE: 97.2 F | WEIGHT: 235 LBS | DIASTOLIC BLOOD PRESSURE: 80 MMHG | BODY MASS INDEX: 40.12 KG/M2 | OXYGEN SATURATION: 97 %

## 2023-04-28 DIAGNOSIS — F41.9 ANXIETY AND DEPRESSION: ICD-10-CM

## 2023-04-28 DIAGNOSIS — E03.9 ACQUIRED HYPOTHYROIDISM: ICD-10-CM

## 2023-04-28 DIAGNOSIS — Z00.00 HEALTH CARE MAINTENANCE: ICD-10-CM

## 2023-04-28 DIAGNOSIS — Z00.00 ANNUAL PHYSICAL EXAM: Primary | ICD-10-CM

## 2023-04-28 DIAGNOSIS — F32.A ANXIETY AND DEPRESSION: ICD-10-CM

## 2023-04-28 DIAGNOSIS — Z13.220 LIPID SCREENING: ICD-10-CM

## 2023-04-28 DIAGNOSIS — N92.0 MENORRHAGIA WITH REGULAR CYCLE: ICD-10-CM

## 2023-04-28 RX ORDER — SEMAGLUTIDE 0.25 MG/.5ML
INJECTION, SOLUTION SUBCUTANEOUS
COMMUNITY
Start: 2023-04-26

## 2023-04-28 NOTE — ASSESSMENT & PLAN NOTE
Annual physical completed  Patient had injury to her right ankle is completing physical therapy return to work part-time  Does have appointment to see GYN  Needs to get mammogram done  Is have menorrhagia   will check iron level  Discussed options for management  Discussed weight loss  Currently patient has problems with mobility  Maintain low-salt heart healthy diet    Please exercise activity as tolerated

## 2023-04-28 NOTE — PROGRESS NOTES
ADULT ANNUAL [facility] PRIMARY CARE    NAME: Sheila Shore  AGE: 49 y o  SEX: female  : 1975     DATE: 2023     Assessment and Plan:     Problem List Items Addressed This Visit        Endocrine    Hypothyroidism    Relevant Orders    TSH, 3rd generation with Free T4 reflex       Other    Annual physical exam - Primary     Annual physical completed  Patient had injury to her right ankle is completing physical therapy return to work part-time  Does have appointment to see GYN  Needs to get mammogram done  Is have menorrhagia   will check iron level  Discussed options for management  Discussed weight loss  Currently patient has problems with mobility  Maintain low-salt heart healthy diet  Please exercise activity as tolerated         Anxiety and depression    Relevant Medications    Wegovy 0 25 MG/0 5ML    Other Relevant Orders    Vitamin D 25 hydroxy   Other Visit Diagnoses     Lipid screening        Relevant Orders    Lipid panel    Health care maintenance        Relevant Orders    CBC and differential    Comprehensive metabolic panel    Menorrhagia with regular cycle        Relevant Orders    Iron Panel (Includes Ferritin, Iron Sat%, Iron, and TIBC)          Immunizations and preventive care screenings were discussed with patient today  Appropriate education was printed on patient's after visit summary  Counseling:  Alcohol/drug use: discussed moderation in alcohol intake, the recommendations for healthy alcohol use, and avoidance of illicit drug use  Dental Health: discussed importance of regular tooth brushing, flossing, and dental visits  Injury prevention: discussed safety/seat belts, safety helmets, smoke detectors, carbon dioxide detectors, and smoking near bedding or upholstery    Sexual health: discussed sexually transmitted diseases, partner selection, use of condoms, avoidance of unintended pregnancy, and contraceptive alternatives  Exercise: the importance of regular exercise/physical activity was discussed  Recommend exercise 3-5 times per week for at least 30 minutes  No follow-ups on file  Chief Complaint:     Chief Complaint   Patient presents with   • Physical Exam      History of Present Illness:     Adult Annual Physical   Patient here for a comprehensive physical exam  The patient reports problems - foot pain due to accident  Diet and Physical Activity  Diet/Nutrition: well balanced diet  Exercise: no formal exercise  Depression Screening  PHQ-2/9 Depression Screening    Little interest or pleasure in doing things: 0 - not at all  Feeling down, depressed, or hopeless: 0 - not at all  Trouble falling or staying asleep, or sleeping too much: 0 - not at all  Feeling tired or having little energy: 0 - not at all  Poor appetite or overeatin - not at all  Feeling bad about yourself - or that you are a failure or have let yourself or your family down: 0 - not at all  Trouble concentrating on things, such as reading the newspaper or watching television: 0 - not at all  Moving or speaking so slowly that other people could have noticed  Or the opposite - being so fidgety or restless that you have been moving around a lot more than usual: 0 - not at all  Thoughts that you would be better off dead, or of hurting yourself in some way: 0 - not at all  PHQ-9 Score: 0   PHQ-9 Interpretation: No or Minimal depression        General Health  Sleep: sleeps well  Hearing: normal - bilateral   Vision: most recent eye exam >1 year ago and wears glasses  Dental: regular dental visits and brushes teeth twice daily  /GYN Health  Patient is: perimenopausal  Last menstrual period: now  Contraceptive method: barrier methods  Review of Systems:     Review of Systems   Constitutional: Negative  HENT: Negative  Eyes: Negative  Respiratory: Negative  Cardiovascular: Negative  "  Gastrointestinal: Negative  Endocrine: Negative  Genitourinary: Negative  Musculoskeletal: Positive for arthralgias (Right ankle pain from injury)  Allergic/Immunologic: Negative  Neurological: Negative  Psychiatric/Behavioral: Negative         Past Medical History:     Past Medical History:   Diagnosis Date   • Allergic    • GERD (gastroesophageal reflux disease)    • Hypertension    • Sleep apnea     does not use cpap \"yet\"   • Sleep apnea, obstructive    • Varicella       Past Surgical History:     Past Surgical History:   Procedure Laterality Date   •  SECTION      x2    • AK OPEN TX DISTAL FIBULAR FRACTURE LAT MALLEOLUS Right 2022    Procedure: OPEN REDUCTION W/ INTERNAL FIXATION (ORIF) RIGHT ANKLE BIMALL EQUIV FRACTURE;  Surgeon: Rene Roberson MD;  Location: [facility];  Service: Orthopedics      Social History:     Social History     Socioeconomic History   • Marital status: /Civil Union     Spouse name: None   • Number of children: None   • Years of education: None   • Highest education level: None   Occupational History   • None   Tobacco Use   • Smoking status: Never   • Smokeless tobacco: Never   Vaping Use   • Vaping Use: Never used   Substance and Sexual Activity   • Alcohol use: Not Currently   • Drug use: Never   • Sexual activity: Yes     Partners: Male     Birth control/protection: None   Other Topics Concern   • None   Social History Narrative   • None     Social Determinants of Health     Financial Resource Strain: Not on file   Food Insecurity: Not on file   Transportation Needs: Not on file   Physical Activity: Not on file   Stress: Not on file   Social Connections: Not on file   Intimate Partner Violence: Not on file   Housing Stability: Not on file      Family History:     Family History   Problem Relation Age of Onset   • Esophageal cancer Mother    • Heart disease Father    • No Known Problems Sister    • No Known Problems Daughter    • No Known " "Problems Maternal Grandmother    • No Known Problems Maternal Grandfather    • No Known Problems Paternal Grandmother    • No Known Problems Paternal Grandfather    • No Known Problems Sister    • No Known Problems Brother    • No Known Problems Brother    • No Known Problems Son    • No Known Problems Maternal Aunt    • No Known Problems Paternal Aunt    • No Known Problems Paternal Aunt    • No Known Problems Paternal Aunt    • No Known Problems Paternal Aunt    • No Known Problems Paternal Aunt       Current Medications:     Current Outpatient Medications   Medication Sig Dispense Refill   • Diclofenac Sodium (VOLTAREN) 1 % Apply 2 g topically 4 (four) times a day 100 g 0   • gabapentin (Neurontin) 300 mg capsule Take 1 capsule (300 mg total) by mouth 3 (three) times a day 90 capsule 1   • Iron-Vitamin C (Vitron-C)  MG TABS Take 1 tablet by mouth 2 (two) times a day 60 tablet 3   • levothyroxine 50 mcg tablet Take 1 tablet (50 mcg total) by mouth daily 90 tablet 1   • meloxicam (Mobic) 15 mg tablet Take 1 tablet (15 mg total) by mouth daily With food 30 tablet 1   • multivitamin (THERAGRAN) TABS Take 1 tablet by mouth daily     • sertraline (ZOLOFT) 50 mg tablet Take 1 tablet (50 mg total) by mouth daily 90 tablet 5   • VITAMIN D PO Take by mouth     • Wegovy 0 25 MG/0 5ML      • aspirin (ECOTRIN LOW STRENGTH) 81 mg EC tablet Take 1 tablet (81 mg total) by mouth every 12 (twelve) hours 84 tablet 0     No current facility-administered medications for this visit  Allergies:     No Known Allergies   Physical Exam:     /80   Pulse 85   Temp (!) 97 2 °F (36 2 °C) (Temporal)   Resp 14   Ht 5' 4\" (1 626 m)   Wt 107 kg (235 lb)   SpO2 97%   BMI 40 34 kg/m²     Physical Exam  Constitutional:       General: She is not in acute distress  Appearance: She is obese  She is not ill-appearing  HENT:      Head: Normocephalic and atraumatic        Nose: Nose normal       Mouth/Throat:      Mouth: " Mucous membranes are moist    Eyes:      Pupils: Pupils are equal, round, and reactive to light  Cardiovascular:      Rate and Rhythm: Normal rate and regular rhythm  Pulses: Normal pulses  Heart sounds: Normal heart sounds  Pulmonary:      Effort: Pulmonary effort is normal  No respiratory distress  Breath sounds: Normal breath sounds  Chest:      Chest wall: No tenderness  Abdominal:      General: There is no distension  Palpations: There is no mass  Tenderness: There is no abdominal tenderness  Musculoskeletal:         General: Swelling and tenderness present  Normal range of motion  Cervical back: Normal range of motion and neck supple  Skin:     General: Skin is warm and dry  Neurological:      General: No focal deficit present  Mental Status: She is alert and oriented to person, place, and time  Psychiatric:         Mood and Affect: Mood normal          Behavior: Behavior normal          Thought Content: Thought content normal          Judgment: Judgment normal             SKYE Root  [facility] PRIMARY CARE  BMI Counseling: Body mass index is 40 34 kg/m²  The BMI is above normal  Nutrition recommendations include reducing portion sizes, decreasing overall calorie intake, 3-5 servings of fruits/vegetables daily, reducing fast food intake, consuming healthier snacks, decreasing soda and/or juice intake, moderation in carbohydrate intake, increasing intake of lean protein, reducing intake of saturated fat and trans fat and reducing intake of cholesterol

## 2023-04-28 NOTE — PATIENT INSTRUCTIONS
Obtain fasting labs, nothing to eat after midnight, may drink water  Wellness Visit for Adults   AMBULATORY CARE:   A wellness visit  is when you see your healthcare provider to get screened for health problems  Your healthcare provider will also give you advice on how to stay healthy  Write down your questions so you remember to ask them  Ask your healthcare provider how often you should have a wellness visit  What happens at a wellness visit:  Your healthcare provider will ask about your health, and your family history of health problems  This includes high blood pressure, heart disease, and cancer  He or she will ask if you have symptoms that concern you, if you smoke, and about your mood  You may also be asked about your intake of medicines, supplements, food, and alcohol  Any of the following may be done: Your weight  will be checked  Your height may also be checked so your body mass index (BMI) can be calculated  Your BMI shows if you are at a healthy weight  Your blood pressure  and heart rate will be checked  Your temperature may also be checked  Blood and urine tests  may be done  Blood tests may be done to check your cholesterol levels  Abnormal cholesterol levels increase your risk for heart disease and stroke  You may also need a blood or urine test to check for diabetes if you are at increased risk  Urine tests may be done to look for signs of an infection or kidney disease  A physical exam  includes checking your heartbeat and lungs with a stethoscope  Your healthcare provider may also check your skin to look for sun damage  Screening tests  may be recommended  A screening test is done to check for diseases that may not cause symptoms  The screening tests you may need depend on your age, gender, family history, and lifestyle habits  For example, colorectal screening may be recommended if you are 48years old or older      Screening tests you need if you are a woman:   A Pap smear  is used to screen for cervical cancer  Pap smears are usually done every 3 to 5 years depending on your age  You may need them more often if you have had abnormal Pap smear test results in the past  Ask your healthcare provider how often you should have a Pap smear  A mammogram  is an x-ray of your breasts to screen for breast cancer  Experts recommend mammograms every 2 years starting at age 48 years  You may need a mammogram at age 52 years or younger if you have an increased risk for breast cancer  Talk to your healthcare provider about when you should start having mammograms and how often you need them  Vaccines you may need:   Get an influenza vaccine  every year  The influenza vaccine protects you from the flu  Several types of viruses cause the flu  The viruses change over time, so new vaccines are made each year  Get a tetanus-diphtheria (Td) booster vaccine  every 10 years  This vaccine protects you against tetanus and diphtheria  Tetanus is a severe infection that may cause painful muscle spasms and lockjaw  Diphtheria is a severe bacterial infection that causes a thick covering in the back of your mouth and throat  Get a human papillomavirus (HPV) vaccine  if you are female and aged 23 to 32 or male 23 to 24 and never received it  This vaccine protects you from HPV infection  HPV is the most common infection spread by sexual contact  HPV may also cause vaginal, penile, and anal cancers  Get a pneumococcal vaccine  if you are aged 72 years or older  The pneumococcal vaccine is an injection given to protect you from pneumococcal disease  Pneumococcal disease is an infection caused by pneumococcal bacteria  The infection may cause pneumonia, meningitis, or an ear infection  Get a shingles vaccine  if you are 60 or older, even if you have had shingles before  The shingles vaccine is an injection to protect you from the varicella-zoster virus  This is the same virus that causes chickenpox  Shingles is a painful rash that develops in people who had chickenpox or have been exposed to the virus  How to eat healthy:  My Plate is a model for planning healthy meals  It shows the types and amounts of foods that should go on your plate  Fruits and vegetables make up about half of your plate, and grains and protein make up the other half  A serving of dairy is included on the side of your plate  The amount of calories and serving sizes you need depends on your age, gender, weight, and height  Examples of healthy foods are listed below:  Eat a variety of vegetables  such as dark green, red, and orange vegetables  You can also include canned vegetables low in sodium (salt) and frozen vegetables without added butter or sauces  Eat a variety of fresh fruits , canned fruit in 100% juice, frozen fruit, and dried fruit  Include whole grains  At least half of the grains you eat should be whole grains  Examples include whole-wheat bread, wheat pasta, brown rice, and whole-grain cereals such as oatmeal     Eat a variety of protein foods such as seafood (fish and shellfish), lean meat, and poultry without skin (turkey and chicken)  Examples of lean meats include pork leg, shoulder, or tenderloin, and beef round, sirloin, tenderloin, and extra lean ground beef  Other protein foods include eggs and egg substitutes, beans, peas, soy products, nuts, and seeds  Choose low-fat dairy products such as skim or 1% milk or low-fat yogurt, cheese, and cottage cheese  Limit unhealthy fats  such as butter, hard margarine, and shortening  Exercise:  Exercise at least 30 minutes per day on most days of the week  Some examples of exercise include walking, biking, dancing, and swimming  You can also fit in more physical activity by taking the stairs instead of the elevator or parking farther away from stores  Include muscle strengthening activities 2 days each week  Regular exercise provides many health benefits   It helps you manage your weight, and decreases your risk for type 2 diabetes, heart disease, stroke, and high blood pressure  Exercise can also help improve your mood  Ask your healthcare provider about the best exercise plan for you  General health and safety guidelines:   Do not smoke  Nicotine and other chemicals in cigarettes and cigars can cause lung damage  Ask your healthcare provider for information if you currently smoke and need help to quit  E-cigarettes or smokeless tobacco still contain nicotine  Talk to your healthcare provider before you use these products  Limit alcohol  A drink of alcohol is 12 ounces of beer, 5 ounces of wine, or 1½ ounces of liquor  Lose weight, if needed  Being overweight increases your risk of certain health conditions  These include heart disease, high blood pressure, type 2 diabetes, and certain types of cancer  Protect your skin  Do not sunbathe or use tanning beds  Use sunscreen with a SPF 15 or higher  Apply sunscreen at least 15 minutes before you go outside  Reapply sunscreen every 2 hours  Wear protective clothing, hats, and sunglasses when you are outside  Drive safely  Always wear your seatbelt  Make sure everyone in your car wears a seatbelt  A seatbelt can save your life if you are in an accident  Do not use your cell phone when you are driving  This could distract you and cause an accident  Pull over if you need to make a call or send a text message  Practice safe sex  Use latex condoms if are sexually active and have more than one partner  Your healthcare provider may recommend screening tests for sexually transmitted infections (STIs)  Wear helmets, lifejackets, and protective gear  Always wear a helmet when you ride a bike or motorcycle, go skiing, or play sports that could cause a head injury  Wear protective equipment when you play sports  Wear a lifejacket when you are on a boat or doing water sports      © Copyright [employer] 2022 Information is for End User's use only and may not be sold, redistributed or otherwise used for commercial purposes  The above information is an  only  It is not intended as medical advice for individual conditions or treatments  Talk to your doctor, nurse or pharmacist before following any medical regimen to see if it is safe and effective for you  Obesity   AMBULATORY CARE:   Obesity  means your body mass index (BMI) is greater than 30  Your healthcare provider will use your age, height, and weight to measure your BMI  The risks of obesity include  many health problems, including injuries or physical disability  Diabetes (high blood sugar level)    High blood pressure or high cholesterol    Heart disease    Stroke    Gallbladder or liver disease    Cancer of the colon, breast, prostate, liver, or kidney    Sleep apnea    Arthritis or gout    Screening  is done to check for health conditions before you have signs or symptoms  If you are 28to 79years old, your blood sugar level may be checked every 3 years for signs of prediabetes or diabetes  Your healthcare provider will check your blood pressure at each visit  High blood pressure can lead to a stroke or other problems  Your provider may check for signs of heart disease, cancer, or other health problems  Seek care immediately if:   You have a severe headache, confusion, or difficulty speaking  You have weakness on one side of your body  You have chest pain, sweating, or shortness of breath  Call your doctor if:   You have symptoms of gallbladder or liver disease, such as pain in your upper abdomen  You have knee or hip pain and discomfort while walking  You have symptoms of diabetes, such as intense hunger and thirst, and frequent urination  You have symptoms of sleep apnea, such as snoring or daytime sleepiness  You have questions or concerns about your condition or care      Treatment for obesity  focuses on helping you lose weight to improve your health  Even a small decrease in BMI can reduce the risk for many health problems  Your healthcare provider will help you set a weight-loss goal   Lifestyle changes  are the first step in treating obesity  These include making healthy food choices and getting regular physical activity  Your healthcare provider may suggest a weight-loss program that involves coaching, education, and therapy  Medicine  may help you lose weight when it is used with a healthy foods and physical activity  Surgery  can help you lose weight if you have obesity along with other health problems  Several types of weight-loss surgery are available  Ask your healthcare provider for more information  Tips for safe weight loss:   Set small, realistic goals  An example of a small goal is to walk for 20 minutes 5 days a week  Anther goal is to lose 5% of your body weight  Ask for support  Tell friends, family members, and coworkers about your goals  Ask someone to lose weight with you  You may also want to join a weight-loss support group  Identify foods or triggers that may cause you to overeat  Remove tempting high-calorie foods from your home and workplace  Place a bowl of fresh fruit on your kitchen counter  If stress causes you to eat, find other ways to cope with stress  A counselor or therapist may be able to help you  Track your daily calories and activity  Write down what you eat and drink  Also write down how many minutes of physical activity you do each day  Track your weekly weight  Weigh yourself in the morning, before you eat or drink anything but after you use the bathroom  Use the same scale, in the same place, and in similar clothing each time  Only weigh yourself 1 to 2 times each week, or as directed  You may become discouraged if you weigh yourself every day  Eating changes:   You will need to eat 500 to 1,000 fewer calories each day than you currently eat to lose 1 to 2 pounds a week  The following changes will help you cut calories:  Eat smaller portions  Use small plates, no larger than 9 inches in diameter  Fill your plate half full of fruits and vegetables  Measure your food using measuring cups until you know what a serving size looks like  Eat 3 meals and 1 or 2 snacks each day  Plan your meals in advance  Kasey Franco and eat at home most of the time  Eat slowly  Do not skip meals  Skipping meals can lead to overeating later in the day  This can make it harder for you to lose weight  Talk with a dietitian to help you make a meal plan and schedule that is right for you  Eat fruits and vegetables at every meal   They are low in calories and high in fiber, which makes you feel full  Do not add butter, margarine, or cream sauce to vegetables  Use herbs to season steamed vegetables  Eat less fat and fewer fried foods  Eat more baked or grilled chicken and fish  These protein sources are lower in calories and fat than red meat  Limit fast food  Dress your salads with olive oil and vinegar instead of bottled dressing  Limit the amount of sugar you eat  Do not drink sugary beverages  Limit alcohol  Activity changes:  Physical activity is good for your body in many ways  It helps you burn calories and build strong muscles  It decreases stress and depression, and improves your mood  It can also help you sleep better  Talk to your healthcare provider before you begin an exercise program   Exercise for at least 30 minutes 5 days a week  Start slowly  Set aside time each day for physical activity that you enjoy and that is convenient for you  It is best to do both weight training and an activity that increases your heart rate, such as walking, bicycling, or swimming  Find ways to be more active  Do yard work and housecleaning  Walk up the stairs instead of using elevators   Spend your leisure time going to events that require walking, such as outdoor festivals or fairs  This extra physical activity can help you lose weight and keep it off  Follow up with your doctor as directed: You may need to meet with a dietitian  Write down your questions so you remember to ask them during your visits  © Christine Carcamo 2022 Information is for End User's use only and may not be sold, redistributed or otherwise used for commercial purposes  The above information is an  only  It is not intended as medical advice for individual conditions or treatments  Talk to your doctor, nurse or pharmacist before following any medical regimen to see if it is safe and effective for you  Weight Management   AMBULATORY CARE:   Why it is important to manage your weight:  Being overweight increases your risk of health conditions such as heart disease, high blood pressure, type 2 diabetes, and certain types of cancer  It can also increase your risk for osteoarthritis, sleep apnea, and other respiratory problems  Aim for a slow, steady weight loss  Even a small amount of weight loss can lower your risk of health problems  Risks of being overweight:  Extra weight can cause many health problems, including the following:  Diabetes (high blood sugar level)    High blood pressure or high cholesterol    Heart disease    Stroke    Gallbladder or liver disease    Cancer of the colon, breast, prostate, liver, or kidney    Sleep apnea    Arthritis or gout    Screening  is done to check for health conditions before you have signs or symptoms  If you are 28to 79years old, your blood sugar level may be checked every 3 years for signs of prediabetes or diabetes  Your healthcare provider will check your blood pressure at each visit  High blood pressure can lead to a stroke or other problems  Your provider may check for signs of heart disease, cancer, or other health problems    How to lose weight safely:  A safe and healthy way to lose weight is to eat fewer calories and get regular exercise  You can lose up about 1 pound a week by decreasing the number of calories you eat by 500 calories each day  You can decrease calories by eating smaller portion sizes or by cutting out high-calorie foods  Read labels to find out how many calories are in the foods you eat  You can also burn calories with exercise such as walking, swimming, or biking  You will be more likely to keep weight off if you make these changes part of your lifestyle  Exercise at least 30 minutes per day on most days of the week  You can also fit in more physical activity by taking the stairs instead of the elevator or parking farther away from stores  Ask your healthcare provider about the best exercise plan for you  Healthy meal plan for weight management:  A healthy meal plan includes a variety of foods, contains fewer calories, and helps you stay healthy  A healthy meal plan includes the following:     Eat whole-grain foods more often  A healthy meal plan should contain fiber  Fiber is the part of grains, fruits, and vegetables that is not broken down by your body  Whole-grain foods are healthy and provide extra fiber in your diet  Some examples of whole-grain foods are whole-wheat breads and pastas, oatmeal, brown rice, and bulgur  Eat a variety of vegetables every day  Include dark, leafy greens such as spinach, kale, harlan greens, and mustard greens  Eat yellow and orange vegetables such as carrots, sweet potatoes, and winter squash  Eat a variety of fruits every day  Choose fresh or canned fruit (canned in its own juice or light syrup) instead of juice  Fruit juice has very little or no fiber  Eat low-fat dairy foods  Drink fat-free (skim) milk or 1% milk  Eat fat-free yogurt and low-fat cottage cheese  Try low-fat cheeses such as mozzarella and other reduced-fat cheeses  Choose meat and other protein foods that are low in fat    Choose beans or other legumes such as split peas or lentils  Choose fish, skinless poultry (chicken or turkey), or lean cuts of red meat (beef or pork)  Before you cook meat or poultry, cut off any visible fat  Use less fat and oil  Try baking foods instead of frying them  Add less fat, such as margarine, sour cream, regular salad dressing and mayonnaise to foods  Eat fewer high-fat foods  Some examples of high-fat foods include french fries, doughnuts, ice cream, and cakes  Eat fewer sweets  Limit foods and drinks that are high in sugar  This includes candy, cookies, regular soda, and sweetened drinks  Ways to decrease calories:   Eat smaller portions  Use a small plate with smaller servings  Do not eat second helpings  When you eat at a restaurant, ask for a box and place half of your meal in the box before you eat  Share an entrée with someone else  Replace high-calorie snacks with healthy, low-calorie snacks  Choose fresh fruit, vegetables, fat-free rice cakes, or air-popped popcorn instead of potato chips, nuts, or chocolate  Choose water or calorie-free drinks instead of soda or sweetened drinks  Do not shop for groceries when you are hungry  You may be more likely to make unhealthy food choices  Take a grocery list of healthy foods and shop after you have eaten  Eat regular meals  Do not skip meals  Skipping meals can lead to overeating later in the day  This can make it harder for you to lose weight  Eat a healthy snack in place of a meal if you do not have time to eat a regular meal  Talk with a dietitian to help you create a meal plan and schedule that is right for you  Other things to consider as you try to lose weight:   Be aware of situations that may give you the urge to overeat, such as eating while watching television  Find ways to avoid these situations  For example, read a book, go for a walk, or do crafts  Meet with a weight loss support group or friends who are also trying to lose weight   This may help you stay motivated to continue working on your weight loss goals  © Copyright [facility] 2022 Information is for End User's use only and may not be sold, redistributed or otherwise used for commercial purposes  The above information is an  only  It is not intended as medical advice for individual conditions or treatments  Talk to your doctor, nurse or pharmacist before following any medical regimen to see if it is safe and effective for you  Low Fat Diet   AMBULATORY CARE:   A low-fat diet  is an eating plan that is low in total fat, unhealthy fat, and cholesterol  You may need to follow a low-fat diet if you have trouble digesting or absorbing fat  You may also need to follow this diet if you have high cholesterol  You can also lower your cholesterol by increasing the amount of fiber in your diet  Soluble fiber is a type of fiber that helps to decrease cholesterol levels  Different types of fat in food:   Limit unhealthy fats  A diet that is high in cholesterol, saturated fat, and trans fat may cause unhealthy cholesterol levels  Unhealthy cholesterol levels increase your risk of heart disease  Cholesterol:  Limit intake of cholesterol to less than 200 mg per day  Cholesterol is found in meat, eggs, and dairy  Saturated fat:  Limit saturated fat to less than 7% of your total daily calories  Ask your dietitian how many calories you need each day  Saturated fat is found in butter, cheese, ice cream, whole milk, and palm oil  Saturated fat is also found in meat, such as beef, pork, chicken skin, and processed meats  Processed meats include sausage, hot dogs, and bologna  Trans fat:  Avoid trans fat as much as possible  Trans fat is used in fried and baked foods  Foods that say trans fat free on the label may still have up to 0 5 grams of trans fat per serving  Include healthy fats  Replace foods that are high in saturated and trans fat with foods high in healthy fats   This may help to decrease high cholesterol levels  Monounsaturated fats: These are found in avocados, nuts, and vegetable oils, such as olive, canola, and sunflower oil  Polyunsaturated fats: These can be found in vegetable oils, such as soybean or corn oil  Omega-3 fats can help to decrease the risk of heart disease  Omega-3 fats are found in fish, such as salmon, herring, trout, and tuna  Omega-3 fats can also be found in plant foods, such as walnuts, flaxseed, soybeans, and canola oil         Foods to limit or avoid:   Grains:      Snacks that are made with partially hydrogenated oils, such as chips, regular crackers, and butter-flavored popcorn    High-fat baked goods, such as biscuits, croissants, doughnuts, pies, cookies, and pastries    Dairy:      Whole milk, 2% milk, and yogurt and ice cream made with whole milk    Half and half creamer, heavy cream, and whipping cream    Cheese, cream cheese, and sour cream    Meats and proteins:      High-fat cuts of meat (T-bone steak, regular hamburger, and ribs)    Fried meat, poultry (turkey and chicken), and fish    Poultry (chicken and turkey) with skin    Cold cuts (salami or bologna), hot dogs, hector, and sausage    Whole eggs and egg yolks    Vegetables and fruits with added fat:      Fried vegetables or vegetables in butter or high-fat sauces, such as cream or cheese sauces    Fried fruit or fruit served with butter or cream    Fats:      Butter, stick margarine, and shortening    Coconut, palm oil, and palm kernel oil    Foods to include:       Grains:      Whole-grain breads, cereals, pasta, and brown rice    Low-fat crackers and pretzels    Vegetables and fruits:      Fresh, frozen, or canned vegetables (no salt or low-sodium)    Fresh, frozen, dried, or canned fruit (canned in light syrup or fruit juice)    Avocado    Low-fat dairy products:      Nonfat (skim) or 1% milk    Nonfat or low-fat cheese, yogurt, and cottage cheese    Meats and proteins:      Chicken or turkey with no skin    Baked or broiled fish    Lean beef and pork (loin, round, extra lean hamburger)    Beans and peas, unsalted nuts, soy products    Egg whites and substitutes    Seeds and nuts    Fats:      Unsaturated oil, such as canola, olive, peanut, soybean, or sunflower oil    Soft or liquid margarine and vegetable oil spread    Low-fat salad dressing  Other ways to decrease fat:   Read food labels before you buy foods  Choose foods that have less than 30% of calories from fat  Choose low-fat or fat-free dairy products  Remember that fat free does not mean calorie free  These foods still contain calories, and too many calories can lead to weight gain  Trim fat from meat and avoid fried food  Trim all visible fat from meat before you cook it  Remove the skin from poultry  Do not fields meat, fish, or poultry  Bake, roast, boil, or broil these foods instead  Avoid fried foods  Eat a baked potato instead of [place] fries  Steam vegetables instead of sautéing them in butter  Add less fat to foods  Use imitation hector bits on salads and baked potatoes instead of regular hector bits  Use fat-free or low-fat salad dressings instead of regular dressings  Use low-fat or nonfat butter-flavored topping instead of regular butter or margarine on popcorn and other foods  Ways to decrease fat in recipes:  Replace high-fat ingredients with low-fat or nonfat ones  This may cause baked goods to be drier than usual  You may need to use nonfat cooking spray on pans to prevent food from sticking  You also may need to change the amount of other ingredients, such as water, in the recipe  Try the following:  Use low-fat or light margarine instead of regular margarine or shortening  Use lean ground turkey breast or chicken, or lean ground beef (less than 5% fat) instead of hamburger  Add 1 teaspoon of canola oil to 8 ounces of skim milk instead of using cream or half and half       Use grated zucchini, carrots, or apples in breads instead of coconut  Use blenderized, low-fat cottage cheese, plain tofu, or low-fat ricotta cheese instead of cream cheese  Use 1 egg white and 1 teaspoon of canola oil, or use ¼ cup (2 ounces) of fat-free egg substitute instead of a whole egg  Replace half of the oil that is called for in a recipe with applesauce when you bake  Use 3 tablespoons of cocoa powder and 1 tablespoon of canola oil instead of a square of baking chocolate  How to increase fiber:  Eat enough high-fiber foods to get 20 to 30 grams of fiber every day  Slowly increase your fiber intake to avoid stomach cramps, gas, and other problems  Eat 3 ounces of whole-grain foods each day  An ounce is about 1 slice of bread  Eat whole-grain breads, such as whole-wheat bread  Whole wheat, whole-wheat flour, or other whole grains should be listed as the first ingredient on the food label  Replace white flour with whole-grain flour or use half of each in recipes  Whole-grain flour is heavier than white flour, so you may have to add more yeast or baking powder  Eat a high-fiber cereal for breakfast   Oatmeal is a good source of soluble fiber  Look for cereals that have bran or fiber in the name  Choose whole-grain products, such as brown rice, barley, and whole-wheat pasta  Eat more beans, peas, and lentils  For example, add beans to soups or salads  Eat at least 5 cups of fruits and vegetables each day  Eat fruits and vegetables with the peel because the peel is high in fiber  © [employer] 2022 Information is for End User's use only and may not be sold, redistributed or otherwise used for commercial purposes  The above information is an  only  It is not intended as medical advice for individual conditions or treatments  Talk to your doctor, nurse or pharmacist before following any medical regimen to see if it is safe and effective for you      Heart Healthy Diet   AMBULATORY CARE:   A heart healthy diet  is an eating plan low in unhealthy fats and sodium (salt)  The plan is high in healthy fats and fiber  A heart healthy diet helps improve your cholesterol levels and lowers your risk for heart disease and stroke  A dietitian will teach you how to read and understand food labels  Heart healthy diet guidelines to follow:   Choose foods that contain healthy fats:      Unsaturated fats  include monounsaturated and polyunsaturated fats  Unsaturated fat is found in foods such as soybean, canola, olive, corn, and safflower oils  It is also found in soft tub margarine that is made with liquid vegetable oil  Omega-3 fat  is found in certain fish, such as salmon, tuna, and trout, and in walnuts and flaxseed  Eat fish high in omega-3 fats at least 2 times a week  Limit or do not have unhealthy fats:      Cholesterol  is found in animal foods, such as eggs and lobster, and in dairy products made from whole milk  Limit cholesterol to less than 200 mg each day  Saturated fat  is found in meats, such as hector and hamburger  It is also found in chicken or turkey skin, whole milk, and butter  Limit saturated fat to less than 7% of your total daily calories  Trans fat  is found in packaged foods, such as potato chips and cookies  It is also in hard margarine, some fried foods, and shortening  Do not eat foods that contain trans fats  Get 20 to 30 grams of fiber each day  Fruits, vegetables, whole-grain foods, and legumes (cooked beans) are good sources of fiber  Limit sodium as directed  You may be told to limit sodium, such as to 2,000 mg or less each day  Choose low-sodium or no-salt-added foods  Add little or no salt to food you prepare  Use herbs and spices in place of salt         Include the following in your heart healthy plan:  Ask your dietitian or healthcare provider how many servings to have each day from the following food groups:  Grains:      Whole-wheat breads, cereals, and pastas, and brown rice    Low-fat, low-sodium crackers and chips    Vegetables:      Broccoli, green beans, green peas, and spinach    Collards, kale, and lima beans    Carrots, sweet potatoes, tomatoes, and peppers    Canned vegetables with no salt added    Fruits:      Bananas, peaches, pears, and pineapple    Grapes, raisins, and dates    Oranges, tangerines, grapefruit, orange juice, and grapefruit juice    Apricots, mangoes, melons, and papaya    Raspberries and strawberries    Canned fruit with no added sugar    Low-fat dairy:      Nonfat (skim) milk, 1% milk, and low-fat almond, cashew, or soy milks fortified with calcium    Low-fat cheese, regular or frozen yogurt, and cottage cheese    Meats and proteins:      Lean cuts of beef and pork (loin, leg, round), skinless chicken and turkey    Legumes, soy products, egg whites, or nuts    Limit or do not include the following in your heart healthy plan:   Foods and liquids that contain unhealthy fats and oils:      Whole or 2% milk, cream cheese, sour cream, or cheese    High-fat cuts of beef (T-bone steaks, ribs), chicken or turkey with skin, and organ meats such as liver    Butter, stick margarine, shortening, and cooking oils such as coconut or palm oil    Foods and liquids high in sodium:      Packaged foods, such as frozen dinners, cookies, macaroni and cheese, and cereals with more than 300 mg of sodium per serving    Vegetables with added sodium, such as instant potatoes, vegetables with added sauces, or regular canned vegetables    Cured or smoked meats, such as hot dogs, hector, and sausage    High-sodium ketchup, barbecue sauce, salad dressing, pickles, olives, soy sauce, or miso    Foods and liquids high in sugar:      Candy, cake, cookies, pies, or doughnuts    Soft drinks (soda), sports drinks, or sweetened tea    Canned or dry mixes for cakes, soups, sauces, or gravies    Other healthy heart guidelines:   Do not smoke    Nicotine and other chemicals in cigarettes and cigars can cause lung and heart damage  Ask your healthcare provider for information if you currently smoke and need help to quit  E-cigarettes or smokeless tobacco still contain nicotine  Talk to your provider before you use these products  Limit or do not drink alcohol as directed  Alcohol can damage your heart and raise your blood pressure  Your healthcare provider may give you specific daily and weekly limits  The general recommended limit is 1 drink a day for women 21 or older and for men 72 or older  Do not have more than 3 drinks within 24 hours or 7 within a week  The recommended limit is 2 drinks a day for men 24to 59years of age  Do not have more than 4 drinks within 24 hours or 14 within a week  A drink of alcohol is 12 ounces of beer, 5 ounces of wine, or 1½ ounces of liquor  Maintain a healthy weight  Extra body weight makes your heart work harder  Ask your provider what a healthy weight is for you  He or she can help you create a safe weight loss plan, if needed  Exercise regularly  Exercise can help you maintain a healthy weight and improve your blood pressure and cholesterol levels  Regular exercise can also decrease your risk for heart problems  Ask your provider about the best exercise plan for you  Do not start an exercise program without asking your provider  Follow up with your doctor or cardiologist as directed:  Write down your questions so you remember to ask them during your visits  © Copyright [employer] 2022 Information is for End User's use only and may not be sold, redistributed or otherwise used for commercial purposes  The above information is an  only  It is not intended as medical advice for individual conditions or treatments  Talk to your doctor, nurse or pharmacist before following any medical regimen to see if it is safe and effective for you      Calorie Counting Diet   WHAT YOU NEED TO KNOW:   What is a calorie counting diet? It is a meal plan based on counting calories each day to reach a healthy body weight  You will need to eat fewer calories if you are trying to lose weight  Weight loss may decrease your risk for certain health problems or improve your health if you have health problems  Some of these health problems include heart disease, high blood pressure, and diabetes  What foods should I avoid? Your dietitian will tell you if you need to avoid certain foods based on your body weight and health condition  You may need to avoid high-fat foods if you are at risk for or have heart disease  You may need to eat fewer foods from the breads and starches food group if you have diabetes  How many calories are in foods? The following is a list of foods and drinks with the approximate number of calories in each  Check the food label to find the exact number of calories  A dietitian can tell you how many calories you should have from each food group each day    Carbohydrate:      ½ of a 3-inch bagel, 1 slice of bread, or ½ of a hamburger bun or hot dog bun (80)    1 (8-inch) flour tortilla or ½ cup of cooked rice (100)    1 (6-inch) corn tortilla (80)    1 (6-inch) pancake or 1 cup of bran flakes cereal (110)    ½ cup of cooked cereal (80)    ½ cup of cooked pasta (85)    1 ounce of pretzels (100)    3 cups of air-popped popcorn without butter or oil (80)    Dairy:      1 cup of skim or 1% milk (90)    1 cup of 2% milk (120)    1 cup of whole milk (160)    1 cup of 2% chocolate milk (220)    1 ounce of low-fat cheese with 3 grams of fat per ounce (70)    1 ounce of cheddar cheese (114)    ½ cup of 1% fat cottage cheese (80)    1 cup of plain or sugar-free, fat-free yogurt (90)    Protein foods:      3 ounces of fish (not breaded or fried) (95)    3 ounces of breaded, fried fish (195)    ¾ cup of tuna canned in water (105)    3 ounces of chicken breast without skin (105)    1 fried chicken breast with skin (350)    ¼ cup of fat free egg substitute (40)    1 large egg (75)    3 ounces of lean beef or pork (165)    3 ounces of fried pork chop or ham (185)    ½ cup of cooked dried beans, such as kidney, pastrana, lentils, or navy (115)    3 ounces of bologna or lunch meat (225)    2 links of breakfast sausage (140)    Vegetables:      ½ cup of sliced mushrooms (10)    1 cup of salad greens, such as lettuce, spinach, or kenzie (15)    ½ cup of steamed asparagus (20)    ½ cup of cooked summer squash, zucchini squash, or green or wax beans (25)    1 cup of broccoli or cauliflower florets, or 1 medium tomato (25)    1 large raw carrot or ½ cup of cooked carrots (40)    ? of a medium cucumber or 1 stalk of celery (5)    1 small baked potato (160)    1 cup of breaded, fried vegetables (230)    Fruit:      1 (6-inch) banana (55)     ½ of a 4-inch grapefruit (55)    15 grapes (60)    1 medium orange or apple (70)    1 large peach (65)    1 cup of fresh pineapple chunks (75)    1 cup of melon cubes (50)    1¼ cups of whole strawberries (45)    ½ cup of fruit canned in juice (55)    ½ cup of fruit canned in heavy syrup (110)    ?  cup of raisins (130)    ½ cup of unsweetened fruit juice (60)    ½ cup of grape, cranberry, or prune juice (90)    Fat:      10 peanuts or 2 teaspoons of peanut butter (55)    2 tablespoons of avocado or 1 tablespoon of regular salad dressing (45)    2 slices of hector (90)    1 teaspoon of oil, such as safflower, canola, corn, or olive oil (45)    2 teaspoons of low-fat margarine, or 1 tablespoon of low-fat mayonnaise (50)    1 teaspoon of regular margarine (40)    1 tablespoon of regular mayonnaise (135)    1 tablespoon of cream cheese or 2 tablespoons of low-fat cream cheese (45)    2 tablespoons of vegetable shortening (215)    Dessert and sweets:      8 animal crackers or 5 vanilla wafers (80)    1 frozen fruit juice bar (80)    ½ cup of ice milk or low-fat frozen yogurt (90)    ½ cup of sherbet or sorbet (125)    ½ cup of sugar-free pudding or custard (60)    ½ cup of ice cream (140)    ½ cup of pudding or custard (175)    1 (2-inch) square chocolate brownie (185)    Combination foods:      Bean burrito made with an 8-inch tortilla, without cheese (275)    Chicken breast sandwich with lettuce and tomato (325)    1 cup of chicken noodle soup (60)    1 beef taco (175)    Regular hamburger with lettuce and tomato (310)    Regular cheeseburger with lettuce and tomato (410)     ¼ of a 12-inch cheese pizza (280)    Fried fish sandwich with lettuce and tomato (425)    Hot dog and bun (275)    1½ cups of macaroni and cheese (310)    Taco salad with a fried tortilla shell (870)    Low-calorie foods:      1 tablespoon of ketchup or 1 tablespoon of fat free sour cream (15)    1 teaspoon of mustard (5)    ¼ cup of salsa (20)    1 large dill pickle (15)    1 tablespoon of fat free salad dressing (10)    2 teaspoons of low-sugar, light jam or jelly, or 1 tablespoon of sugar-free syrup (15)    1 sugar-free popsicle (15)    1 cup of club soda, seltzer water, or diet soda (0)    CARE AGREEMENT:   You have the right to help plan your care  Discuss treatment options with your healthcare provider to decide what care you want to receive  You always have the right to refuse treatment  The above information is an  only  It is not intended as medical advice for individual conditions or treatments  Talk to your doctor, nurse or pharmacist before following any medical regimen to see if it is safe and effective for you  © Copyright [employer] 2022 Information is for End User's use only and may not be sold, redistributed or otherwise used for commercial purposes

## 2023-05-01 ENCOUNTER — OFFICE VISIT (OUTPATIENT)
Dept: PHYSICAL THERAPY | Facility: CLINIC | Age: 48
End: 2023-05-01

## 2023-05-01 DIAGNOSIS — S82.891D CLOSED FRACTURE OF RIGHT ANKLE WITH ROUTINE HEALING, SUBSEQUENT ENCOUNTER: Primary | ICD-10-CM

## 2023-05-01 NOTE — PROGRESS NOTES
"Daily Note     Today's date: 2023  Patient name: Nazario Covarrubias  : 1975  MRN: [id number]  Referring provider: Parish Sumner MD  Dx:   Encounter Diagnosis     ICD-10-CM    1  Closed fracture of right ankle with routine healing, subsequent encounter  [id number]                      Subjective: Patient cont to amb with antalgic gait  Objective: See treatment diary below      Assessment: Cont to demonstrate decreased ankle ROM  She amb with antalgic gait pattern and increased toe out to decrease pressure in ankle  She has sharp pain when she steps, up achilles into calf  She is able to complete table and seated exercises with minimal increase from baseline  Patient demonstrated fatigue post treatment and would benefit from continued PT      Plan: Progress treatment as tolerated  Precautions: HTN, ORIF on      Manuals 3/27 4/3 4/10 4/17 4/20  4/24  4/27  5/1       PROM R ankle 10' 10' 10' 10'  10'  6'  10'  NV       Grade I/II post mobs                       Gr   I joint distraction            2'                                   Neuro Re-Ed                       Pt education     Scar massage 4'  4'  4'    scar massage 4'         BAPS board ant/post, inv/ev                       Ankle MREs                       Towel scrunches 30x 30x 30x 30x  30x  30x  HEP         Great toe Ext 20x active 20x active  20x  20x  20x  20x  HEP         Toe Yoga   20x 20x 20x  20x 20x  HEP                                 Ther Ex                       AROM R ankle 2x10                      Ankle circles Alphabet 1x  alphabet 1x alphabet 1x alphabet 1x  alphabet 1x  alphabet 1x  alphabet 1x  alphabet 1x       Long sitting calf stretch 6x15\" w/ bolster under knee 5x20\"  bolster under knee  5x 20\" bolster under knee 5x 20\" bolster under knee  20\"x6 bolster under knee   20\"x6 bolster under knee  +gastroc 20\"x3  [id number]\"x6 bolster under knee  gastroc   4x30\"       Ankle 4-way  TBand Peach DF/PF/INV/EV OTB 20x " "ea OTB 20x ea OTB 20x ea  OTB 20x ea  OTB 20x ea  GTB 20x ea  GTB 20x ea        Seated HR/TR 20x 10#  HR 20x HR 20x ea 20x ea  20x ea  20x ea  NV  20x ea  6#       ProStretch 20\"x6 seated  20\"x6 seated  20\"x6  Seated 20\"x6  Seated  20\"x6 seated  20\"x6 seated  standing 20\"x6  standing 20\"x6       Mini static lunge on foam     NV?                 Nustep  5' time time 11'  5'  5'  5'  NV       Ther Activity                       Biodex weight shifting No holds 100% on R 5\"x20  5-10\" holds 100% on R  20x 10\"x20  100% on R  10\"x20  100% on R, fwd/bwd  10\"x20 100% on R, fwd/bwd  10\"x20 100% on R, fwd/bwd  10\"x20 100% on R  10\"x10 lat 100%  5\"x10 fwd/bwd  on R        Tandem walking                at mirror 1 lap       Gait Training                       Ambulation in lace up brace At mirror w/ handrail 1 lap  w/ cues                                             Modalities                       CP                                                              "

## 2023-05-03 ENCOUNTER — LAB (OUTPATIENT)
Dept: LAB | Facility: CLINIC | Age: 48
End: 2023-05-03

## 2023-05-03 DIAGNOSIS — N92.0 MENORRHAGIA WITH REGULAR CYCLE: ICD-10-CM

## 2023-05-03 DIAGNOSIS — F32.A ANXIETY AND DEPRESSION: ICD-10-CM

## 2023-05-03 DIAGNOSIS — E03.9 ACQUIRED HYPOTHYROIDISM: ICD-10-CM

## 2023-05-03 DIAGNOSIS — F41.9 ANXIETY AND DEPRESSION: ICD-10-CM

## 2023-05-03 DIAGNOSIS — Z13.220 LIPID SCREENING: ICD-10-CM

## 2023-05-03 DIAGNOSIS — Z00.00 HEALTH CARE MAINTENANCE: ICD-10-CM

## 2023-05-03 LAB
25(OH)D3 SERPL-MCNC: 24.6 NG/ML (ref 30–100)
ALBUMIN SERPL BCP-MCNC: 3.9 G/DL (ref 3.5–5)
ALP SERPL-CCNC: 112 U/L (ref 46–116)
ALT SERPL W P-5'-P-CCNC: 23 U/L (ref 12–78)
ANION GAP SERPL CALCULATED.3IONS-SCNC: 0 MMOL/L (ref 4–13)
AST SERPL W P-5'-P-CCNC: 19 U/L (ref 5–45)
BASOPHILS # BLD AUTO: 0.05 THOUSANDS/ÂΜL (ref 0–0.1)
BASOPHILS NFR BLD AUTO: 1 % (ref 0–1)
BILIRUB SERPL-MCNC: 0.49 MG/DL (ref 0.2–1)
BUN SERPL-MCNC: 8 MG/DL (ref 5–25)
CALCIUM SERPL-MCNC: 8.9 MG/DL (ref 8.3–10.1)
CHLORIDE SERPL-SCNC: 111 MMOL/L (ref 96–108)
CHOLEST SERPL-MCNC: 160 MG/DL
CO2 SERPL-SCNC: 28 MMOL/L (ref 21–32)
CREAT SERPL-MCNC: 0.95 MG/DL (ref 0.6–1.3)
EOSINOPHIL # BLD AUTO: 0.22 THOUSAND/ÂΜL (ref 0–0.61)
EOSINOPHIL NFR BLD AUTO: 3 % (ref 0–6)
ERYTHROCYTE [DISTWIDTH] IN BLOOD BY AUTOMATED COUNT: 14.9 % (ref 11.6–15.1)
FERRITIN SERPL-MCNC: 8 NG/ML (ref 8–388)
GFR SERPL CREATININE-BSD FRML MDRD: 71 ML/MIN/1.73SQ M
GLUCOSE P FAST SERPL-MCNC: 95 MG/DL (ref 65–99)
HCT VFR BLD AUTO: 41.9 % (ref 34.8–46.1)
HDLC SERPL-MCNC: 41 MG/DL
HGB BLD-MCNC: 12.6 G/DL (ref 11.5–15.4)
IMM GRANULOCYTES # BLD AUTO: 0.03 THOUSAND/UL (ref 0–0.2)
IMM GRANULOCYTES NFR BLD AUTO: 0 % (ref 0–2)
IRON SATN MFR SERPL: 8 % (ref 15–50)
IRON SERPL-MCNC: 32 UG/DL (ref 50–170)
LDLC SERPL CALC-MCNC: 101 MG/DL (ref 0–100)
LYMPHOCYTES # BLD AUTO: 2.96 THOUSANDS/ÂΜL (ref 0.6–4.47)
LYMPHOCYTES NFR BLD AUTO: 33 % (ref 14–44)
MCH RBC QN AUTO: 25.4 PG (ref 26.8–34.3)
MCHC RBC AUTO-ENTMCNC: 30.1 G/DL (ref 31.4–37.4)
MCV RBC AUTO: 84 FL (ref 82–98)
MONOCYTES # BLD AUTO: 0.75 THOUSAND/ÂΜL (ref 0.17–1.22)
MONOCYTES NFR BLD AUTO: 8 % (ref 4–12)
NEUTROPHILS # BLD AUTO: 4.89 THOUSANDS/ÂΜL (ref 1.85–7.62)
NEUTS SEG NFR BLD AUTO: 55 % (ref 43–75)
NONHDLC SERPL-MCNC: 119 MG/DL
NRBC BLD AUTO-RTO: 0 /100 WBCS
PLATELET # BLD AUTO: 391 THOUSANDS/UL (ref 149–390)
PMV BLD AUTO: 12.5 FL (ref 8.9–12.7)
POTASSIUM SERPL-SCNC: 4.3 MMOL/L (ref 3.5–5.3)
PROT SERPL-MCNC: 8.2 G/DL (ref 6.4–8.4)
RBC # BLD AUTO: 4.97 MILLION/UL (ref 3.81–5.12)
SODIUM SERPL-SCNC: 139 MMOL/L (ref 135–147)
T4 FREE SERPL-MCNC: 0.66 NG/DL (ref 0.76–1.46)
TIBC SERPL-MCNC: 377 UG/DL (ref 250–450)
TRIGL SERPL-MCNC: 91 MG/DL
TSH SERPL DL<=0.05 MIU/L-ACNC: 5.15 UIU/ML (ref 0.45–4.5)
WBC # BLD AUTO: 8.9 THOUSAND/UL (ref 4.31–10.16)

## 2023-05-04 ENCOUNTER — APPOINTMENT (OUTPATIENT)
Dept: PHYSICAL THERAPY | Facility: CLINIC | Age: 48
End: 2023-05-04
Payer: OTHER MISCELLANEOUS

## 2023-05-05 ENCOUNTER — TELEPHONE (OUTPATIENT)
Dept: FAMILY MEDICINE CLINIC | Facility: CLINIC | Age: 48
End: 2023-05-05

## 2023-05-05 DIAGNOSIS — E03.9 HYPOTHYROIDISM, UNSPECIFIED TYPE: Primary | ICD-10-CM

## 2023-05-05 RX ORDER — LEVOTHYROXINE SODIUM 0.07 MG/1
75 TABLET ORAL
Qty: 30 TABLET | Refills: 5 | Status: SHIPPED | OUTPATIENT
Start: 2023-05-05

## 2023-05-05 NOTE — TELEPHONE ENCOUNTER
----- Message from Conchita 2 sent at 5/5/2023  8:19 AM EDT -----  Patient's iron level is low  Needs to take at least 65 mg of elemental iron 3 times a day  May take this up over-the-counter  Increase intake of iron rich foods  Patient's thyroid level is also slightly elevated will increase levothyroxine to 75 mcg  Prescription sent  Patient also needs to take 2000 international units of vitamin D daily  All other blood work was good

## 2023-05-09 ENCOUNTER — OFFICE VISIT (OUTPATIENT)
Dept: PHYSICAL THERAPY | Facility: CLINIC | Age: 48
End: 2023-05-09

## 2023-05-09 DIAGNOSIS — S82.891D CLOSED FRACTURE OF RIGHT ANKLE WITH ROUTINE HEALING, SUBSEQUENT ENCOUNTER: Primary | ICD-10-CM

## 2023-05-09 NOTE — PROGRESS NOTES
"Daily Note     Today's date: 2023  Patient name: Sondra Serrano  : 1975  MRN: [id number]  Referring provider: Héctor Dyer MD  Dx:   Encounter Diagnosis     ICD-10-CM    1  Closed fracture of right ankle with routine healing, subsequent encounter  [id number]           Start Time: 08  Stop Time: 1102  Total time in clinic (min): 47 minutes    Subjective: Pt reports she returned to work last week and experienced a sharp pain in her R ankle  Objective: See treatment diary below      Assessment: Tolerated treatment well  Patient demonstrated fatigue post treatment, exhibited good technique with therapeutic exercises and would benefit from continued PT  Pt was able to progress today with inclusion of standing heel raises and mini squats into pt's exercise program  Pt had increased difficulty with biodex weightshifting at level 4  Pt required min verbal cues to facilitate performance of proper technique  Assess pt response to treatment at next visit  Plan: Continue per plan of care  Precautions: HTN, ORIF on      Manuals 3/27 4/3 4/10 4/17 4/20  4/24  4/27  5/1  5/9     PROM R ankle 10' 10' 10' 10'  10'  6'  10'  NV  5'     Grade I/II post mobs                       Gr   I joint distraction            2'                                   Neuro Re-Ed                       Pt education     Scar massage 4'  4'  4'    scar massage 4'    4'     BAPS board ant/post, inv/ev                       Tandem walking                 2x at bar     Towel scrunches 30x 30x 30x 30x  30x  30x  HEP         Great toe Ext 20x active 20x active  20x  20x  20x  20x  HEP         Toe Yoga   20x 20x 20x  20x 20x  HEP                                 Ther Ex                       Standing heel raises         2x10     Ankle circles Alphabet 1x  alphabet 1x alphabet 1x alphabet 1x  alphabet 1x  alphabet 1x  alphabet 1x  alphabet 1x  alphabet 2x     Long sitting calf stretch 6x15\" w/ bolster under knee " "5x20\"  bolster under knee  5x 20\" bolster under knee 5x 20\" bolster under knee  20\"x6 bolster under knee   20\"x6 bolster under knee  +gastroc 20\"x3  [id number]\"x6 bolster under knee  gastroc   4x30\"  5x 20\"     Ankle 4-way  TBand Peach DF/PF/INV/EV OTB 20x ea OTB 20x ea OTB 20x ea  OTB 20x ea  OTB 20x ea  GTB 20x ea  GTB 20x ea        Seated HR/TR 20x 10#  HR 20x HR 20x ea 20x ea  20x ea  20x ea  NV  20x ea  6# 30x 6#     ProStretch 20\"x6 seated  20\"x6 seated  20\"x6  Seated 20\"x6  Seated  20\"x6 seated  20\"x6 seated  standing 20\"x6  standing 20\"x6  5x 20\"     Mini static lunge on foam     NV?                 Nustep  5' time time 11'  5'  5'  5'  NV       Ther Activity                       Biodex weight shifting No holds 100% on R 5\"x20  5-10\" holds 100% on R  20x 10\"x20  100% on R  10\"x20  100% on R, fwd/bwd  10\"x20 100% on R, fwd/bwd  10\"x20 100% on R, fwd/bwd  10\"x20 100% on R  10\"x10 lat 100%  5\"x10 fwd/bwd  on R  2' lateral 2' fwd/bwd lvl 4     Mini squats         2x10                 Gait Training                       Ambulation in lace up brace At mirror w/ handrail 1 lap  w/ cues                                             Modalities                       CP                                                                "

## 2023-05-11 ENCOUNTER — OFFICE VISIT (OUTPATIENT)
Dept: PHYSICAL THERAPY | Facility: CLINIC | Age: 48
End: 2023-05-11

## 2023-05-11 DIAGNOSIS — S82.891D CLOSED FRACTURE OF RIGHT ANKLE WITH ROUTINE HEALING, SUBSEQUENT ENCOUNTER: Primary | ICD-10-CM

## 2023-05-11 DIAGNOSIS — Z48.89 AFTERCARE FOLLOWING SURGERY: Primary | ICD-10-CM

## 2023-05-11 NOTE — PROGRESS NOTES
Daily Note     Today's date: 2023  Patient name: Edwardo Shahid  : 1975  MRN: [id number]  Referring provider: Kristal Condon MD  Dx:   Encounter Diagnosis     ICD-10-CM    1  Closed fracture of right ankle with routine healing, subsequent encounter  S82 891D           Start Time:   Stop Time: 7025  Total time in clinic (min): 40 minutes    Subjective: Pt reports pain is not too bad today  Pt returned to work last night, took sitting breaks as planned and did okay  Objective: See treatment diary below      Assessment: Tolerated treatment well  Patient demonstrated fatigue post treatment, exhibited good technique with therapeutic exercises and would benefit from continued PT  Re-initiated gentle joint distraction and mobs in order to promote decreased stiffness and improved ROM  Performed mobs gently due to pt demonstrating some tenderness to palpation  Pt demonstrated significant muscle fatigue at end of session, unable to initiate progressions with stepping exercises  Plan: Continue per plan of care  Progress treatment as tolerated  Precautions: HTN, ORIF on      Manuals 3/27 4/3 4/10 4/17 4/20  4/24  4/27  5/1  5/9  5/11   PROM R ankle 10' 10' 10' 10'  10'  6'  10'  NV  5'     Grade I/II post mobs                    4' subtalar   Gr   I joint distraction            2'        2'                           Neuro Re-Ed                       Pt education     Scar massage 4'  4'  4'    scar massage 4'    4'     BAPS board ant/post, inv/ev                       Tandem walking                 2x at bar  2x at bar   Towel scrunches 30x 30x 30x 30x  30x  30x  HEP         Great toe Ext 20x active 20x active  20x  20x  20x  20x  HEP         Toe Yoga   20x 20x 20x  20x 20x  HEP                                 Ther Ex                       Standing heel raises         2x10  2x10   Ankle circles Alphabet 1x  alphabet 1x alphabet 1x alphabet 1x  alphabet 1x  alphabet 1x  alphabet "1x  alphabet 1x  alphabet 2x  alphabet 2x   Long sitting calf stretch 6x15\" w/ bolster under knee 5x20\"  bolster under knee  5x 20\" bolster under knee 5x 20\" bolster under knee  20\"x6 bolster under knee   20\"x6 bolster under knee  +gastroc 20\"x3  [id number]\"x6 bolster under knee  gastroc   4x30\"  5x 20\"  5x 20\"   Ankle 4-way  TBand Peach DF/PF/INV/EV OTB 20x ea OTB 20x ea OTB 20x ea  OTB 20x ea  OTB 20x ea  GTB 20x ea  GTB 20x ea        Seated HR/TR 20x 10#  HR 20x HR 20x ea 20x ea  20x ea  20x ea  NV  20x ea  6# 30x 6#  30x 6#   ProStretch 20\"x6 seated  20\"x6 seated  20\"x6  Seated 20\"x6  Seated  20\"x6 seated  20\"x6 seated  standing 20\"x6  standing 20\"x6  5x 20\"  5x 20\"   Mini static lunge on foam     NV?                 Nustep  5' time time 11'  5'  5'  5'  NV       Ther Activity                       Biodex weight shifting No holds 100% on R 5\"x20  5-10\" holds 100% on R  20x 10\"x20  100% on R  10\"x20  100% on R, fwd/bwd  10\"x20 100% on R, fwd/bwd  10\"x20 100% on R, fwd/bwd  10\"x20 100% on R  10\"x10 lat 100%  5\"x10 fwd/bwd  on R  2' lateral 2' fwd/bwd lvl 4  2' lateral 2' fwd/bwd lvl 4   Mini squats         2x10 2x10   Fwd/Lat   step downs             Gait Training                       Ambulation in lace up brace At mirror w/ handrail 1 lap  w/ cues                                             Modalities                       CP                                                                  "

## 2023-05-12 ENCOUNTER — TELEPHONE (OUTPATIENT)
Dept: OBGYN CLINIC | Facility: CLINIC | Age: 48
End: 2023-05-12

## 2023-05-12 ENCOUNTER — DOCUMENTATION (OUTPATIENT)
Dept: BEHAVIORAL/MENTAL HEALTH CLINIC | Facility: CLINIC | Age: 48
End: 2023-05-12

## 2023-05-12 NOTE — TELEPHONE ENCOUNTER
I called Malorie Davalos to verify the patient's WC claim is still active and open  I left a message for the , Eduardo Rodríguez, to call me back at [phone]

## 2023-05-15 ENCOUNTER — OFFICE VISIT (OUTPATIENT)
Dept: PHYSICAL THERAPY | Facility: CLINIC | Age: 48
End: 2023-05-15

## 2023-05-15 DIAGNOSIS — S82.891D CLOSED FRACTURE OF RIGHT ANKLE WITH ROUTINE HEALING, SUBSEQUENT ENCOUNTER: Primary | ICD-10-CM

## 2023-05-15 NOTE — PROGRESS NOTES
"Daily Note     Today's date: 5/15/2023  Patient name: Edwardo Shahid  : 1975  MRN: [id number]  Referring provider: Kristal Condon MD  Dx:   Encounter Diagnosis     ICD-10-CM    1  Closed fracture of right ankle with routine healing, subsequent encounter  B53 661V                  Subjective: Able to accommodate for condensed session  Patient notes increased pain today, 8/10 through achilles  Patient notes increased standing/walking on Saturday  Objective: See treatment diary below    Assessment: Tolerated treatment fair  Increased pain with standing activity trialed at end of session  Limited tolerance to manual therapy, increased pain with light pressure  TTP through posterior ankle  Patient demonstrated fatigue post treatment, exhibited good technique with therapeutic exercises and would benefit from continued PT  Continue as able nv per patient tolerance  Plan: Continue per plan of care  Progress treatment as tolerated  Precautions: HTN, ORIF on      Manuals 5/15  4/10 4/17 4/20  4/24  4/27  5/1  5/9  5/11   PROM R ankle LH  10' 10'  10'  6'  10'  NV  5'     Grade I/II post mobs LH                 4' subtalar   Gr   I joint distraction LH         2'        2'                         Neuro Re-Ed 5/15                    Pt education   Scar massage 4'  4'  4'    scar massage 4'    4'     BAPS board ant/post, inv/ev                     Tandem walking               2x at bar  2x at bar   Towel scrunches   30x 30x  30x  30x  HEP         Great toe Ext   20x  20x  20x  20x  HEP         Toe Yoga   20x 20x  20x 20x  HEP                               Ther Ex 5/15                    Standing heel raises 2x10        2x10  2x10   Alphabet 2x  alphabet 1x alphabet 1x  alphabet 1x  alphabet 1x  alphabet 1x  alphabet 1x  alphabet 2x  alphabet 2x   Long sitting calf stretch 5x20\"  5x 20\" bolster under knee 5x 20\" bolster under knee  20\"x6 bolster under knee   20\"x6 bolster under knee " " +gastroc 20\"x3  blgyew39\"x6 bolster under knee  gastroc   4x30\"  5x 20\"  5x 20\"   Ankle 4-way  TBand GTB 20x ea  OTB 20x ea OTB 20x ea  OTB 20x ea  OTB 20x ea  GTB 20x ea  GTB 20x ea        Seated HR/TR   20x ea 20x ea  20x ea  20x ea  NV  20x ea  6# 30x 6#  30x 6#   ProStretch   20\"x6  Seated 20\"x6  Seated  20\"x6 seated  20\"x6 seated  standing 20\"x6  standing 20\"x6  5x 20\"  5x 20\"   Mini static lunge on foam   NV?                 Nustep  5' w/ UEs  time 11'  5'  5'  5'  NV       Ther Activity 5/15                    Biodex weight shifting   10\"x20  100% on R  10\"x20  100% on R, fwd/bwd  10\"x20 100% on R, fwd/bwd  10\"x20 100% on R, fwd/bwd  10\"x20 100% on R  10\"x10 lat 100%  5\"x10 fwd/bwd  on R  2' lateral 2' fwd/bwd lvl 4  2' lateral 2' fwd/bwd lvl 4   Mini squats 2x10        2x10 2x10   Fwd/Lat   step downs             Gait Training                     Ambulation in lace up brace                                           Modalities                     CP                                                              "

## 2023-05-16 ENCOUNTER — APPOINTMENT (OUTPATIENT)
Dept: RADIOLOGY | Facility: CLINIC | Age: 48
End: 2023-05-16

## 2023-05-16 ENCOUNTER — OFFICE VISIT (OUTPATIENT)
Dept: OBGYN CLINIC | Facility: CLINIC | Age: 48
End: 2023-05-16

## 2023-05-16 VITALS
WEIGHT: 233.6 LBS | HEART RATE: 78 BPM | HEIGHT: 64 IN | SYSTOLIC BLOOD PRESSURE: 149 MMHG | BODY MASS INDEX: 39.88 KG/M2 | DIASTOLIC BLOOD PRESSURE: 104 MMHG

## 2023-05-16 DIAGNOSIS — Z48.89 AFTERCARE FOLLOWING SURGERY: ICD-10-CM

## 2023-05-16 DIAGNOSIS — Z48.89 AFTERCARE FOLLOWING SURGERY: Primary | ICD-10-CM

## 2023-05-16 DIAGNOSIS — M76.61 ACHILLES TENDINITIS OF RIGHT LOWER EXTREMITY: ICD-10-CM

## 2023-05-16 NOTE — PROGRESS NOTES
"Orthopaedics Office Visit - Post op Patient Visit    ASSESSMENT/PLAN:    Assessment:   - Aprox  5 months ORIF right ankle bimalleolar equivalent fracture   - DOS 2022  - Pain, hypersensitivity and limited ROM, CRPS  - Developed achilles tendinitis     Plan:   · X-rays were performed in the office and reviewed   · Continue PT adding in achilles exercises   · Advised to purchase heel cups for achilles tendinitis off of [employer]   · Continue to treat with Dr Soraida Moreno per his recommendations   · Continue current work restrictions, 5 hrs shifts with a 10 minute sitting break every 2 hours, note was provided   · Follow up in 8 weeks time for re-evaluation     To Do Next Visit:  Re-evaluation     _____________________________________________________  CHIEF COMPLAINT:  Chief Complaint   Patient presents with   • Right Ankle - Follow-up         SUBJECTIVE:  Shari Craft is a 49 y o  female who presents to the office aprox  5 months s/p ORIF right ankle bimalleolar equivalent fracture, DOS 22  Kaleigh Lunsford continues to attend PT 2x a week  She notes some improvement in her posterior and lateral ankle pain  She notes pain is intermittent in nature and is described as sharp  She is treating with Dr Soraida Moreno due to chronic pain and CRPS  She is on Gabapentin, using a Ketamine cream as well as Voltaren Gel  Dr Soraida Moreno did recommend that she talks to her PCP about switching from Zoloft to Cymbalta as Cymbalta can help with chronic muscle skeletal pain  She works in sales, she is currently working 5 hours shifts with sitting breaks every 2 hours       PAST MEDICAL HISTORY:  Past Medical History:   Diagnosis Date   • Allergic    • GERD (gastroesophageal reflux disease)    • Hypertension    • Sleep apnea     does not use cpap \"yet\"   • Sleep apnea, obstructive    • Varicella        PAST SURGICAL HISTORY:  Past Surgical History:   Procedure Laterality Date   •  SECTION      x2    • MI OPEN TX DISTAL FIBULAR FRACTURE " LAT MALLEOLUS Right 12/29/2022    Procedure: OPEN REDUCTION W/ INTERNAL FIXATION (ORIF) RIGHT ANKLE BIMALL EQUIV FRACTURE;  Surgeon: Farnaz Vernon MD;  Location: [facility];  Service: Orthopedics       FAMILY HISTORY:  Family History   Problem Relation Age of Onset   • Esophageal cancer Mother    • Heart disease Father    • No Known Problems Sister    • No Known Problems Daughter    • No Known Problems Maternal Grandmother    • No Known Problems Maternal Grandfather    • No Known Problems Paternal Grandmother    • No Known Problems Paternal Grandfather    • No Known Problems Sister    • No Known Problems Brother    • No Known Problems Brother    • No Known Problems Son    • No Known Problems Maternal Aunt    • No Known Problems Paternal Aunt    • No Known Problems Paternal Aunt    • No Known Problems Paternal Aunt    • No Known Problems Paternal Aunt    • No Known Problems Paternal Aunt        SOCIAL HISTORY:  Social History     Tobacco Use   • Smoking status: Never   • Smokeless tobacco: Never   Vaping Use   • Vaping Use: Never used   Substance Use Topics   • Alcohol use: Not Currently   • Drug use: Never       MEDICATIONS:    Current Outpatient Medications:   •  Diclofenac Sodium (VOLTAREN) 1 %, Apply 2 g topically 4 (four) times a day, Disp: 100 g, Rfl: 0  •  gabapentin (Neurontin) 300 mg capsule, Take 1 capsule (300 mg total) by mouth 3 (three) times a day, Disp: 90 capsule, Rfl: 1  •  Iron-Vitamin C (Vitron-C)  MG TABS, Take 1 tablet by mouth 2 (two) times a day, Disp: 60 tablet, Rfl: 3  •  levothyroxine (Levo-T) 75 mcg tablet, Take 1 tablet (75 mcg total) by mouth daily in the early morning, Disp: 30 tablet, Rfl: 5  •  meloxicam (Mobic) 15 mg tablet, Take 1 tablet (15 mg total) by mouth daily With food, Disp: 30 tablet, Rfl: 1  •  multivitamin (THERAGRAN) TABS, Take 1 tablet by mouth daily, Disp: , Rfl:   •  sertraline (ZOLOFT) 50 mg tablet, Take 1 tablet (50 mg total) by mouth daily, Disp: 90 "tablet, Rfl: 5  •  VITAMIN D PO, Take by mouth, Disp: , Rfl:   •  Wegovy 0 25 MG/0 5ML, , Disp: , Rfl:   •  aspirin (ECOTRIN LOW STRENGTH) 81 mg EC tablet, Take 1 tablet (81 mg total) by mouth every 12 (twelve) hours, Disp: 84 tablet, Rfl: 0    ALLERGIES:  No Known Allergies    REVIEW OF SYSTEMS:  MSK: as noted in HPI  Neuro: WNL  Pertinent items are otherwise noted in HPI  A comprehensive review of systems was otherwise negative  LABS:  HgA1c: No results found for: HGBA1C  BMP:   Lab Results   Component Value Date    CALCIUM 8 9 05/03/2023    K 4 3 05/03/2023    CO2 28 05/03/2023     (H) 05/03/2023    BUN 8 05/03/2023    CREATININE 0 95 05/03/2023     CBC: No components found for: CBC    _____________________________________________________  PHYSICAL EXAMINATION:  Vital signs: BP (!) 149/104   Pulse 78   Ht 5' 4\" (1 626 m)   Wt 106 kg (233 lb 9 6 oz)   BMI 40 10 kg/m²   General: No acute distress, awake and alert  Psychiatric: Mood and affect appear appropriate  HEENT: Trachea Midline, No torticollis, no apparent facial trauma  Cardiovascular: No audible murmurs; Extremities appear perfused  Pulmonary: No audible wheezing or stridor  Skin: No open lesions; see further details (if any) below    MUSCULOSKELETAL EXAMINATION:    Extremities:  Right ankle     No erythema, ecchymosis or significant edema   Achilles tendon is tender to palpation   No skin changes noted   No tempeture changes noted   Well healed surgical incision   Ankle ROM WNL's   Ambulates without assistance   Extremity appears warm and well perfused     _____________________________________________________  STUDIES REVIEWED:  I personally reviewed the images and interpretation is as follows:  X-rays of the right ankle demonstrate s/p ORIF  Fracture is healed  No evidence of hardware failure or loosening        PROCEDURES PERFORMED:  Procedures      Scribe Attestation    I,:  Aurelio Saleem am acting as a scribe while in the presence of " the attending physician :       I,:  Mack Oleary MD personally performed the services described in this documentation    as scribed in my presence :

## 2023-05-16 NOTE — LETTER
May 16, 2023     Patient: Amalia Ambriz  YOB: 1975  Date of Visit: 5/16/2023      To Whom it May Concern:    Amalia Ambriz is under my professional care  Baldemar Vallecillo was seen in my office on 5/16/2023  Baldemar Vallecillo will continue her current work restrictions  She will be re-evaluated in 8 weeks time  If you have any questions or concerns, please don't hesitate to call           Sincerely,          Chay Hardin MD

## 2023-05-17 ENCOUNTER — TELEPHONE (OUTPATIENT)
Dept: OBGYN CLINIC | Facility: HOSPITAL | Age: 48
End: 2023-05-17

## 2023-05-17 NOTE — TELEPHONE ENCOUNTER
Caller: Sedrick Leslie    Doctor/Office: Mini    CB#:       What needs to be faxed: 5/16/23 ov note and work note    ATTN to: Mel Ronquillo    Fax#: [phone]      Documents were successfully e-faxed

## 2023-05-18 ENCOUNTER — APPOINTMENT (OUTPATIENT)
Dept: PHYSICAL THERAPY | Facility: CLINIC | Age: 48
End: 2023-05-18
Payer: OTHER MISCELLANEOUS

## 2023-05-23 ENCOUNTER — OFFICE VISIT (OUTPATIENT)
Dept: PHYSICAL THERAPY | Facility: CLINIC | Age: 48
End: 2023-05-23

## 2023-05-23 DIAGNOSIS — S82.891D CLOSED FRACTURE OF RIGHT ANKLE WITH ROUTINE HEALING, SUBSEQUENT ENCOUNTER: Primary | ICD-10-CM

## 2023-05-23 NOTE — PROGRESS NOTES
"Daily Note     Today's date: 2023  Patient name: Jeanie Abbott  : 1975  MRN: [id number]  Referring provider: Erica Nicholas MD  Dx:   Encounter Diagnosis     ICD-10-CM    1  Closed fracture of right ankle with routine healing, subsequent encounter  S82 891D           Start Time: 1630  Stop Time: 8842  Total time in clinic (min): 45 minutes    Subjective: Pt reports she continues to have decreased pain in her R ankle as well as improved tolerance at work  Objective: See treatment diary below      Assessment: Tolerated treatment well  Patient demonstrated fatigue post treatment, exhibited good technique with therapeutic exercises and would benefit from continued PT  Pt was able to progress today with inclusion of step ups and y-balance into pt's exercise program  Pt required min verbal cues to facilitate performance of proper technique  Assess pt response to treatment at next visit  Plan: Continue per plan of care  Precautions: HTN, ORIF on      Manuals 5/15 5/23       5/1  5/9  5/11   PROM R ankle LH JOANN      NV  5'     Grade I/II post mobs LH            4' subtalar   Gr   I joint distraction LH            2'    subtalar mobilizations  JOANN              Neuro Re-Ed 5/15               Pt education  4'         4'     BAPS board ant/post, inv/ev                Tandem walking  2x at bar        2x at bar  2x at bar   y-balance  2x5              Great toe Ext                Toe Yoga                                 Ther Ex 5/15               Standing heel raises 2x10 2x10       2x10  2x10   Alphabet 2x 2x       alphabet 1x  alphabet 2x  alphabet 2x   Long sitting calf stretch 5x20\"        gastroc   4x30\"  5x 20\"  5x 20\"   Ankle 4-way  TBand GTB 20x ea        GTB 20x ea        Seated HR/TR         20x ea  6# 30x 6#  30x 6#   ProStretch  5x 20\"       standing 20\"x6  5x 20\"  5x 20\"   Mini static lunge on foam                Nustep for LE ROM 5' w/ UEs 5'       NV       Ther Activity " "5/15               Biodex weight shifting  2' lateral 2' fwd/bwd lvl 4       10\"x10 lat 100%  5\"x10 fwd/bwd  on R  2' lateral 2' fwd/bwd lvl 4  2' lateral 2' fwd/bwd lvl 4   Mini squats 2x10 2x10       2x10 2x10   Step ups  2x10 6\"           Gait Training                Ambulation in lace up brace                                 Modalities                CP                                                      "

## 2023-05-25 ENCOUNTER — APPOINTMENT (OUTPATIENT)
Dept: PHYSICAL THERAPY | Facility: CLINIC | Age: 48
End: 2023-05-25
Payer: OTHER MISCELLANEOUS

## 2023-05-25 NOTE — PROGRESS NOTES
"Daily Note     Today's date: 2023  Patient name: Bev Garcia  : 1975  MRN: [id number]  Referring provider: Sudeep Daniels MD  Dx: No diagnosis found  Subjective: ***      Objective: See treatment diary below      Assessment: Tolerated treatment {Tolerated treatment :}  Patient {assessment:1751724825}      Plan: {PLAN:}     Precautions: HTN, ORIF on      Manuals 5/15 5/23       5/1  5/9  5/11   PROM R ankle LH JOANN      NV  5'     Grade I/II post mobs LH            4' subtalar   Gr   I joint distraction LH            2'    subtalar mobilizations  JOANN              Neuro Re-Ed 5/15               Pt education  4'         4'     BAPS board ant/post, inv/ev                Tandem walking  2x at bar        2x at bar  2x at bar   y-balance  2x5              Great toe Ext                Toe Yoga                                 Ther Ex 5/15               Standing heel raises 2x10 2x10       2x10  2x10   Alphabet 2x 2x       alphabet 1x  alphabet 2x  alphabet 2x   Long sitting calf stretch 5x20\"        gastroc   4x30\"  5x 20\"  5x 20\"   Ankle 4-way  TBand GTB 20x ea        GTB 20x ea        Seated HR/TR         20x ea  6# 30x 6#  30x 6#   ProStretch  5x 20\"       standing 20\"x6  5x 20\"  5x 20\"   Mini static lunge on foam                Nustep for LE ROM 5' w/ UEs 5'       NV       Ther Activity 5/15               Biodex weight shifting  2' lateral 2' fwd/bwd lvl 4       10\"x10 lat 100%  5\"x10 fwd/bwd  on R  2' lateral 2' fwd/bwd lvl 4  2' lateral 2' fwd/bwd lvl 4   Mini squats 2x10 2x10       2x10 2x10   Step ups  2x10 6\"           Gait Training                Ambulation in lace up brace                                 Modalities                CP                                                        "

## 2023-05-30 ENCOUNTER — APPOINTMENT (OUTPATIENT)
Dept: PHYSICAL THERAPY | Facility: CLINIC | Age: 48
End: 2023-05-30
Payer: OTHER MISCELLANEOUS

## 2023-05-30 DIAGNOSIS — S82.891D CLOSED FRACTURE OF RIGHT ANKLE WITH ROUTINE HEALING, SUBSEQUENT ENCOUNTER: Primary | ICD-10-CM

## 2023-05-30 NOTE — PROGRESS NOTES
"Daily Note     Today's date: 2023  Patient name: Onelia Pope  : 1975  MRN: [id number]  Referring provider: Estela Ferrell MD  Dx:   Encounter Diagnosis     ICD-10-CM    1  Closed fracture of right ankle with routine healing, subsequent encounter  H65 740R                      Subjective: ***      Objective: See treatment diary below      Assessment: Tolerated treatment {Tolerated treatment :[id number]}  Patient {assessment:[id number]}      Plan: {PLAN:6458066805}     Precautions: HTN, ORIF on      Manuals 5/15 5/23 5/30      5/1  5/9  5/11   PROM R ankle LH JOANN KT     NV  5'     Grade I/II post mobs LH            4' subtalar   Gr   I joint distraction LH            2'    subtalar mobilizations  JOANN              Neuro Re-Ed 5/15               Pt education  4'         4'     BAPS board ant/post, inv/ev                Tandem walking  2x at bar 2x at bar       2x at bar  2x at bar   y-balance  2x5 2x5             Great toe Ext                Toe Yoga                                 Ther Ex 5/15               Standing heel raises 2x10 2x10 2x10      2x10  2x10   Alphabet 2x 2x 2x      alphabet 1x  alphabet 2x  alphabet 2x   Long sitting calf stretch 5x20\"        gastroc   4x30\"  5x 20\"  5x 20\"   Ankle 4-way  TBand GTB 20x ea        GTB 20x ea        Seated HR/TR         20x ea  6# 30x 6#  30x 6#   ProStretch  5x 20\" 5x 20\"      standing 20\"x6  5x 20\"  5x 20\"   Mini static lunge on foam                Nustep for LE ROM 5' w/ UEs 5' 5'       NV       Ther Activity 5/15               Biodex weight shifting  2' lateral 2' fwd/bwd lvl 4 2' lateral 2' fwd/bwd lvl 4      10\"x10 lat 100%  5\"x10 fwd/bwd  on R  2' lateral 2' fwd/bwd lvl 4  2' lateral 2' fwd/bwd lvl 4   Mini squats 2x10 2x10 2x10      2x10 2x10   Step ups  2x10 6\" 2x10 6\"          Gait Training                Ambulation in lace up brace                                 Modalities                CP                               "

## 2023-06-01 ENCOUNTER — OFFICE VISIT (OUTPATIENT)
Dept: PHYSICAL THERAPY | Facility: CLINIC | Age: 48
End: 2023-06-01

## 2023-06-01 DIAGNOSIS — S82.891D CLOSED FRACTURE OF RIGHT ANKLE WITH ROUTINE HEALING, SUBSEQUENT ENCOUNTER: Primary | ICD-10-CM

## 2023-06-01 NOTE — PROGRESS NOTES
"Daily Note     Today's date: 2023  Patient name: Jason Landis  : 1975  MRN: [id number]  Referring provider: Jonny Burch MD  Dx:   Encounter Diagnosis     ICD-10-CM    1  Closed fracture of right ankle with routine healing, subsequent encounter  S82 891D           Start Time: 1630  Stop Time: 2912  Total time in clinic (min): 45 minutes    Subjective: Patient reports that she feels good today and pain in the ankle is around 3/10  Objective: See treatment diary below      Assessment: Tolerated treatment well  Patient participated in skilled PT session focused on strengthening, stretching, and ROM  Patient demonstrates improved R ankle ROM with decreased pain  Patient able to complete exercise program with no increase in sx  Patient required B/L UE for step ups this session  Patient would continue to benefit from skilled PT interventions to address strengthening, stretching, and ROM  Patient demonstrated fatigue post treatment      Plan: Continue per plan of care  Precautions: HTN, ORIF on      Manuals 5/15 5/23 6/1      5/1  5/9  5/11   PROM R ankle LH JOANN CD     NV  5'     Grade I/II post mobs LH            4' subtalar   Gr   I joint distraction LH            2'    subtalar mobilizations  JOANN              Neuro Re-Ed 5/15               Pt education  4'         4'     BAPS board ant/post, inv/ev                Tandem walking  2x at bar 2x at bar       2x at bar  2x at bar   y-balance  2x5 2x5             Great toe Ext                Toe Yoga                                 Ther Ex 5/15               Standing heel raises 2x10 2x10 2x10      2x10  2x10   Alphabet 2x 2x 2x      alphabet 1x  alphabet 2x  alphabet 2x   Long sitting calf stretch 5x20\"  20\" 5x      gastroc   4x30\"  5x 20\"  5x 20\"   Ankle 4-way  TBand GTB 20x ea        GTB 20x ea        Seated HR/TR         20x ea  6# 30x 6#  30x 6#   ProStretch  5x 20\" 20\" 5x       standing 20\"x6  5x 20\"  5x 20\"   Mini static " "lunge on foam                Nustep for LE ROM 5' w/ UEs 5' Recembent bike L1 5 min      NV       Ther Activity 5/15               Biodex weight shifting  2' lateral 2' fwd/bwd lvl 4 2' lateral  2' Fwd/Bwd  lvl 4      10\"x10 lat 100%  5\"x10 fwd/bwd  on R  2' lateral 2' fwd/bwd lvl 4  2' lateral 2' fwd/bwd lvl 4   Mini squats 2x10 2x10 2x10      2x10 2x10   Step ups  2x10 6\" 6\" step 2x10          Gait Training                Ambulation in lace up brace                                 Modalities                CP                                                        "

## 2023-06-05 ENCOUNTER — OFFICE VISIT (OUTPATIENT)
Dept: PHYSICAL THERAPY | Facility: CLINIC | Age: 48
End: 2023-06-05
Payer: OTHER MISCELLANEOUS

## 2023-06-05 DIAGNOSIS — S82.891D CLOSED FRACTURE OF RIGHT ANKLE WITH ROUTINE HEALING, SUBSEQUENT ENCOUNTER: Primary | ICD-10-CM

## 2023-06-05 PROCEDURE — 97110 THERAPEUTIC EXERCISES: CPT

## 2023-06-05 PROCEDURE — 97530 THERAPEUTIC ACTIVITIES: CPT

## 2023-06-05 PROCEDURE — 97112 NEUROMUSCULAR REEDUCATION: CPT

## 2023-06-05 NOTE — PROGRESS NOTES
"Daily Note     Today's date: 2023  Patient name: Rafy Christopher  : 1975  MRN: [id number]  Referring provider: Luba Fernandes MD  Dx:   Encounter Diagnosis     ICD-10-CM    1  Closed fracture of right ankle with routine healing, subsequent encounter  C47 [id number]                      Subjective: Pt reports stiffness in her ankle  Objective: See treatment diary below      Assessment: Tolerated treatment well  Cues needed for Y balance for proper technique  Stiffness and restriction continues with ankle eversion and DF  Good effort noted with all exercises  Performed gastroc stretching in standing today  Patient demonstrated fatigue post treatment, exhibited good technique with therapeutic exercises and would benefit from continued PT      Plan: Continue per plan of care  Precautions: HTN, ORIF on      Manuals 5/15 5/23 6/1 6/5     5/1  5/9  5/11   PROM R ankle LH JOANN CD KT    NV  5'     Grade I/II post mobs LH            4' subtalar   Gr   I joint distraction LH            2'    subtalar mobilizations  JOANN              Neuro Re-Ed 5/15               Pt education  4'         4'     BAPS board ant/post, inv/ev                Tandem walking  2x at bar 2x at bar 2x at bar      2x at bar  2x at bar   y-balance  2x5 2x5 2x5 cone cue            Great toe Ext                Toe Yoga                                 Ther Ex 5/15               Standing heel raises 2x10 2x10 2x10 2x10     2x10  2x10   Alphabet 2x 2x 2x 2x     alphabet 1x  alphabet 2x  alphabet 2x   Long sitting calf stretch 5x20\"  20\" 5x 20\"x3 standing     gastroc   4x30\"  5x 20\"  5x 20\"   Ankle 4-way  TBand GTB 20x ea        GTB 20x ea        Seated HR/TR         20x ea  6# 30x 6#  30x 6#   ProStretch  5x 20\" 20\" 5x  20\" 5x      standing 20\"x6  5x 20\"  5x 20\"   Mini static lunge on foam                Nustep for LE ROM 5' w/ UEs 5' Recembent bike L1 5 min 5'     NV       Ther Activity 5/15               Biodex weight shifting  2' " "lateral 2' fwd/bwd lvl 4 2' lateral  2' Fwd/Bwd  lvl 4 2' lateral  2' Fwd/Bwd  lvl 4     10\"x10 lat 100%  5\"x10 fwd/bwd  on R  2' lateral 2' fwd/bwd lvl 4  2' lateral 2' fwd/bwd lvl 4   Mini squats 2x10 2x10 2x10 2x10     2x10 2x10   Step ups  2x10 6\" 6\" step 2x10 6\" step 2x10         Step downs    nv         Gait Training                Ambulation in lace up brace                                 Modalities                CP                                                          "

## 2023-06-08 ENCOUNTER — OFFICE VISIT (OUTPATIENT)
Dept: PHYSICAL THERAPY | Facility: CLINIC | Age: 48
End: 2023-06-08
Payer: OTHER MISCELLANEOUS

## 2023-06-08 DIAGNOSIS — S82.891D CLOSED FRACTURE OF RIGHT ANKLE WITH ROUTINE HEALING, SUBSEQUENT ENCOUNTER: Primary | ICD-10-CM

## 2023-06-08 PROCEDURE — 97112 NEUROMUSCULAR REEDUCATION: CPT

## 2023-06-08 PROCEDURE — 97110 THERAPEUTIC EXERCISES: CPT

## 2023-06-08 PROCEDURE — 97140 MANUAL THERAPY 1/> REGIONS: CPT

## 2023-06-08 PROCEDURE — 97530 THERAPEUTIC ACTIVITIES: CPT

## 2023-06-08 NOTE — PROGRESS NOTES
"Daily Note     Today's date: 2023  Patient name: Lakshmi Alex  : 1975  MRN: [id number]  Referring provider: Sugar Nuñez MD  Dx:   Encounter Diagnosis     ICD-10-CM    1  Closed fracture of right ankle with routine healing, subsequent encounter  [id number]                      Subjective: Pt reports intermittent stabbing pain but she cannot replicate the pain  Objective: See treatment diary below      Assessment: Tolerated treatment well  Good effort noted with all exercises as outlined below  Increased laps for tandem amb  Added step downs from 2\" step but this caused pain so only 5x performed  Patient demonstrated fatigue post treatment, exhibited good technique with therapeutic exercises and would benefit from continued PT      Plan: Continue per plan of care  Precautions: HTN, ORIF on      Manuals 5/15 5/23 6/1 6/5 6/8     5/9  5/11   PROM R ankle LH JOANN CD KT KT     5'     Grade I/II post mobs LH           4' subtalar   Gr   I joint distraction LH           2'    subtalar mobilizations  JOANN             Neuro Re-Ed 5/15              Pt education  4'        4'     BAPS board ant/post, inv/ev               Tandem walking  2x at bar 2x at bar 2x at bar 3x at bar    2x at bar  2x at bar   y-balance  2x5 2x5 2x5 cone cue 2x5 cone cue          Great toe Ext               Toe Yoga                               Ther Ex 5/15              Standing heel raises 2x10 2x10 2x10 2x10 2x10    2x10  2x10   Alphabet 2x 2x 2x 2x 2x     alphabet 2x  alphabet 2x   Long sitting calf stretch 5x20\"  20\" 5x 20\"x3 standing 20\"x5     5x 20\"  5x 20\"   Ankle 4-way  TBand GTB 20x ea              Seated HR/TR         30x 6#  30x 6#   ProStretch  5x 20\" 20\" 5x  20\" 5x  20\" 5x      5x 20\"  5x 20\"   Mini static lunge on foam               Nustep for LE ROM 5' w/ UEs 5' Recembent bike L1 5 min 5' 5'          Ther Activity 5/15              Biodex weight shifting  2' lateral 2' fwd/bwd lvl 4 2' lateral  2' " "Fwd/Bwd  lvl 4 2' lateral  2' Fwd/Bwd  lvl 4 2' lateral  2' Fwd/Bwd  lvl 4     2' lateral 2' fwd/bwd lvl 4  2' lateral 2' fwd/bwd lvl 4   Mini squats 2x10 2x10 2x10 2x10 2x10    2x10 2x10   Step ups  2x10 6\" 6\" step 2x10 6\" step 2x10 6\" step 2x10        Step downs    nv 2\" 1x5        Gait Training               Ambulation in lace up brace                               Modalities               CP                                                          "

## 2023-06-09 ENCOUNTER — TELEPHONE (OUTPATIENT)
Dept: OBGYN CLINIC | Facility: HOSPITAL | Age: 48
End: 2023-06-09

## 2023-06-09 NOTE — TELEPHONE ENCOUNTER
Caller: eDrian Levi- disability     Doctor/Office: Mini    CB#: n/a      What needs to be faxed: office notes and works status 5/16    ATTN to: case # [id number]    Fax#: [phone]      Documents were successfully e-faxed

## 2023-06-12 ENCOUNTER — APPOINTMENT (OUTPATIENT)
Dept: PHYSICAL THERAPY | Facility: CLINIC | Age: 48
End: 2023-06-12
Payer: OTHER MISCELLANEOUS

## 2023-06-12 DIAGNOSIS — S82.891D CLOSED FRACTURE OF RIGHT ANKLE WITH ROUTINE HEALING, SUBSEQUENT ENCOUNTER: Primary | ICD-10-CM

## 2023-06-12 NOTE — PROGRESS NOTES
"Daily Note     Today's date: 2023  Patient name: Cheko Coleman  : 1975  MRN: [id number]  Referring provider: Edd Hurd MD  Dx:   Encounter Diagnosis     ICD-10-CM    1  Closed fracture of right ankle with routine healing, subsequent encounter  [id number]                      Subjective: ***      Objective: See treatment diary below      Assessment: Tolerated treatment well  Patient demonstrated fatigue post treatment, exhibited good technique with therapeutic exercises and would benefit from continued PT      Plan: Continue per plan of care  Precautions: HTN, ORIF on     Insurance:   CMS/ AMA POC expires PT/OT + Visit Limit? Co-Insurance   [place] [facility]   [facility] BOMN No                Visit/Unit Tracking: FOTO 5th visit  Date 5/1 5/9 5/11 5/15 5/23 6/1 6/5 6/8                                       FOTO         Manuals 5/15 5/23 6/1 6/5 6/8 6/12    5/9  5/11   PROM R ankle LH JOANN CD KT KT     5'     Grade I/II post mobs LH           4' subtalar   Gr   I joint distraction LH           2'    subtalar mobilizations  JOANN             Neuro Re-Ed 5/15              Pt education  4'        4'     BAPS board ant/post, inv/ev               Tandem walking  2x at bar 2x at bar 2x at bar 3x at bar    2x at bar  2x at bar   y-balance  2x5 2x5 2x5 cone cue 2x5 cone cue          Great toe Ext               Toe Yoga                               Ther Ex /15              Standing heel raises 2x10 2x10 2x10 2x10 2x10    2x10  2x10   Alphabet 2x 2x 2x 2x 2x     alphabet 2x  alphabet 2x   Long sitting calf stretch 5x20\"  20\" 5x 20\"x3 standing 20\"x5     5x 20\"  5x 20\"   Ankle 4-way  TBand GTB 20x ea              Seated HR/TR         30x 6#  30x 6#   ProStretch  5x 20\" 20\" 5x  20\" 5x  20\" 5x      5x 20\"  5x 20\"   Mini static lunge on foam               Nustep for LE ROM 5' w/ UEs 5' Recembent bike L1 5 min 5' 5'          Ther Activity 5/15              Biodex weight shifting  2' lateral 2' " "fwd/bwd lvl 4 2' lateral  2' Fwd/Bwd  lvl 4 2' lateral  2' Fwd/Bwd  lvl 4 2' lateral  2' Fwd/Bwd  lvl 4     2' lateral 2' fwd/bwd lvl 4  2' lateral 2' fwd/bwd lvl 4   Mini squats 2x10 2x10 2x10 2x10 2x10    2x10 2x10   Step ups  2x10 6\" 6\" step 2x10 6\" step 2x10 6\" step 2x10        Step downs    nv 2\" 1x5        Gait Training               Ambulation in lace up brace                               Modalities               CP                                                            "

## 2023-06-13 ENCOUNTER — HOSPITAL ENCOUNTER (OUTPATIENT)
Dept: MAMMOGRAPHY | Facility: CLINIC | Age: 48
Discharge: HOME | End: 2023-06-13
Payer: COMMERCIAL

## 2023-06-13 VITALS — BODY MASS INDEX: 39.78 KG/M2 | WEIGHT: 233 LBS | HEIGHT: 64 IN

## 2023-06-13 DIAGNOSIS — Z12.31 ENCOUNTER FOR SCREENING MAMMOGRAM FOR MALIGNANT NEOPLASM OF BREAST: ICD-10-CM

## 2023-06-13 PROCEDURE — 77067 SCR MAMMO BI INCL CAD: CPT

## 2023-06-13 PROCEDURE — 77063 BREAST TOMOSYNTHESIS BI: CPT

## 2023-06-13 NOTE — PROGRESS NOTES
2XC- section   LMP: 2023  PMB:  SA:  YES  HPV:   NO   Birth control:  NO  Last pap: 2022 Negative HPV Negative   Last mammo: 2023  Last colonoscopy: 2022 RTO in 10 years   Last Dexa: Not on file  Family History: NO GYN cancer in the family history       Mother - Esophageal cancer

## 2023-06-13 NOTE — PROGRESS NOTES
Diagnoses and all orders for this visit:    Encounter for gynecological examination without abnormal finding    Encounter for screening mammogram for malignant neoplasm of breast  -     Mammo screening bilateral w 3d & cad; Future        Perineal hygiene reviewed   Weight bearing exercises minium of 150 mins/weekly advised  Kegel exercises recommended  SBE encouraged, ASCCP guidelines reviewed  Condoms encouraged with all sexual activity to prevent STI's  Gardisil vaccines recommended up to age 39  Calcium/ Vit D dietary requirements discussed,   Advised to call with any issues,  all concerns & questions addressed  See provided information in your after visit summary     F/U Annually and PRN      Health Maintenance:    Last PAP: 2022 Neg/Neg  Next PAP Due:     Last Mammogram: 2023    Life time Monse Pastrana % 5 11, Density A almost entirely fatty, Bi-Rads 2 Benign  Next Mammogram: order given    Last Colonoscopy: 2022   RTO 10 years     Gardisil: Not completed       Subjective    CC: Yearly Exam      Gildardo Dang is a 49 y o  female here for an annual exam  Nathanael Wang  GYN hx includes:  x2, menorrhagia  No personal Hx of breast, cervical, ovarian or colon CA  Family hx of:  No GYN cancers, her mother had esophageal cancer and passed at age 28  Medically stable, she feel and broke her ankle in December, just getting back to work post surgery   follows with PMD    Patient's last menstrual period was 2023 (approximate)  Her menstrual cycles are becoming irregular, she will ocassionaly skip   She denies issues with bleeding during her menses  Still has heavy menses they are managable to her   Denies history of abnormal pap smear  She denies breast concerns, abnormal vaginal discharge, vaginal itching, odor, irritation, bowel/bladder dysfunction, urinary symptoms, pelvic pain, or dyspareunia today   Does still have urinary leakage at times, declines PT referral, advised "Kegel's daily   She is sexually active  Monogamous relationship  Her current method of contraception includes  none  Denies any issues with her BCM  She does not want STD testing today    Denies intimate partner violence    Works at [employer]     Past Medical History:   Diagnosis Date   • Allergic    • GERD (gastroesophageal reflux disease)    • Hypertension    • Sleep apnea     does not use cpap \"yet\"   • Sleep apnea, obstructive    • Varicella      Past Surgical History:   Procedure Laterality Date   •  SECTION      x2    • MD OPEN TX DISTAL FIBULAR FRACTURE LAT MALLEOLUS Right 2022    Procedure: OPEN REDUCTION W/ INTERNAL FIXATION (ORIF) RIGHT ANKLE BIMALL EQUIV FRACTURE;  Surgeon: Haris Bowers MD;  Location: [facility];  Service: Orthopedics       Immunization History   Administered Date(s) Administered   • COVID-19 PFIZER VACCINE 0 3 ML IM 2021, 2021   • COVID-19 Pfizer vac (Gary-sucrose, gray cap) 12 yr+ IM 2022       Family History   Problem Relation Age of Onset   • Esophageal cancer Mother    • Heart disease Father    • No Known Problems Sister    • No Known Problems Daughter    • No Known Problems Maternal Grandmother    • No Known Problems Maternal Grandfather    • No Known Problems Paternal Grandmother    • No Known Problems Paternal Grandfather    • No Known Problems Sister    • No Known Problems Brother    • No Known Problems Brother    • No Known Problems Son    • No Known Problems Maternal Aunt    • No Known Problems Paternal Aunt    • No Known Problems Paternal Aunt    • No Known Problems Paternal Aunt    • No Known Problems Paternal Aunt    • No Known Problems Paternal Aunt      Social History     Tobacco Use   • Smoking status: Never   • Smokeless tobacco: Never   Vaping Use   • Vaping Use: Never used   Substance Use Topics   • Alcohol use: Not Currently   • Drug use: Never       Current Outpatient Medications:   •  Diclofenac Sodium (VOLTAREN) 1 %, Apply 2 g " topically 4 (four) times a day, Disp: 100 g, Rfl: 0  •  Iron-Vitamin C (Vitron-C)  MG TABS, Take 1 tablet by mouth 2 (two) times a day, Disp: 60 tablet, Rfl: 3  •  levothyroxine (Levo-T) 75 mcg tablet, Take 1 tablet (75 mcg total) by mouth daily in the early morning, Disp: 30 tablet, Rfl: 5  •  multivitamin (THERAGRAN) TABS, Take 1 tablet by mouth daily, Disp: , Rfl:   •  sertraline (ZOLOFT) 50 mg tablet, Take 1 tablet (50 mg total) by mouth daily, Disp: 90 tablet, Rfl: 5  •  VITAMIN D PO, Take by mouth, Disp: , Rfl:   •  aspirin (ECOTRIN LOW STRENGTH) 81 mg EC tablet, Take 1 tablet (81 mg total) by mouth every 12 (twelve) hours, Disp: 84 tablet, Rfl: 0  Patient Active Problem List    Diagnosis Date Noted   • Achilles tendinitis of right lower extremity 2023   • Aftercare following surgery 2023   • Closed bimalleolar fracture of right ankle 01/10/2023   • Closed fracture of right ankle 2022   • Sinus congestion 7183   • Family conflict    • Adjustment disorder with mixed anxiety and depressed mood 10/05/2022   • COVID-19 2022   • Acute nasopharyngitis 2022   • Flu-like symptoms 2022   • Morbid obesity ([address] ) 2022   • Chronic bilateral low back pain with sciatica 2022   • Muscle spasms of both lower extremities 2022   • LESTER (obstructive sleep apnea)    • Snoring    • Hypothyroidism 2022   • Iron deficiency 2022   • Nausea 2022   • Anxiety and depression 2021   • Fatigue 2021   • Annual physical exam 2021   • Menorrhagia with irregular cycle 12/10/2020   • Right foot pain 12/10/2020   • Encounter to establish care 2020       No Known Allergies    OB History    Para Term  AB Living   2 2 2     2   SAB IAB Ectopic Multiple Live Births           2      # Outcome Date GA Lbr Db/2nd Weight Sex Delivery Anes PTL Lv   2 Term            1 Term               Obstetric Comments     2 x c- sections "      Vitals:    06/14/23 1355   BP: 124/80   BP Location: Left arm   Patient Position: Sitting   Cuff Size: Large   Weight: 105 kg (232 lb)   Height: 5' 4\" (1 626 m)     Body mass index is 39 82 kg/m²  Review of Systems     Constitutional: Negative for chills, fatigue, fever, headaches, visual disturbances, and unexpected weight change  Respiratory: Negative for cough, & shortness of breath  Cardiovascular: Negative for chest pain       Gastrointestinal: Negative for Abd pain, nausea & vomiting, constipation and diarrhea  Genitourinary: Negative for difficulty urinating, dysuria, hematuria, dyspareunia, unusual vaginal bleeding or discharge  Skin: Negative skin changes    Physical Exam     Constitutional: Alert & Oriented x3, well-developed and well-nourished  No distress  HENT: Atraumatic, Normocephalic, Conjunctivae clear  Neck: Normal range of motion  Neck supple  No thyromegaly, mass, nodules or tenderness  Pulmonary: Effort normal    Abdominal: Soft  No tenderness or masses  Musculoskeletal: Normal ROM  Skin: Warm & Dry  Psychological: Normal mood, thought content, behavior & judgement     Breasts:   Right: tissue soft without masses, tenderness, skin changes or nipple discharge  No areas of erythema or pain  No subclavicular, axillary, pectoral adenopathy  Left:  tissue soft without masses, tenderness, skin changes or nipple discharge  No areas of erythema or pain  No subclavicular, axillary, pectoral adenopathy    Pelvic exam was performed with patient supine, lithotomy position  Labia: Negative rash, tenderness, lesion or injury on the right labia  Negative rash, tenderness, lesion or injury on the left labia  Urethral meatus:  Negative for  tenderness, inflammation or discharge  Uterus: not deviated, enlarged, fixed or tender  Cervix: No CMT, no discharge or friability  Right adnexa: no mass, no tenderness and no fullness    Left adnexa: no mass, no tenderness and no " fullness  Vagina: No erythema, tenderness, masses, or foreign body in the vagina  No signs of injury around the vagina  No unusual vaginal discharge   Perineum without lesions, signs of injury, erythema or swelling  Inguinal Canal:        Right: No inguinal adenopathy or hernia present  Left: No inguinal adenopathy or hernia present

## 2023-06-13 NOTE — PATIENT INSTRUCTIONS
Breast Self Exam for Women   AMBULATORY CARE:   A breast self-exam (BSE)  is a way to check your breasts for lumps and other changes  Regular BSEs can help you know how your breasts normally look and feel  Most breast lumps or changes are not cancer, but you should always have them checked by a healthcare provider  Why you should do a BSE:  Breast cancer is the most common type of cancer in women  Even if you have mammograms, you may still want to do a BSE regularly  If you know how your breasts normally feel and look, it may help you know when to contact your healthcare provider  Mammograms can miss some cancers  You may find a lump during a BSE that did not show up on a mammogram   When you should do a BSE:  If you have periods, you may want to do your BSE 1 week after your period ends  This is the time when your breasts may be the least swollen, lumpy, or tender  You can do regular BSEs even if you are breastfeeding or have breast implants  Call your doctor if:   You find any lumps or changes in your breasts  You have breast pain or fluid coming from your nipples  You have questions or concerns about your condition or care  How to do a BSE:       Look at your breasts in a mirror  Look at the size and shape of each breast and nipple  Check for swelling, lumps, dimpling, scaly skin, or other skin changes  Look for nipple changes, such as a nipple that is painful or beginning to pull inward  Gently squeeze both nipples and check to see if fluid (that is not breast milk) comes out of them  If you find any of these or other breast changes, contact your healthcare provider  Check your breasts while you sit or  the following 3 positions:    [place] your arms down at your sides  Raise your hands and join them behind your head  Put firm pressure with your hands on your hips  Bend slightly forward while you look at your breasts in the mirror  Lie down and feel your breasts    When you lie down, your breast tissue spreads out evenly over your chest  This makes it easier for you to feel for lumps and anything that may not be normal for your breasts  Do a BSE on one breast at a time  Place a small pillow or towel under your left shoulder  Put your left arm behind your head  Use the 3 middle fingers of your right hand  Use your fingertip pads, on the top of your fingers  Your fingertip pad is the most sensitive part of your finger  Use small circles to feel your breast tissue  Use your fingertip pads to make dime-sized, overlapping circles on your breast and armpits  Use light, medium, and firm pressure  First, press lightly  Second, press with medium pressure to feel a little deeper into the breast  Last, use firm pressure to feel deep within your breast     Examine your entire breast area  Examine the breast area from above the breast to below the breast where you feel only ribs  Make small circles with your fingertips, starting in the middle of your armpit  Make circles going up and down the breast area  Continue toward your breast and all the way across it  Examine the area from your armpit all the way over to the middle of your chest (breastbone)  Stop at the middle of your chest     Move the pillow or towel to your right shoulder, and put your right arm behind your head  Use the 3 fingertip pads of your left hand, and repeat the above steps to do a BSE on your right breast   What else you can do to check for breast problems or cancer:  Talk to your healthcare provider about mammograms  A mammogram is an x-ray of your breasts to screen for breast cancer or other problems  Your provider can tell you the benefits and risks of mammograms  The first mammogram is usually at age 39 or 48  Your provider may recommend you start at 36 or younger if your risk for breast cancer is high  Mammograms usually continue every 1 to 2 years until age 76         Follow up with your doctor as directed:  Write down your questions so you remember to ask them during your visits  © Copyright Pati Fix 2022 Information is for End User's use only and may not be sold, redistributed or otherwise used for commercial purposes  The above information is an  only  It is not intended as medical advice for individual conditions or treatments  Talk to your doctor, nurse or pharmacist before following any medical regimen to see if it is safe and effective for you  Wellness Visit for Adults   AMBULATORY CARE:   A wellness visit  is when you see your healthcare provider to get screened for health problems  Your healthcare provider will also give you advice on how to stay healthy  Write down your questions so you remember to ask them  Ask your healthcare provider how often you should have a wellness visit  What happens at a wellness visit:  Your healthcare provider will ask about your health, and your family history of health problems  This includes high blood pressure, heart disease, and cancer  He or she will ask if you have symptoms that concern you, if you smoke, and about your mood  You may also be asked about your intake of medicines, supplements, food, and alcohol  Any of the following may be done: Your weight  will be checked  Your height may also be checked so your body mass index (BMI) can be calculated  Your BMI shows if you are at a healthy weight  Your blood pressure  and heart rate will be checked  Your temperature may also be checked  Blood and urine tests  may be done  Blood tests may be done to check your cholesterol levels  Abnormal cholesterol levels increase your risk for heart disease and stroke  You may also need a blood or urine test to check for diabetes if you are at increased risk  Urine tests may be done to look for signs of an infection or kidney disease  A physical exam  includes checking your heartbeat and lungs with a stethoscope   Your healthcare provider may also check your skin to look for sun damage  Screening tests  may be recommended  A screening test is done to check for diseases that may not cause symptoms  The screening tests you may need depend on your age, gender, family history, and lifestyle habits  For example, colorectal screening may be recommended if you are 48years old or older  Screening tests you need if you are a woman:   A Pap smear  is used to screen for cervical cancer  Pap smears are usually done every 3 to 5 years depending on your age  You may need them more often if you have had abnormal Pap smear test results in the past  Ask your healthcare provider how often you should have a Pap smear  A mammogram  is an x-ray of your breasts to screen for breast cancer  Experts recommend mammograms every 2 years starting at age 48 years  You may need a mammogram at age 52 years or younger if you have an increased risk for breast cancer  Talk to your healthcare provider about when you should start having mammograms and how often you need them  Vaccines you may need:   Get an influenza vaccine  every year  The influenza vaccine protects you from the flu  Several types of viruses cause the flu  The viruses change over time, so new vaccines are made each year  Get a tetanus-diphtheria (Td) booster vaccine  every 10 years  This vaccine protects you against tetanus and diphtheria  Tetanus is a severe infection that may cause painful muscle spasms and lockjaw  Diphtheria is a severe bacterial infection that causes a thick covering in the back of your mouth and throat  Get a human papillomavirus (HPV) vaccine  if you are female and aged 23 to 32 or male 23 to 24 and never received it  This vaccine protects you from HPV infection  HPV is the most common infection spread by sexual contact  HPV may also cause vaginal, penile, and anal cancers  Get a pneumococcal vaccine  if you are aged 72 years or older   The pneumococcal vaccine is an injection given to protect you from pneumococcal disease  Pneumococcal disease is an infection caused by pneumococcal bacteria  The infection may cause pneumonia, meningitis, or an ear infection  Get a shingles vaccine  if you are 60 or older, even if you have had shingles before  The shingles vaccine is an injection to protect you from the varicella-zoster virus  This is the same virus that causes chickenpox  Shingles is a painful rash that develops in people who had chickenpox or have been exposed to the virus  How to eat healthy:  My Plate is a model for planning healthy meals  It shows the types and amounts of foods that should go on your plate  Fruits and vegetables make up about half of your plate, and grains and protein make up the other half  A serving of dairy is included on the side of your plate  The amount of calories and serving sizes you need depends on your age, gender, weight, and height  Examples of healthy foods are listed below:  Eat a variety of vegetables  such as dark green, red, and orange vegetables  You can also include canned vegetables low in sodium (salt) and frozen vegetables without added butter or sauces  Eat a variety of fresh fruits , canned fruit in 100% juice, frozen fruit, and dried fruit  Include whole grains  At least half of the grains you eat should be whole grains  Examples include whole-wheat bread, wheat pasta, brown rice, and whole-grain cereals such as oatmeal     Eat a variety of protein foods such as seafood (fish and shellfish), lean meat, and poultry without skin (turkey and chicken)  Examples of lean meats include pork leg, shoulder, or tenderloin, and beef round, sirloin, tenderloin, and extra lean ground beef  Other protein foods include eggs and egg substitutes, beans, peas, soy products, nuts, and seeds  Choose low-fat dairy products such as skim or 1% milk or low-fat yogurt, cheese, and cottage cheese  Limit unhealthy fats  such as butter, hard margarine, and shortening  Exercise:  Exercise at least 30 minutes per day on most days of the week  Some examples of exercise include walking, biking, dancing, and swimming  You can also fit in more physical activity by taking the stairs instead of the elevator or parking farther away from stores  Include muscle strengthening activities 2 days each week  Regular exercise provides many health benefits  It helps you manage your weight, and decreases your risk for type 2 diabetes, heart disease, stroke, and high blood pressure  Exercise can also help improve your mood  Ask your healthcare provider about the best exercise plan for you  General health and safety guidelines:   Do not smoke  Nicotine and other chemicals in cigarettes and cigars can cause lung damage  Ask your healthcare provider for information if you currently smoke and need help to quit  E-cigarettes or smokeless tobacco still contain nicotine  Talk to your healthcare provider before you use these products  Limit alcohol  A drink of alcohol is 12 ounces of beer, 5 ounces of wine, or 1½ ounces of liquor  Lose weight, if needed  Being overweight increases your risk of certain health conditions  These include heart disease, high blood pressure, type 2 diabetes, and certain types of cancer  Protect your skin  Do not sunbathe or use tanning beds  Use sunscreen with a SPF 15 or higher  Apply sunscreen at least 15 minutes before you go outside  Reapply sunscreen every 2 hours  Wear protective clothing, hats, and sunglasses when you are outside  Drive safely  Always wear your seatbelt  Make sure everyone in your car wears a seatbelt  A seatbelt can save your life if you are in an accident  Do not use your cell phone when you are driving  This could distract you and cause an accident  Pull over if you need to make a call or send a text message  Practice safe sex  Use latex condoms if are sexually active and have more than one partner   Your healthcare provider may recommend screening tests for sexually transmitted infections (STIs)  Wear helmets, lifejackets, and protective gear  Always wear a helmet when you ride a bike or motorcycle, go skiing, or play sports that could cause a head injury  Wear protective equipment when you play sports  Wear a lifejacket when you are on a boat or doing water sports  © Copyright Chetan Cassidy 2022 Information is for End User's use only and may not be sold, redistributed or otherwise used for commercial purposes  The above information is an  only  It is not intended as medical advice for individual conditions or treatments  Talk to your doctor, nurse or pharmacist before following any medical regimen to see if it is safe and effective for you  Kegel Exercises for Women   AMBULATORY CARE:   Kegel exercises  help strengthen your pelvic muscles  Pelvic muscles hold your pelvic organs, such as your bladder and uterus, in place  Kegel exercises help prevent or control certain conditions, such as urine incontinence (leakage) or uterine prolapse  Call your doctor or physical therapist if:   You cannot feel your muscles tighten or relax  You continue to leak urine  You have questions or concerns about your condition or care  Use the correct muscles:  Pelvic muscles are the muscles you use to control urine flow  To target these muscles, stop and start the flow of urine several times  This will help you become familiar with how it feels to tighten and relax these muscles  How to do Kegel exercises:   Get into a comfortable position  You may lie down, stand up, or sit down to do these exercises  When you first try to do these exercises, it may be easier if you lie down  Tighten or squeeze your pelvic muscles slowly  It may feel like you are trying to hold back urine or gas  Hold this position for 3 seconds  Relax for 3 seconds  Repeat this cycle 10 times   Do not hold your breath when you do Kegel exercises  Keep your stomach, back, and leg muscles relaxed  Do 10 sets of Kegel exercises, at least 3 times a day  When you know how to do Kegel exercises, use different positions  This will help to strengthen your pelvic muscles as much as possible  You can do these exercises while you lie on the floor, watch TV, or while you stand  Tighten your pelvic muscles before you sneeze, cough, or lift to prevent urine leakage  You may notice improved bladder control within about 6 weeks  Follow up with your doctor or physical therapist as directed:  Write down your questions so you remember to ask them during your visits  © Copyright Erica Civil 2022 Information is for End User's use only and may not be sold, redistributed or otherwise used for commercial purposes  The above information is an  only  It is not intended as medical advice for individual conditions or treatments  Talk to your doctor, nurse or pharmacist before following any medical regimen to see if it is safe and effective for you  Perineal Hygiene      Your vaginal naturally takes care of its self, it is a self washing system, the less you mess the healthier it will be     No soaps or feminine wash to the vulva, these products can cause dermitis, bacterial infections and other vulvar problems  Use only water to cleanse, or water with [employer] or [employer] if necessary  No scented lotions or products are advised in or near your vulva  Use only coconut oil for moisture if needed  No douching this may cause imbalance in your vaginal PH and further issues  If you wear panty liners, you may apply a thin coating of Vaseline, A&D ointment or coconut oil to the vulvar tissues as a skin barrier     [place] underware, loose fitting clothing  Only perfume-free, dye-free laundry detergent, use a second rinse cycle   Avoid fabric softeners/dryer sheets  Partner should avoid the same products as well         Over the counter probiotic to restore vaginal yosi may be helpful as well, take daily  You may also look into Boric Acid vaginal suppositories to restore vaginal PH balance for up to 2 weeks as directed on the box  You may not use these if you are pregnant      For vaginal dryness: You may use:     Coconut oil (organic, pure, unscented) as needed for moisture or lubrication  ( Do not use if allergic)       Replens moisture restore external comfort gel daily ( use as directed on the box)        Replens long lasting vaginal moisturizer  ( use as directed on the box)         For Vaginal Lubrication:          You may use:     Coconut oil (organic, pure, unscented) as a lubricant or another scent-free lubricant (Astroglide, Uberlube) if needed  Do not use coconut oil or silicone if using a condom as this may break down the integrity of the condom and cause an unplanned pregnancy              Do not use coconut oil if allergic               Replens silky smooth lubricant, premium silicone based lubricant for intercourse  ( use as directed, a small amount will provide an enhanced natural feeling)     Any premium over the counter vaginal lubricant water or silicone based  Silicone based will have more staying power  Menopause   WHAT YOU NEED TO KNOW:   What is menopause? Menopause is a normal stage in a woman's life when her monthly periods stop  You are considered to be in menopause when you have not had a period for a full year after the age of 36  Menopause usually occurs between ages 52 to 48  Perimenopause is a stage before menopause that may cause signs and symptoms similar to menopause  Perimenopause may start about 4 years before menopause  What causes menopause? Menopause starts when the ovaries stop making the female hormones estrogen and progesterone  After menopause, you are no longer able to become pregnant   Any of the following may trigger menopause or early menopause:  Surgery, including a hysterectomy or oophorectomy    Family history of early menopause    Smoking    Chemotherapy or pelvic radiation    Chromosome abnormalities, including Tillman syndrome and Fragile X syndrome    Premature ovarian insufficiency (the ovaries stop producing eggs before age 36)    What are the signs and symptoms of menopause? You may have any of the following:  Irregular menstrual cycles with heavy vaginal bleeding followed by decreased bleeding until it stops    Hot flashes (feeling warm, flushed, and sweaty)    Vaginal changes such as increased dryness    Mood changes such as anxiety, depression, or decreased desire to have sex    Trouble sleeping, joint pain, headaches    Brittle nails, hair on chin or chest where it is normally absent    Decrease in breast size and change in skin texture    Weight gain    How is menopause treated or managed? Hormone replacement therapy (HRT) is medicine that replaces your low hormone levels  HRT contains estrogen and sometimes progestin  HRT has several benefits  HRT helps prevent osteoporosis, which decreases your risk for bone fractures  HRT also protects you from colorectal cancer  HRT also has some risks  HRT increases your risk for breast cancer, blood clots, heart disease, a heart attack, or a stroke  If you are 72 years or older, HRT can also increase your risk for dementia  Your risk for uterine or endometrial cancer is higher if you take estrogen-only HRT  Manage hot flashes  Hot flashes are brief periods of feeling very warm, flushed, and sweaty  Hot flashes can last from a few seconds to several minutes  They may happen many times during the day, and are common at night  Layer your clothing so that you can easily remove some clothing and cool yourself during a hot flash  Cold drinks may also be helpful  Non-hormone medicines can help relieve or prevent hot flashes   Examples include certain antidepressants, nerve medicines, and high blood pressure medicines  Reduce vaginal dryness by using over-the-counter vaginal creams  Vaginal dryness may cause you to have pain or discomfort during sex  Only use creams that are made for vaginal use  Do  not  use petroleum jelly  You may put an estrogen cream in and around your vagina  Estrogen cream may help decrease vaginal dryness and lower your risk of vaginal infections  Continue to use birth control during perimenopause if you do not want to get pregnant  You may need to use birth control until it has been 1 year since your periods stopped  Ask your healthcare provider when you can stop using birth control to prevent pregnancy  How can I live a healthy lifestyle during and after menopause? After menopause, your risk for heart disease and bone loss increases  Ask about these and other ways to stay healthy:  Exercise regularly  Exercise helps you maintain a healthy weight  Exercise can also help to control your blood pressure and cholesterol levels  Include weight-bearing exercise for strong bones  Weight bearing exercise is recommended for at least 30 minutes, 3 times a week  Ask your healthcare provider about the best exercise plan for you  Eat a variety of healthy foods  Include fruits, vegetables, whole grains (whole-wheat bread, pasta, and cereals), low-fat dairy, and lean protein foods (beans, poultry, and fish)  Limit foods high in sodium (salt)  Ask your healthcare provider for more information about a meal plan that is right for you  Do not smoke  If you smoke, it is never too late to quit  You are more likely to have a heart attack, lung disease, blood clots, and cancer if you smoke  Ask your healthcare provider for information if you need help quitting  Take supplements as directed  You may need extra calcium and vitamin D to help prevent osteoporosis  Limit alcohol and caffeine  Alcohol and caffeine may worsen your symptoms  When should I call my doctor? You have vaginal bleeding after menopause  You have questions or concerns about your condition or care  CARE AGREEMENT:   You have the right to help plan your care  Learn about your health condition and how it may be treated  Discuss treatment options with your healthcare providers to decide what care you want to receive  You always have the right to refuse treatment  The above information is an  only  It is not intended as medical advice for individual conditions or treatments  Talk to your doctor, nurse or pharmacist before following any medical regimen to see if it is safe and effective for you  © Copyright Chinedu Bonner 2022 Information is for End User's use only and may not be sold, redistributed or otherwise used for commercial purposes

## 2023-06-14 ENCOUNTER — ANNUAL EXAM (OUTPATIENT)
Dept: OBGYN CLINIC | Facility: CLINIC | Age: 48
End: 2023-06-14
Payer: COMMERCIAL

## 2023-06-14 VITALS
SYSTOLIC BLOOD PRESSURE: 124 MMHG | WEIGHT: 232 LBS | HEIGHT: 64 IN | BODY MASS INDEX: 39.61 KG/M2 | DIASTOLIC BLOOD PRESSURE: 80 MMHG

## 2023-06-14 DIAGNOSIS — Z12.31 ENCOUNTER FOR SCREENING MAMMOGRAM FOR MALIGNANT NEOPLASM OF BREAST: ICD-10-CM

## 2023-06-14 DIAGNOSIS — Z01.419 ENCOUNTER FOR GYNECOLOGICAL EXAMINATION WITHOUT ABNORMAL FINDING: Primary | ICD-10-CM

## 2023-06-14 PROBLEM — Z00.00 ANNUAL PHYSICAL EXAM: Status: RESOLVED | Noted: 2021-08-11 | Resolved: 2023-06-14

## 2023-06-14 PROCEDURE — 99396 PREV VISIT EST AGE 40-64: CPT | Performed by: OBSTETRICS & GYNECOLOGY

## 2023-06-15 ENCOUNTER — EVALUATION (OUTPATIENT)
Dept: PHYSICAL THERAPY | Facility: CLINIC | Age: 48
End: 2023-06-15
Payer: OTHER MISCELLANEOUS

## 2023-06-15 DIAGNOSIS — S82.891D CLOSED FRACTURE OF RIGHT ANKLE WITH ROUTINE HEALING, SUBSEQUENT ENCOUNTER: Primary | ICD-10-CM

## 2023-06-15 PROCEDURE — 97110 THERAPEUTIC EXERCISES: CPT | Performed by: PHYSICAL THERAPIST

## 2023-06-15 PROCEDURE — 97530 THERAPEUTIC ACTIVITIES: CPT | Performed by: PHYSICAL THERAPIST

## 2023-06-15 PROCEDURE — 97112 NEUROMUSCULAR REEDUCATION: CPT | Performed by: PHYSICAL THERAPIST

## 2023-06-15 PROCEDURE — 97140 MANUAL THERAPY 1/> REGIONS: CPT | Performed by: PHYSICAL THERAPIST

## 2023-06-15 NOTE — PROGRESS NOTES
PT Re-Evaluation     Today's date: 6/15/2023  Patient name: Alaina Mcginnis  : 1975  MRN: [id number]  Referring provider: Mena Ramirez MD  Dx:   Encounter Diagnosis     ICD-10-CM    1  Closed fracture of right ankle with routine healing, subsequent encounter  S82 891D           Start Time: 1630  Stop Time: 0848  Total time in clinic (min): 45 minutes    Assessment  Assessment details: Pt is a 50 y/o female presenting to physical therapy s/p ORIF of the R ankle on   Upon re-examination, pt has improved strength and ROM of her R ankle as well as decreased of the same area  Pt's FOTO score has also improved, demonstrating an improved ability to perform functional activities such as ambulation and stair negotiation  However, pt continues to have strength and ROM deficits of her R ankle as well as pain and difficulty with functional activities  Pt plan of care will focus on improving the previously mentioned deficits and limitations  Pt would continue to benefit from skilled physical therapy to facilitate a return to her prior level of function  Impairments: abnormal gait, abnormal or restricted ROM, activity intolerance, impaired physical strength, lacks appropriate home exercise program, pain with function and weight-bearing intolerance  Functional limitations: self-care/ADLs, household activities, ambulation, and stair negotiation  Symptom irritability: high  Goals  STG - 4 weeks  1  Pt will have a subjective decrease in pain of her R ankle by 2 levels or more during during ambulation Ongoing  2  Pt will be able to ambulate with a normalized pattern with least restrictive device to facilitate a return to her prior level of function Ongoing    LTG - 8 weeks  1  Pt will demonstrate WFL AROM of her R ankle in all planes to facilitate a return to her prior level of function Ongoing  2  Pt's FOTO score will improve from 26 to 53 or better to facilitate a return to pt's prior level of function  Ongoing  Plan  Patient would benefit from: PT eval and skilled physical therapy  Planned modality interventions: cryotherapy, thermotherapy: hydrocollator packs and unattended electrical stimulation  Planned therapy interventions: activity modification, ADL training, balance/weight bearing training, behavior modification, flexibility, functional ROM exercises, gait training, graded exercise, home exercise program, joint mobilization, manual therapy, massage, Ford taping, neuromuscular re-education, patient education, self care, strengthening, stretching, therapeutic activities and therapeutic exercise  Frequency: 2x week  Duration in weeks: 8  Plan of Care beginning date: 6/15/2023  Plan of Care expiration date: 8/10/2023  Treatment plan discussed with: patient        Subjective Evaluation    History of Present Illness  Mechanism of injury: Pt is a 48 y/o female presenting to physical therapy s/p ORIF of the R ankle on   Pt reports she is now having decreased pain and improved motion of her R ankle as well as improved standing and walking tolerance  Pt reports she is now able to perform her household and work activities with greater ease  However, pt reports she continues to have pain and weakness in her R ankle with prolonged activities at work such as standing and walking  Pt reports she will also continue to have significant pain at times  Pt reports she has overall improved since starting therapy  Quality of life: good    Pain  Current pain ratin  At best pain ratin  At worst pain ratin  Location: R ankle  Quality: dull ache  Relieving factors: medications and rest  Aggravating factors: standing, walking and stair climbing  Progression: improved    Treatments  Previous treatment: medication  Current treatment: physical therapy  Patient Goals  Patient goal: to be able to walk and swim        Objective     Observations     Right Ankle/Foot   Positive for edema       Additional "Observation Details  9 5 cm incision healed    Tenderness     Right Ankle/Foot   Tenderness in the lateral malleolus  Active Range of Motion     Right Ankle/Foot   Dorsiflexion (ke): 5 degrees   Plantar flexion: 35 degrees   Inversion: 25 degrees with pain  Eversion: 16 degrees     Passive Range of Motion     Right Ankle/Foot    Dorsiflexion (ke): 8 degrees   Plantar flexion: 40 degrees   Inversion: 30 degrees   Eversion: 20 degrees     Strength/Myotome Testing     Right Ankle/Foot   Dorsiflexion: 3+  Plantar flexion: 3+  Inversion: 3+  Eversion: 3+    Swelling   Left Ankle/Foot   Malleoli: 29 cm    Right Ankle/Foot   Malleoli: 29 cm                   Precautions: HTN, ORIF on 12/29    POC expires Auth Status Unit limit Start date  Expiration date PT/OT + Visit Limit? 8/10 N/A N/A 2/20/23 12/31/23 BOMN             FOTO last done on 6/15           Manuals 5/15 5/23 6/1 6/5 6/8 6/15    5/9  5/11   PROM R ankle LH JOANN CD KT KT JOANN    5'     Grade I/II post mobs LH           4' subtalar   Gr   I joint distraction LH           2'    subtalar mobilizations  JOANN             Neuro Re-Ed 5/15              Pt education  4'    4'    4'     BAPS board ant/post, inv/ev               Tandem walking  2x at bar 2x at bar 2x at bar 3x at bar 3x at bar   2x at bar  2x at bar   y-balance  2x5 2x5 2x5 cone cue 2x5 cone cue 2x5         Great toe Ext               Toe Yoga                               Ther Ex 5/15              Standing heel raises 2x10 2x10 2x10 2x10 2x10 2x10   2x10  2x10   Alphabet 2x 2x 2x 2x 2x     alphabet 2x  alphabet 2x   Long sitting calf stretch 5x20\"  20\" 5x 20\"x3 standing 20\"x5 20\"x5    5x 20\"  5x 20\"   Ankle 4-way  TBand GTB 20x ea              Seated HR/TR         30x 6#  30x 6#   ProStretch  5x 20\" 20\" 5x  20\" 5x  20\" 5x  20\"x5    5x 20\"  5x 20\"   Mini static lunge on foam               Nustep for LE ROM 5' w/ UEs 5' Recembent bike L1 5 min 5' 5' 6'         Ther Activity 5/15              Biodex motor " "control  2' lateral 2' fwd/bwd lvl 4 2' lateral  2' Fwd/Bwd  lvl 4 2' lateral  2' Fwd/Bwd  lvl 4 2' lateral  2' Fwd/Bwd  lvl 4 3x lvl 6    2' lateral 2' fwd/bwd lvl 4  2' lateral 2' fwd/bwd lvl 4   Mini squats 2x10 2x10 2x10 2x10 2x10 2x10   2x10 2x10   Step ups  2x10 6\" 6\" step 2x10 6\" step 2x10 6\" step 2x10 2x10 6\"       Step downs    nv 2\" 1x5 2x10 4\"       Gait Training               Ambulation in lace up brace                               Modalities               CP                                 "

## 2023-06-18 RX ORDER — GABAPENTIN 300 MG/1
CAPSULE ORAL
Qty: 90 CAPSULE | Refills: 1 | OUTPATIENT
Start: 2023-06-18

## 2023-06-22 ENCOUNTER — OFFICE VISIT (OUTPATIENT)
Dept: PHYSICAL THERAPY | Facility: CLINIC | Age: 48
End: 2023-06-22
Payer: OTHER MISCELLANEOUS

## 2023-06-22 DIAGNOSIS — S82.891D CLOSED FRACTURE OF RIGHT ANKLE WITH ROUTINE HEALING, SUBSEQUENT ENCOUNTER: Primary | ICD-10-CM

## 2023-06-22 PROCEDURE — 97110 THERAPEUTIC EXERCISES: CPT

## 2023-06-22 PROCEDURE — 97140 MANUAL THERAPY 1/> REGIONS: CPT

## 2023-06-22 NOTE — PROGRESS NOTES
"Daily Note     Today's date: 2023  Patient name: Sherlyn Morin  : 1975  MRN: [id number]  Referring provider: Arun Santa MD  Dx:   Encounter Diagnosis     ICD-10-CM    1  Closed fracture of right ankle with routine healing, subsequent encounter  Q58 [id number]                      Subjective: Pt reports her ankle feels good today  She arrived 10 mins late to session  Objective: See treatment diary below      Assessment: Tolerated treatment well  She reports struggling with step downs, height was decreased to 2\" today  Good effort noted with all exercises  Minimal cueing needed for technique with exercises at times  Patient demonstrated fatigue post treatment, exhibited good technique with therapeutic exercises and would benefit from continued PT      Plan: Continue per plan of care  Precautions: HTN, ORIF on     POC expires Auth Status Unit limit Start date  Expiration date PT/OT + Visit Limit?    8/10 N/A N/A 23 BOMN             FOTO last done on 6/15       Manuals 5/15 5/23 6/1 6/5 6/8 6/15 6/19 6/22     PROM R ankle LH JOANN CD KT KT JOANN KT      Grade I/II post mobs LH            Gr  I joint distraction LH             subtalar mobilizations  JOANN           Neuro Re-Ed 5/15            Pt education  4'    4'       BAPS board ant/post, inv/ev             Tandem walking  2x at bar 2x at bar 2x at bar 3x at bar 3x at bar 3x at bar      y-balance  2x5 2x5 2x5 cone cue 2x5 cone cue 2x5 2x5      Great toe Ext             Toe Yoga                           Ther Ex 5/15            Standing heel raises 2x10 2x10 2x10 2x10 2x10 2x10 2x10      Alphabet 2x 2x 2x 2x 2x        Long sitting calf stretch 5x20\"  20\" 5x 20\"x3 standing 20\"x5 20\"x5 20\"x5      Ankle 4-way  TBand GTB 20x ea            Seated HR/TR             ProStretch  5x 20\" 20\" 5x  20\" 5x  20\" 5x  20\"x5 20\"x5      Mini static lunge on foam             Nustep for LE ROM 5' w/ UEs 5' Recembent bike L1 5 min 5' 5' 6' 6'    " "  Ther Activity 5/15            Biodex motor control  2' lateral 2' fwd/bwd lvl 4 2' lateral  2' Fwd/Bwd  lvl 4 2' lateral  2' Fwd/Bwd  lvl 4 2' lateral  2' Fwd/Bwd  lvl 4 3x lvl 6 3x lvl 6      Mini squats 2x10 2x10 2x10 2x10 2x10 2x10 2x10      Step ups  2x10 6\" 6\" step 2x10 6\" step 2x10 6\" step 2x10 2x10 6\" 2x10 6\"      Step downs    nv 2\" 1x5 2x10 4\" 2x10 2\"      Gait Training             Ambulation in lace up brace                               Modalities               CP                                    "

## 2023-06-26 ENCOUNTER — OFFICE VISIT (OUTPATIENT)
Dept: PHYSICAL THERAPY | Facility: CLINIC | Age: 48
End: 2023-06-26
Payer: OTHER MISCELLANEOUS

## 2023-06-26 DIAGNOSIS — S82.891D CLOSED FRACTURE OF RIGHT ANKLE WITH ROUTINE HEALING, SUBSEQUENT ENCOUNTER: Primary | ICD-10-CM

## 2023-06-26 PROCEDURE — 97140 MANUAL THERAPY 1/> REGIONS: CPT

## 2023-06-26 PROCEDURE — 97110 THERAPEUTIC EXERCISES: CPT

## 2023-06-26 PROCEDURE — 97112 NEUROMUSCULAR REEDUCATION: CPT

## 2023-06-26 NOTE — PROGRESS NOTES
"Daily Note     Today's date: 2023  Patient name: Mira Bragg  : 1975  MRN: [id number]  Referring provider: Marine Hwang MD  Dx:   Encounter Diagnosis     ICD-10-CM    1  Closed fracture of right ankle with routine healing, subsequent encounter  V81 508H                      Subjective: Pt reports some stiffness remains in her ankle  Objective: See treatment diary below      Assessment: Tolerated treatment well  Good effort noted with all exercises  Cues needed for proper technique with step downs and Y balance  She reports fatigue Patient demonstrated fatigue post treatment, exhibited good technique with therapeutic exercises and would benefit from continued PT      Plan: Continue per plan of care  Precautions: HTN, ORIF on     POC expires Auth Status Unit limit Start date  Expiration date PT/OT + Visit Limit?    8/10 N/A N/A 23 BOMN             FOTO last done on 6/15       Manuals 5/15 5/23 6/1 6/5 6/8 6/15 6/19 6/22 6/26    PROM R ankle LH JOANN CD KT KT JOANN KT KT KT    Grade I/II post mobs LH            Gr  I joint distraction LH             subtalar mobilizations  JOANN           Neuro Re-Ed 5/15            Pt education  4'    4'       BAPS board ant/post, inv/ev             Tandem walking  2x at bar 2x at bar 2x at bar 3x at bar 3x at bar 3x at bar 3x at bar 3x at bar    y-balance  2x5 2x5 2x5 cone cue 2x5 cone cue 2x5 2x5 2x5 2x5    Great toe Ext             Toe Yoga                           Ther Ex 5/15            Standing heel raises 2x10 2x10 2x10 2x10 2x10 2x10 2x10 2x10 2x10    Alphabet 2x 2x 2x 2x 2x        Long sitting calf stretch 5x20\"  20\" 5x 20\"x3 standing 20\"x5 20\"x5 20\"x5 20\"x5 20\"x5    Ankle 4-way  TBand GTB 20x ea            Seated HR/TR             ProStretch  5x 20\" 20\" 5x  20\" 5x  20\" 5x  20\"x5 20\"x5 20\"x5 20\"x5    Mini static lunge on foam         2x10    Nustep for LE ROM 5' w/ UEs 5' Recembent bike L1 5 min 5' 5' 6' 6' 6' 6'    Ther " "Activity 5/15            Biodex motor control  2' lateral 2' fwd/bwd lvl 4 2' lateral  2' Fwd/Bwd  lvl 4 2' lateral  2' Fwd/Bwd  lvl 4 2' lateral  2' Fwd/Bwd  lvl 4 3x lvl 6 3x lvl 6 3x lvl 6 3x lvl 6    Mini squats 2x10 2x10 2x10 2x10 2x10 2x10 2x10 2x10 2x10    Step ups  2x10 6\" 6\" step 2x10 6\" step 2x10 6\" step 2x10 2x10 6\" 2x10 6\" 2x10 6\" 2x10 6\"    Step downs    nv 2\" 1x5 2x10 4\" 2x10 2\" 2x10 2\" 2x10 2\"    Gait Training             Ambulation in lace up brace                               Modalities               CP                                    "

## 2023-06-29 ENCOUNTER — OFFICE VISIT (OUTPATIENT)
Dept: PHYSICAL THERAPY | Facility: CLINIC | Age: 48
End: 2023-06-29
Payer: OTHER MISCELLANEOUS

## 2023-06-29 DIAGNOSIS — S82.891D CLOSED FRACTURE OF RIGHT ANKLE WITH ROUTINE HEALING, SUBSEQUENT ENCOUNTER: Primary | ICD-10-CM

## 2023-06-29 PROCEDURE — 97530 THERAPEUTIC ACTIVITIES: CPT

## 2023-06-29 PROCEDURE — 97140 MANUAL THERAPY 1/> REGIONS: CPT

## 2023-06-29 PROCEDURE — 97112 NEUROMUSCULAR REEDUCATION: CPT

## 2023-06-29 PROCEDURE — 97110 THERAPEUTIC EXERCISES: CPT

## 2023-06-29 NOTE — PROGRESS NOTES
"Daily Note     Today's date: 2023  Patient name: Theressa Dubin  : 1975  MRN: [id number]  Referring provider: Teo Spring MD  Dx:   Encounter Diagnosis     ICD-10-CM    1  Closed fracture of right ankle with routine healing, subsequent encounter  S82 891D           Start Time:   Stop Time:   Total time in clinic (min): 40 minutes    Subjective: Pt reports continued steady progress with her R ankle  Objective: See treatment diary below      Assessment: Tolerated treatment well  Patient demonstrated fatigue post treatment, exhibited good technique with therapeutic exercises and would benefit from continued PT  Pt able to initiate standing toe raises, pt limited with motion due to stiffness and weakness  Re-initiated ankle joint mobs to promote improved ROM and decreased stiffness  Pt tolerated this well, notes slight soreness at end of session  Plan: Continue per plan of care  Precautions: HTN, ORIF on     POC expires Auth Status Unit limit Start date  Expiration date PT/OT + Visit Limit? 8/10 N/A N/A 23 BOMN             FOTO last done on 6/15       Manuals 5/15 5/23 6/1 6/5 6/8 6/15 6/19 6/22 6/26 6/29   PROM R ankle LH JOANN CD KT KT JOANN KT KT     Grade I/II post mobs LH         MM   Gr   I joint distraction LH         MM    subtalar mobilizations  JOANN        MM   Neuro Re-Ed 5/15            Pt education  4'    4'       BAPS board ant/post, inv/ev             Tandem walking  2x at bar 2x at bar 2x at bar 3x at bar 3x at bar 3x at bar 3x at bar 3x at bar 3x at bar   y-balance  2x5 2x5 2x5 cone cue 2x5 cone cue 2x5 2x5 2x5 2x5 2x5   Great toe Ext             Toe Yoga                           Ther Ex 5/15            Standing heel raises 2x10 2x10 2x10 2x10 2x10 2x10 2x10 2x10 2x10 2x10   Standing toe raises          1x10   Alphabet 2x 2x 2x 2x 2x        Long sitting calf stretch 5x20\"  20\" 5x 20\"x3 standing 20\"x5 20\"x5 20\"x5 20\"x5 20\"x5    Ankle " "4-way  TBand GTB 20x ea            ProStretch  5x 20\" 20\" 5x  20\" 5x  20\" 5x  20\"x5 20\"x5 20\"x5 20\"x5 20\"x5   Mini static lunge on foam         2x10 2x10   Nustep for LE ROM 5' w/ UEs 5' Recembent bike L1 5 min 5' 5' 6' 6' 6' 6' 6'   Ther Activity 5/15            Biodex motor control  2' lateral 2' fwd/bwd lvl 4 2' lateral  2' Fwd/Bwd  lvl 4 2' lateral  2' Fwd/Bwd  lvl 4 2' lateral  2' Fwd/Bwd  lvl 4 3x lvl 6 3x lvl 6 3x lvl 6 3x lvl 6 3x lvl 6   Mini squats 2x10 2x10 2x10 2x10 2x10 2x10 2x10 2x10 2x10 2x10   Step ups  2x10 6\" 6\" step 2x10 6\" step 2x10 6\" step 2x10 2x10 6\" 2x10 6\" 2x10 6\" 2x10 6\" 2x10 6\"   Step downs    nv 2\" 1x5 2x10 4\" 2x10 2\" 2x10 2\" 2x10 2\" 2x10 2\"   Gait Training             Ambulation in lace up brace                               Modalities               CP                                    "

## 2023-07-03 ENCOUNTER — APPOINTMENT (OUTPATIENT)
Dept: PHYSICAL THERAPY | Facility: CLINIC | Age: 48
End: 2023-07-03
Payer: OTHER MISCELLANEOUS

## 2023-07-06 ENCOUNTER — APPOINTMENT (OUTPATIENT)
Dept: PHYSICAL THERAPY | Facility: CLINIC | Age: 48
End: 2023-07-06
Payer: OTHER MISCELLANEOUS

## 2023-07-06 NOTE — PROGRESS NOTES
Daily Note     Today's date: 2023  Patient name: Juany Kyle  : 1975  MRN: [id number]  Referring provider: Vicente Hughes MD  Dx:   Encounter Diagnosis     ICD-10-CM    1. Closed fracture of right ankle with routine healing, subsequent encounter  S82.891D                      Subjective: ***      Objective: See treatment diary below      Assessment: Tolerated treatment well. Patient demonstrated fatigue post treatment, exhibited good technique with therapeutic exercises and would benefit from continued PT      Plan: Continue per plan of care. Precautions: HTN, ORIF on     POC expires Auth Status Unit limit Start date  Expiration date PT/OT + Visit Limit? 8/10 N/A N/A 23 BOMN             FOTO last done on 6/15       Manuals 7/6   6/5 6/8 6/15 6/19 6/22 6/26 6/29   PROM R ankle KT   KT KT JOANN KT KT     Grade I/II post mobs          MM   Gr.  I joint distraction          MM    subtalar mobilizations          MM   Neuro Re-Ed             Pt education      4'       BAPS board ant/post, inv/ev             Tandem walking 3x at bar   2x at bar 3x at bar 3x at bar 3x at bar 3x at bar 3x at bar 3x at bar   y-balance 2x5   2x5 cone cue 2x5 cone cue 2x5 2x5 2x5 2x5 2x5   Great toe Ext             Toe Yoga                           Ther Ex             Standing heel raises 2x10   2x10 2x10 2x10 2x10 2x10 2x10 2x10   Standing toe raises 1x10         1x10   Alphabet    2x 2x        Long sitting calf stretch    20"x3 standing 20"x5 20"x5 20"x5 20"x5 20"x5    Ankle 4-way  TBand             ProStretch 20"x5   20" 5x  20" 5x  20"x5 20"x5 20"x5 20"x5 20"x5   Mini static lunge on foam 2x10        2x10 2x10   Nustep for LE ROM 6'   5' 5' 6' 6' 6' 6' 6'   Ther Activity             Biodex motor control 3x lvl 6   2' lateral  2' Fwd/Bwd  lvl 4 2' lateral  2' Fwd/Bwd  lvl 4 3x lvl 6 3x lvl 6 3x lvl 6 3x lvl 6 3x lvl 6   Mini squats 2x10   2x10 2x10 2x10 2x10 2x10 2x10 2x10   Step ups 2x10 6" 6" step 2x10 6" step 2x10 2x10 6" 2x10 6" 2x10 6" 2x10 6" 2x10 6"   Step downs 2x10 2"   nv 2" 1x5 2x10 4" 2x10 2" 2x10 2" 2x10 2" 2x10 2"   Gait Training             Ambulation in lace up brace                               Modalities               CP

## 2023-07-10 ENCOUNTER — OFFICE VISIT (OUTPATIENT)
Dept: PHYSICAL THERAPY | Facility: CLINIC | Age: 48
End: 2023-07-10
Payer: OTHER MISCELLANEOUS

## 2023-07-10 DIAGNOSIS — S82.891D CLOSED FRACTURE OF RIGHT ANKLE WITH ROUTINE HEALING, SUBSEQUENT ENCOUNTER: Primary | ICD-10-CM

## 2023-07-10 PROCEDURE — 97140 MANUAL THERAPY 1/> REGIONS: CPT

## 2023-07-10 PROCEDURE — 97530 THERAPEUTIC ACTIVITIES: CPT

## 2023-07-10 PROCEDURE — 97112 NEUROMUSCULAR REEDUCATION: CPT

## 2023-07-10 PROCEDURE — 97110 THERAPEUTIC EXERCISES: CPT

## 2023-07-10 NOTE — PROGRESS NOTES
Daily Note     Today's date: 7/10/2023  Patient name: Aaksh Cardoza  : 1975  MRN: [id number]  Referring provider: Isiah Carter MD  Dx:   Encounter Diagnosis     ICD-10-CM    1. Closed fracture of right ankle with routine healing, subsequent encounter  S82.891D                      Subjective: Pt reports she had more pain last week which is why she cancelled. She has been trying to do more walking/other activities. Objective: See treatment diary below      Assessment: Tolerated treatment well. Increased reps for toe raises today. Good effort noted throughout session. Sensitivity noted with MWM with theraist hand placement. Min cueing needed for technique with step downs and Y balance. Limited DF ROM persists. Patient demonstrated fatigue post treatment, exhibited good technique with therapeutic exercises and would benefit from continued PT      Plan: Continue per plan of care. Precautions: HTN, ORIF on     POC expires Auth Status Unit limit Start date  Expiration date PT/OT + Visit Limit? 8/10 N/A N/A 23 BOMN             FOTO last done on 6/15       Manuals 7/6 7/10  6/5 6/8 6/15 6/19 6/22 6/26 6/29   PROM R ankle KT KT  KT KT JOANN KT KT     Grade I/II post mobs          MM   Gr.  I joint distraction          MM    subtalar mobilizations          MM   DF MWM  8" 10x 5"           Neuro Re-Ed             Pt education      4'       BAPS board ant/post, inv/ev             Tandem walking 3x at bar 3x at bar  2x at bar 3x at bar 3x at bar 3x at bar 3x at bar 3x at bar 3x at bar   y-balance 2x5 2x5  2x5 cone cue 2x5 cone cue 2x5 2x5 2x5 2x5 2x5   Great toe Ext             Toe Yoga                           Ther Ex             Standing heel raises 2x10 2x10  2x10 2x10 2x10 2x10 2x10 2x10 2x10   Standing toe raises 1x10 2x10        1x10   Alphabet    2x 2x        Long sitting calf stretch    20"x3 standing 20"x5 20"x5 20"x5 20"x5 20"x5    Ankle 4-way  TBand ProStretch 20"x5 20"x5  20" 5x  20" 5x  20"x5 20"x5 20"x5 20"x5 20"x5   Mini static lunge on foam 2x10 2x10       2x10 2x10   Nustep for LE ROM 6' 6'  5' 5' 6' 6' 6' 6' 6'   Ther Activity             Biodex motor control 3x lvl 6 3x lvl 6  2' lateral  2' Fwd/Bwd  lvl 4 2' lateral  2' Fwd/Bwd  lvl 4 3x lvl 6 3x lvl 6 3x lvl 6 3x lvl 6 3x lvl 6   Mini squats 2x10 2x10  2x10 2x10 2x10 2x10 2x10 2x10 2x10   Step ups 2x10 6" 2x10 6"  6" step 2x10 6" step 2x10 2x10 6" 2x10 6" 2x10 6" 2x10 6" 2x10 6"   Step downs 2x10 2" 2x10 2"  nv 2" 1x5 2x10 4" 2x10 2" 2x10 2" 2x10 2" 2x10 2"   Gait Training             Ambulation in lace up brace                               Modalities               CP

## 2023-07-13 ENCOUNTER — APPOINTMENT (OUTPATIENT)
Dept: PHYSICAL THERAPY | Facility: CLINIC | Age: 48
End: 2023-07-13
Payer: OTHER MISCELLANEOUS

## 2023-07-17 ENCOUNTER — APPOINTMENT (OUTPATIENT)
Dept: PHYSICAL THERAPY | Facility: CLINIC | Age: 48
End: 2023-07-17
Payer: OTHER MISCELLANEOUS

## 2023-07-18 ENCOUNTER — OFFICE VISIT (OUTPATIENT)
Dept: PHYSICAL THERAPY | Facility: CLINIC | Age: 48
End: 2023-07-18
Payer: OTHER MISCELLANEOUS

## 2023-07-18 DIAGNOSIS — S82.891D CLOSED FRACTURE OF RIGHT ANKLE WITH ROUTINE HEALING, SUBSEQUENT ENCOUNTER: Primary | ICD-10-CM

## 2023-07-18 PROCEDURE — 97530 THERAPEUTIC ACTIVITIES: CPT | Performed by: PHYSICAL THERAPIST

## 2023-07-18 PROCEDURE — 97110 THERAPEUTIC EXERCISES: CPT | Performed by: PHYSICAL THERAPIST

## 2023-07-18 PROCEDURE — 97112 NEUROMUSCULAR REEDUCATION: CPT | Performed by: PHYSICAL THERAPIST

## 2023-07-18 NOTE — PROGRESS NOTES
Daily Note     Today's date: 2023  Patient name: Skinny Greene  : 1975  MRN: [id number]  Referring provider: Ev Kevin MD  Dx:   Encounter Diagnosis     ICD-10-CM    1. Closed fracture of right ankle with routine healing, subsequent encounter  S82.891D           Start Time: 0845  Stop Time: 0932  Total time in clinic (min): 47 minutes    Subjective: Pt reports she continues to have improved motion of her R ankle. Objective: See treatment diary below      Assessment: Tolerated treatment well. Patient demonstrated fatigue post treatment, exhibited good technique with therapeutic exercises and would benefit from continued PT. Pt was able to progress today with inclusion of wobble board into pt's exercise program. Pt required min verbal cues to facilitate performance of proper technique. Assess pt response to treatment at next visit. Plan: Continue per plan of care. Precautions: HTN, ORIF on     POC expires Auth Status Unit limit Start date  Expiration date PT/OT + Visit Limit?    8/10 N/A N/A 23 BOMN             FOTO last done on 6/15       Manuals 7/6 7/10 7/18          PROM R ankle KT KT JOANN          Grade I/II post mobs             Gr. I joint distraction              subtalar mobilizations             DF MWM  8" 10x 5" 8" 10x 5"          Neuro Re-Ed             Pt education   4'          BAPS board ant/post, inv/ev             Tandem walking 3x at bar 3x at bar 3x at bar          y-balance 2x5 2x5 NV          Great toe Ext             Toe Yoga              wobble board   20x fwd/bwd, lat          Ther Ex             Standing heel raises 2x10 2x10 2x10          Standing toe raises 1x10 2x10 2x10          Alphabet             Long sitting calf stretch             Ankle 4-way  TBand             ProStretch 20"x5 20"x5 20"x5          Mini static lunge on foam 2x10 2x10 2x10          Nustep for LE ROM 6' 6' 6'          Ther Activity             Biodex motor control 3x lvl 6 3x lvl 6 3x lvl 6          Mini squats 2x10 2x10 2x10          Step ups 2x10 6" 2x10 6" 2x10 6"          Step downs 2x10 2" 2x10 2" 2x10 2"          Gait Training             Ambulation in lace up brace                           Modalities             CP

## 2023-07-20 ENCOUNTER — OFFICE VISIT (OUTPATIENT)
Dept: PHYSICAL THERAPY | Facility: CLINIC | Age: 48
End: 2023-07-20
Payer: OTHER MISCELLANEOUS

## 2023-07-20 DIAGNOSIS — S82.891D CLOSED FRACTURE OF RIGHT ANKLE WITH ROUTINE HEALING, SUBSEQUENT ENCOUNTER: Primary | ICD-10-CM

## 2023-07-20 PROCEDURE — 97112 NEUROMUSCULAR REEDUCATION: CPT

## 2023-07-20 PROCEDURE — 97530 THERAPEUTIC ACTIVITIES: CPT

## 2023-07-20 PROCEDURE — 97110 THERAPEUTIC EXERCISES: CPT

## 2023-07-20 NOTE — PROGRESS NOTES
Daily Note     Today's date: 2023  Patient name: Nicky Macedo  : 1975  MRN: [id number]  Referring provider: Moisés Underwood MD  Dx:   Encounter Diagnosis     ICD-10-CM    1. Closed fracture of right ankle with routine healing, subsequent encounter  S82.891D                      Subjective: Pt reports some discomfort in the back part of her ankle. Objective: See treatment diary below      Assessment: Tolerated treatment well. DF MWM remains uncomfortable for pt. Good effort noted with all exercises. She reports step downs continue to be very challenging. Min cues needed for correct exercise performance and technique. Increased level on biodex this session. Patient demonstrated fatigue post treatment, exhibited good technique with therapeutic exercises and would benefit from continued PT      Plan: Continue per plan of care. Precautions: HTN, ORIF on     POC expires Auth Status Unit limit Start date  Expiration date PT/OT + Visit Limit?    8/10 N/A N/A 23 BOMN             FOTO last done on 6/15       Manuals 7/6 7/10 7/18 7/20         PROM R ankle KT KT JOANN KT         Grade I/II post mobs             Gr. I joint distraction              subtalar mobilizations             DF MWM  8" 10x 5" 8" 10x 5" 8" 10x 5"         Neuro Re-Ed             Pt education   4'          BAPS board ant/post, inv/ev             Tandem walking 3x at bar 3x at bar 3x at bar 3x at bar         y-balance 2x5 2x5 NV 2x5         Great toe Ext             Toe Yoga              wobble board   20x fwd/bwd, lat 20x fwd/bwd, lat         Ther Ex             Standing heel raises 2x10 2x10 2x10 2x10         Standing toe raises 1x10 2x10 2x10 2x10         Alphabet             Long sitting calf stretch             Ankle 4-way  TBand             ProStretch 20"x5 20"x5 20"x5 20"x5         Mini static lunge on foam 2x10 2x10 2x10 2x10         Nustep for LE ROM 6' 6' 6' 6'         Ther Activity Biodex motor control 3x lvl 6 3x lvl 6 3x lvl 6 3x lvl 7         Mini squats 2x10 2x10 2x10 2x10         Step ups 2x10 6" 2x10 6" 2x10 6" 2x10 6"         Step downs 2x10 2" 2x10 2" 2x10 2" 2x10 2"         Gait Training             Ambulation in lace up brace                           Modalities             CP

## 2023-07-24 ENCOUNTER — APPOINTMENT (OUTPATIENT)
Dept: PHYSICAL THERAPY | Facility: CLINIC | Age: 48
End: 2023-07-24
Payer: OTHER MISCELLANEOUS

## 2023-07-27 ENCOUNTER — APPOINTMENT (OUTPATIENT)
Dept: PHYSICAL THERAPY | Facility: CLINIC | Age: 48
End: 2023-07-27
Payer: OTHER MISCELLANEOUS

## 2023-07-28 ENCOUNTER — OFFICE VISIT (OUTPATIENT)
Dept: PHYSICAL THERAPY | Facility: CLINIC | Age: 48
End: 2023-07-28
Payer: OTHER MISCELLANEOUS

## 2023-07-28 DIAGNOSIS — S82.891D CLOSED FRACTURE OF RIGHT ANKLE WITH ROUTINE HEALING, SUBSEQUENT ENCOUNTER: Primary | ICD-10-CM

## 2023-07-28 DIAGNOSIS — Z48.89 AFTERCARE FOLLOWING SURGERY: Primary | ICD-10-CM

## 2023-07-28 PROCEDURE — 97112 NEUROMUSCULAR REEDUCATION: CPT

## 2023-07-28 PROCEDURE — 97110 THERAPEUTIC EXERCISES: CPT

## 2023-07-28 PROCEDURE — 97530 THERAPEUTIC ACTIVITIES: CPT

## 2023-07-28 NOTE — PROGRESS NOTES
Daily Note     Today's date: 2023  Patient name: Cady Bosch  : 1975  MRN: [id number]  Referring provider: Shree Mckeon MD  Dx:   Encounter Diagnosis     ICD-10-CM    1. Closed fracture of right ankle with routine healing, subsequent encounter  S82.891D                      Subjective: Pt reports her scar was sensitive and slightly swollen last weekend for 2 days, it was significantly painful and she couldn't touch it. It was the top 1.5 inches of her lateral ankle scar. Objective: See treatment diary below      Assessment: Tolerated treatment well. No signs or symptoms of infection present. No redness or swelling present today, mild TTP at the proximal end of her scar. She was encouraged to call her MD if this pain returns. She tolerates all other exercises without complaints. Cues needed for less WB on her L LE with heel taps and Y balance. Patient demonstrated fatigue post treatment, exhibited good technique with therapeutic exercises and would benefit from continued PT      Plan: Continue per plan of care. Precautions: HTN, ORIF on     POC expires Auth Status Unit limit Start date  Expiration date PT/OT + Visit Limit?    8/10 N/A N/A 23 BOMN             FOTO last done on 6/15       Manuals 7/6 7/10 7/18 7/20 7/28        PROM R ankle KT KT JOANN KT KT        Grade I/II post mobs             Gr. I joint distraction              subtalar mobilizations             DF MWM  8" 10x 5" 8" 10x 5" 8" 10x 5" 8" 10x 5"        Neuro Re-Ed             Pt education   4'          BAPS board ant/post, inv/ev             Tandem walking 3x at bar 3x at bar 3x at bar 3x at bar 3x at bar        y-balance 2x5 2x5 NV 2x5 2x5        Great toe Ext             Toe Yoga              wobble board   20x fwd/bwd, lat 20x fwd/bwd, lat 20x fwd/bwd, lat        Ther Ex             Standing heel raises 2x10 2x10 2x10 2x10 2x10        Standing toe raises 1x10 2x10 2x10 2x10 2x10 Alphabet             Long sitting calf stretch             Ankle 4-way  TBand             ProStretch 20"x5 20"x5 20"x5 20"x5 20"x5        Mini static lunge on foam 2x10 2x10 2x10 2x10 2x10        Nustep for LE ROM 6' 6' 6' 6' 6'        Ther Activity             Biodex motor control 3x lvl 6 3x lvl 6 3x lvl 6 3x lvl 7 3x lvl 7        Mini squats 2x10 2x10 2x10 2x10 2x10        Step ups 2x10 6" 2x10 6" 2x10 6" 2x10 6" 2x10 6"        Step downs 2x10 2" 2x10 2" 2x10 2" 2x10 2" 2x10 2"        Gait Training             Ambulation in lace up brace                           Modalities             CP

## 2023-07-31 ENCOUNTER — OFFICE VISIT (OUTPATIENT)
Dept: PHYSICAL THERAPY | Facility: CLINIC | Age: 48
End: 2023-07-31
Payer: OTHER MISCELLANEOUS

## 2023-07-31 DIAGNOSIS — S82.891D CLOSED FRACTURE OF RIGHT ANKLE WITH ROUTINE HEALING, SUBSEQUENT ENCOUNTER: Primary | ICD-10-CM

## 2023-07-31 PROCEDURE — 97530 THERAPEUTIC ACTIVITIES: CPT

## 2023-07-31 PROCEDURE — 97140 MANUAL THERAPY 1/> REGIONS: CPT

## 2023-07-31 PROCEDURE — 97110 THERAPEUTIC EXERCISES: CPT

## 2023-07-31 PROCEDURE — 97112 NEUROMUSCULAR REEDUCATION: CPT

## 2023-07-31 NOTE — PROGRESS NOTES
Daily Note     Today's date: 2023  Patient name: Taylor Peña  : 1975  MRN: [id number]  Referring provider: Jalyn Venegas MD  Dx:   Encounter Diagnosis     ICD-10-CM    1. Closed fracture of right ankle with routine healing, subsequent encounter  S82.891D                      Subjective: Pt reports the top part of her incision is hurting today and she feels a bump, she sees her doctor tomorrow. Objective: See treatment diary below      Assessment: Tolerated treatment well. She reports another episode of tenderness and swelling at the proximal end of her incision. No complaints of increased pain with exercises as outlined below. Patient demonstrated fatigue post treatment, exhibited good technique with therapeutic exercises and would benefit from continued PT      Plan: Continue per plan of care. Precautions: HTN, ORIF on     POC expires Auth Status Unit limit Start date  Expiration date PT/OT + Visit Limit?    8/10 N/A N/A 23 BOMN             FOTO last done on 6/15       Manuals 7/6 7/10 7/18 7/20 7/28        PROM R ankle KT KT JOANN KT KT        Grade I/II post mobs             Gr. I joint distraction              subtalar mobilizations             DF MWM  8" 10x 5" 8" 10x 5" 8" 10x 5" 8" 10x 5"        Neuro Re-Ed             Pt education   4'          BAPS board ant/post, inv/ev             Tandem walking 3x at bar 3x at bar 3x at bar 3x at bar 3x at bar        y-balance 2x5 2x5 NV 2x5 2x5        Great toe Ext             Toe Yoga              wobble board   20x fwd/bwd, lat 20x fwd/bwd, lat 20x fwd/bwd, lat        Ther Ex             Standing heel raises 2x10 2x10 2x10 2x10 2x10        Standing toe raises 1x10 2x10 2x10 2x10 2x10        Alphabet             Long sitting calf stretch             Ankle 4-way  TBand             ProStretch 20"x5 20"x5 20"x5 20"x5 20"x5        Mini static lunge on foam 2x10 2x10 2x10 2x10 2x10        Nustep for LE ROM 6' 6' 6' 6' 6' Ther Activity             Biodex motor control 3x lvl 6 3x lvl 6 3x lvl 6 3x lvl 7 3x lvl 7        Mini squats 2x10 2x10 2x10 2x10 2x10        Step ups 2x10 6" 2x10 6" 2x10 6" 2x10 6" 2x10 6"        Step downs 2x10 2" 2x10 2" 2x10 2" 2x10 2" 2x10 2"        Gait Training             Ambulation in lace up brace                           Modalities             CP

## 2023-08-01 ENCOUNTER — OFFICE VISIT (OUTPATIENT)
Dept: OBGYN CLINIC | Facility: CLINIC | Age: 48
End: 2023-08-01
Payer: COMMERCIAL

## 2023-08-01 ENCOUNTER — APPOINTMENT (OUTPATIENT)
Dept: RADIOLOGY | Facility: CLINIC | Age: 48
End: 2023-08-01
Payer: COMMERCIAL

## 2023-08-01 VITALS
DIASTOLIC BLOOD PRESSURE: 104 MMHG | HEIGHT: 64 IN | HEART RATE: 86 BPM | WEIGHT: 231.2 LBS | BODY MASS INDEX: 39.47 KG/M2 | SYSTOLIC BLOOD PRESSURE: 160 MMHG

## 2023-08-01 DIAGNOSIS — S82.841D CLOSED BIMALLEOLAR FRACTURE OF RIGHT ANKLE WITH ROUTINE HEALING, SUBSEQUENT ENCOUNTER: Primary | ICD-10-CM

## 2023-08-01 DIAGNOSIS — Z48.89 AFTERCARE FOLLOWING SURGERY: ICD-10-CM

## 2023-08-01 PROCEDURE — 99213 OFFICE O/P EST LOW 20 MIN: CPT | Performed by: ORTHOPAEDIC SURGERY

## 2023-08-01 PROCEDURE — 73610 X-RAY EXAM OF ANKLE: CPT

## 2023-08-01 NOTE — PROGRESS NOTES
Orthopaedics Office Visit - Follow-up Patient Visit    ASSESSMENT/PLAN:    Assessment:   · ORIF right ankle bimalleolar equivalent fracture   · DOS 12/29/2022    Sensitivity over proximal aspect of incision - possible muscle herniation through fascia  Unlikely neuroma - sensation full in foot  Possible hypersensitive scar    Plan:   · Reviewed physical exam and imaging with patient at time of visit  · She may discontinue formal physical therapy at this time. Continue home exercise program to maintain strength and motion. · Monitor swelling and tenderness over incision, if symptoms persist or worsen, she was instructed to call the office for re-evaluation. · She will follow-up in 6 months for re-evaluation and repeat x-rays. May consider advanced imaging of sensitive area if no improvement    To Do Next Visit:  · Follow-up in 6 months for re-evaluation and 3+vw xr of right ankle    _____________________________________________________  CHIEF COMPLAINT:  Chief Complaint   Patient presents with   • Right Ankle - Follow-up         SUBJECTIVE:  Jean Paul Holland is a 49 y.o. female who presents for follow-up evaluation of her right ankle. She is approximately 7 months s/p ORIF bimalleolar equivalent fracture of her right ankle, completed on 12/29/22. On today's presentation, she reports significant improvements since her last visit. She reports mild swelling at the superior aspect of the incision that occurs occasionally. She states that the pocket of swelling is painful to the touch. She continues to complete physical therapy for strength and motion. She denies numbness, tingling, weakness, or feelings of instability.       PAST MEDICAL HISTORY:  Past Medical History:   Diagnosis Date   • Allergic    • GERD (gastroesophageal reflux disease)    • Hypertension    • Sleep apnea     does not use cpap "yet"   • Sleep apnea, obstructive    • Varicella        PAST SURGICAL HISTORY:  Past Surgical History:   Procedure Laterality Date   •  SECTION      x2    • [place] OPEN TX DISTAL FIBULAR FRACTURE LAT MALLEOLUS Right 2022    Procedure: OPEN REDUCTION W/ INTERNAL FIXATION (ORIF) RIGHT ANKLE BIMALL EQUIV FRACTURE;  Surgeon: Minnie Pacheco MD;  Location: [facility];  Service: Orthopedics       FAMILY HISTORY:  Family History   Problem Relation Age of Onset   • Esophageal cancer Mother    • Heart disease Father    • No Known Problems Sister    • No Known Problems Daughter    • No Known Problems Maternal Grandmother    • No Known Problems Maternal Grandfather    • No Known Problems Paternal Grandmother    • No Known Problems Paternal Grandfather    • No Known Problems Sister    • No Known Problems Brother    • No Known Problems Brother    • No Known Problems Son    • No Known Problems Maternal Aunt    • No Known Problems Paternal Aunt    • No Known Problems Paternal Aunt    • No Known Problems Paternal Aunt    • No Known Problems Paternal Aunt    • No Known Problems Paternal Aunt        SOCIAL HISTORY:  Social History     Tobacco Use   • Smoking status: Never   • Smokeless tobacco: Never   Vaping Use   • Vaping Use: Never used   Substance Use Topics   • Alcohol use: Not Currently   • Drug use: Never       MEDICATIONS:    Current Outpatient Medications:   •  Diclofenac Sodium (VOLTAREN) 1 %, Apply 2 g topically 4 (four) times a day, Disp: 100 g, Rfl: 0  •  Iron-Vitamin C (Vitron-C)  MG TABS, Take 1 tablet by mouth 2 (two) times a day, Disp: 60 tablet, Rfl: 3  •  levothyroxine (Levo-T) 75 mcg tablet, Take 1 tablet (75 mcg total) by mouth daily in the early morning, Disp: 30 tablet, Rfl: 5  •  multivitamin (THERAGRAN) TABS, Take 1 tablet by mouth daily, Disp: , Rfl:   •  sertraline (ZOLOFT) 50 mg tablet, Take 1 tablet (50 mg total) by mouth daily, Disp: 90 tablet, Rfl: 5  •  VITAMIN D PO, Take by mouth, Disp: , Rfl:   •  aspirin (ECOTRIN LOW STRENGTH) 81 mg EC tablet, Take 1 tablet (81 mg total) by mouth every 12 (twelve) hours, Disp: 84 tablet, Rfl: 0    ALLERGIES:  No Known Allergies    REVIEW OF SYSTEMS:  MSK: As noted in HPI  Neuro: Negative. Pertinent items are otherwise noted in HPI. A comprehensive review of systems was otherwise negative. LABS:  HgA1c: No results found for: "HGBA1C"  BMP:   Lab Results   Component Value Date    CALCIUM 8.9 05/03/2023    K 4.3 05/03/2023    CO2 28 05/03/2023     (H) 05/03/2023    BUN 8 05/03/2023    CREATININE 0.95 05/03/2023     CBC: No components found for: "CBC"    _____________________________________________________  PHYSICAL EXAMINATION:  Vital signs: BP (!) 160/104   Pulse 86   Ht 5' 4" (1.626 m)   Wt 105 kg (231 lb 3.2 oz)   BMI 39.69 kg/m²   General: No acute distress, awake and alert  Psychiatric: Mood and affect appear appropriate  HEENT: Trachea Midline, No torticollis, no apparent facial trauma  Cardiovascular: No audible murmurs; Extremities appear perfused  Pulmonary: No audible wheezing or stridor  Skin: No open lesions; see further details (if any) below    MUSCULOSKELETAL EXAMINATION:  Extremities:    right ankle -   Patient ambulates with steady gait pattern  Uses No assistive device  No anatomical deformity  Skin is warm and dry to touch with no signs of erythema, ecchymosis, infection  Mild generalized soft tissue swelling or effusion noted over superior aspect of incision, sensitive to touch  ROM DF: 0-15, PF 0-40  TTP over superior aspect of incision  MMT: 5/5 throughout  Calf compartments are soft and supple  2+ TP and DP pulses with brisk capillary refill to the toes  Sural, saphenous, tibial, superficial, and deep peroneal motor and sensory distributions intact  Sensation to light touch intact distally        _____________________________________________________  STUDIES REVIEWED:  I personally reviewed the images and interpretation is as follows:    X-Ray of right ankle taken on 8/1/23 were reviewed and showed healing ORIF fracture.  There is increased bridging bone across fracture site with maintained anatomical alignment. PROCEDURES PERFORMED:  Procedures   None performed.      Scribe Attestation    I,:  Mari Plascencia am acting as a scribe while in the presence of the attending physician.:       I,:  Darian Ram MD personally performed the services described in this documentation    as scribed in my presence.:

## 2023-08-02 ENCOUNTER — TELEPHONE (OUTPATIENT)
Age: 48
End: 2023-08-02

## 2023-08-02 NOTE — TELEPHONE ENCOUNTER
Caller: Paco Hussein    Doctor: Sally Moreno    Reason for call: Melani Santiago is calling to request a work status letter on patient.  Please fax to [phone]    Call back#: [phone]

## 2023-08-10 ENCOUNTER — OFFICE VISIT (OUTPATIENT)
Dept: PHYSICAL THERAPY | Facility: CLINIC | Age: 48
End: 2023-08-10
Payer: OTHER MISCELLANEOUS

## 2023-08-10 DIAGNOSIS — S82.891D CLOSED FRACTURE OF RIGHT ANKLE WITH ROUTINE HEALING, SUBSEQUENT ENCOUNTER: Primary | ICD-10-CM

## 2023-08-10 PROCEDURE — 97112 NEUROMUSCULAR REEDUCATION: CPT

## 2023-08-10 PROCEDURE — 97110 THERAPEUTIC EXERCISES: CPT

## 2023-08-10 NOTE — PROGRESS NOTES
Daily Note     Today's date: 8/10/2023  Patient name: Flavio Flanagan  : 1975  MRN: [id number]  Referring provider: Lin June MD  Dx:   Encounter Diagnosis     ICD-10-CM    1. Closed fracture of right ankle with routine healing, subsequent encounter  S82.891D                      Subjective: Pt reports the doctor wants her to watch the sensitive part of her scar/ankle, but the doctor says sensitivity is normal.      Objective: See treatment diary below      Assessment: Tolerated treatment well. She demonstrates good recall of current exercise program. Good effort noted with all exercises. No complaints verbalized with exercises. Pt reports her MD states she can complete PT for the month of August. Patient demonstrated fatigue post treatment, exhibited good technique with therapeutic exercises and would benefit from continued PT      Plan: Continue per plan of care. Precautions: HTN, ORIF on     POC expires Auth Status Unit limit Start date  Expiration date PT/OT + Visit Limit?    10 N/A N/A 23 BOMN             FOTO last done on 6/15       Manuals  7/10 7/18 7/20 7/28 810       PROM R ankle KT KT JOANN KT KT KT       Grade I/II post mobs             Gr. I joint distraction              subtalar mobilizations             DF MWM  8" 10x 5" 8" 10x 5" 8" 10x 5" 8" 10x 5" 8" 10x 5"       Neuro Re-Ed             Pt education   4'          BAPS board ant/post, inv/ev             Tandem walking 3x at bar 3x at bar 3x at bar 3x at bar 3x at bar 3x at bar       y-balance 2x5 2x5 NV 2x5 2x5 2x5       Great toe Ext             Toe Yoga              wobble board   20x fwd/bwd, lat 20x fwd/bwd, lat 20x fwd/bwd, lat 20x fwd/bwd, lat       Ther Ex             Standing heel raises 2x10 2x10 2x10 2x10 2x10 2x10       Standing toe raises 1x10 2x10 2x10 2x10 2x10 2x10       Alphabet             Long sitting calf stretch             Ankle 4-way  TBand             ProStretch 20"x5 20"x5 20"x5 20"x5 20"x5 20"x5       Mini static lunge on foam 2x10 2x10 2x10 2x10 2x10 2x10       Nustep for LE ROM 6' 6' 6' 6' 6' 6'       Ther Activity             Biodex motor control 3x lvl 6 3x lvl 6 3x lvl 6 3x lvl 7 3x lvl 7 3x lvl 7       Mini squats 2x10 2x10 2x10 2x10 2x10 2x10       Step ups 2x10 6" 2x10 6" 2x10 6" 2x10 6" 2x10 6" 2x10 6"       Step downs 2x10 2" 2x10 2" 2x10 2" 2x10 2" 2x10 2" 2x10 2"       Gait Training             Ambulation in lace up brace                           Modalities             CP

## 2023-08-14 ENCOUNTER — APPOINTMENT (OUTPATIENT)
Dept: PHYSICAL THERAPY | Facility: CLINIC | Age: 48
End: 2023-08-14
Payer: OTHER MISCELLANEOUS

## 2023-08-16 DIAGNOSIS — F32.A ANXIETY AND DEPRESSION: ICD-10-CM

## 2023-08-16 DIAGNOSIS — F41.9 ANXIETY AND DEPRESSION: ICD-10-CM

## 2023-08-17 ENCOUNTER — OFFICE VISIT (OUTPATIENT)
Dept: PHYSICAL THERAPY | Facility: CLINIC | Age: 48
End: 2023-08-17
Payer: OTHER MISCELLANEOUS

## 2023-08-17 DIAGNOSIS — S82.891D CLOSED FRACTURE OF RIGHT ANKLE WITH ROUTINE HEALING, SUBSEQUENT ENCOUNTER: Primary | ICD-10-CM

## 2023-08-17 PROCEDURE — 97530 THERAPEUTIC ACTIVITIES: CPT | Performed by: PHYSICAL THERAPIST

## 2023-08-17 PROCEDURE — 97112 NEUROMUSCULAR REEDUCATION: CPT | Performed by: PHYSICAL THERAPIST

## 2023-08-17 PROCEDURE — 97110 THERAPEUTIC EXERCISES: CPT | Performed by: PHYSICAL THERAPIST

## 2023-08-17 NOTE — PROGRESS NOTES
PT Re-Evaluation     Today's date: 2023  Patient name: Yvan Cloud  : 1975  MRN: [id number]  Referring provider: Alexandr Kemp MD  Dx:   Encounter Diagnosis     ICD-10-CM    1. Closed fracture of right ankle with routine healing, subsequent encounter  S82.891D           Start Time:   Stop Time: 1802  Total time in clinic (min): 47 minutes    Assessment  Assessment details: Pt is a 50 y/o female presenting to physical therapy s/p ORIF of the R ankle on . Upon re-examination, pt has improved strength and ROM of her R ankle as well as decreased of the same area. Pt's FOTO score has also improved, demonstrating an improved ability to perform functional activities such as ambulation and stair negotiation. However, pt continues to have strength and ROM deficits of her R ankle as well as pain and difficulty with functional activities. Pt plan of care will focus on improving the previously mentioned deficits and limitations. Pt would continue to benefit from skilled physical therapy to facilitate a return to her prior level of function. Impairments: abnormal gait, abnormal or restricted ROM, activity intolerance, impaired physical strength, lacks appropriate home exercise program, pain with function and weight-bearing intolerance  Functional limitations: self-care/ADLs, household activities, ambulation, and stair negotiation  Symptom irritability: high  Goals  STG - 4 weeks  1. Pt will have a subjective decrease in pain of her R ankle by 2 levels or more during during ambulation MET  2. Pt will be able to ambulate with a normalized pattern with least restrictive device to facilitate a return to her prior level of function Ongoing    LTG - 8 weeks  1. Pt will demonstrate WFL AROM of her R ankle in all planes to facilitate a return to her prior level of function Ongoing  2. Pt's FOTO score will improve from 26 to 53 or better to facilitate a return to pt's prior level of function. MET      Plan  Patient would benefit from: PT eval and skilled physical therapy  Planned modality interventions: cryotherapy, thermotherapy: hydrocollator packs and unattended electrical stimulation  Planned therapy interventions: activity modification, ADL training, balance/weight bearing training, behavior modification, flexibility, functional ROM exercises, gait training, graded exercise, home exercise program, joint mobilization, manual therapy, massage, Ford taping, neuromuscular re-education, patient education, self care, strengthening, stretching, therapeutic activities and therapeutic exercise  Frequency: 2x week  Duration in weeks: 8  Plan of Care beginning date: 2023  Plan of Care expiration date: 10/12/2023  Treatment plan discussed with: patient        Subjective Evaluation    History of Present Illness  Mechanism of injury: Pt is a 48 y/o female presenting to physical therapy s/p ORIF of the R ankle on . Pt reports having decreased pain of her R ankle especially with walking and stair negotiation. Pt reports she is also having improved motion and strength of her R ankle as well as being able to walk longer distances. However, pt reports she continues to have sensitivity of the incision as well as pain and difficulty with prolonged walking and standing. Pt reports she also continues to have tightness of the ankle with dorsiflexion. Quality of life: good    Patient Goals  Patient goal: to be able to walk and swim  Pain  Current pain ratin  At best pain ratin  At worst pain ratin  Location: R ankle  Quality: dull ache  Relieving factors: medications and rest  Aggravating factors: standing, walking and stair climbing  Progression: improved    Treatments  Previous treatment: medication  Current treatment: physical therapy        Objective     Observations     Right Ankle/Foot   Positive for edema.      Additional Observation Details  9.5 cm incision healed    Tenderness     Right Ankle/Foot   Tenderness in the lateral malleolus.      Active Range of Motion     Right Ankle/Foot   Dorsiflexion (ke): 6 degrees   Plantar flexion: 40 degrees   Inversion: 30 degrees with pain  Eversion: 20 degrees     Passive Range of Motion     Right Ankle/Foot    Dorsiflexion (ke): 8 degrees   Plantar flexion: 43 degrees   Inversion: 32 degrees   Eversion: 25 degrees     Strength/Myotome Testing     Right Ankle/Foot   Dorsiflexion: 4-  Plantar flexion: 4-  Inversion: 4-  Eversion: 4-    Swelling   Left Ankle/Foot   Malleoli: 29 cm    Right Ankle/Foot   Malleoli: 29 cm                   Precautions: HTN, ORIF on 12/29    POC expires Auth Status Unit limit Start date  Expiration date PT/OT + Visit Limit?   10/12 N/A N/A 2/20/23 12/31/23 BOMN             FOTO last done on 8/17       Manuals 7/6 7/10 7/18 7/20 7/28 8/10 8/17      PROM R ankle KT KT JOANN KT KT KT       Grade I/II post mobs             Gr. I joint distraction              subtalar mobilizations             DF MWM  8" 10x 5" 8" 10x 5" 8" 10x 5" 8" 10x 5" 8" 10x 5"       Neuro Re-Ed             Pt education   4'    4'      BAPS board ant/post, inv/ev             Tandem walking 3x at bar 3x at bar 3x at bar 3x at bar 3x at bar 3x at bar 3x at bar      y-balance 2x5 2x5 NV 2x5 2x5 2x5 2x10      Great toe Ext             Toe Yoga              wobble board   20x fwd/bwd, lat 20x fwd/bwd, lat 20x fwd/bwd, lat 20x fwd/bwd, lat 20x fwd/bwd, lat      Ther Ex             Standing heel raises 2x10 2x10 2x10 2x10 2x10 2x10 2x10      Standing toe raises 1x10 2x10 2x10 2x10 2x10 2x10 2x10      Alphabet             DF MWM c band       1x10 5"                   ProStretch 20"x5 20"x5 20"x5 20"x5 20"x5 20"x5 20"x5      Mini static lunge on foam 2x10 2x10 2x10 2x10 2x10 2x10       Nustep for LE ROM 6' 6' 6' 6' 6' 6' 6'      Ther Activity             Biodex motor control 3x lvl 6 3x lvl 6 3x lvl 6 3x lvl 7 3x lvl 7 3x lvl 7       Mini squats 2x10 2x10 2x10 2x10 2x10 2x10 2x10      Step ups 2x10 6" 2x10 6" 2x10 6" 2x10 6" 2x10 6" 2x10 6" 2x10 6"      Step downs 2x10 2" 2x10 2" 2x10 2" 2x10 2" 2x10 2" 2x10 2"       Gait Training             Ambulation in lace up brace                           Modalities             CP

## 2023-08-21 ENCOUNTER — APPOINTMENT (OUTPATIENT)
Dept: PHYSICAL THERAPY | Facility: CLINIC | Age: 48
End: 2023-08-21
Payer: OTHER MISCELLANEOUS

## 2023-08-22 ENCOUNTER — OFFICE VISIT (OUTPATIENT)
Dept: PHYSICAL THERAPY | Facility: CLINIC | Age: 48
End: 2023-08-22
Payer: OTHER MISCELLANEOUS

## 2023-08-22 DIAGNOSIS — S82.891D CLOSED FRACTURE OF RIGHT ANKLE WITH ROUTINE HEALING, SUBSEQUENT ENCOUNTER: Primary | ICD-10-CM

## 2023-08-22 PROCEDURE — 97112 NEUROMUSCULAR REEDUCATION: CPT

## 2023-08-22 PROCEDURE — 97140 MANUAL THERAPY 1/> REGIONS: CPT

## 2023-08-22 PROCEDURE — 97530 THERAPEUTIC ACTIVITIES: CPT

## 2023-08-22 PROCEDURE — 97110 THERAPEUTIC EXERCISES: CPT | Performed by: PHYSICAL THERAPIST

## 2023-08-22 NOTE — PROGRESS NOTES
Daily Note     Today's date: 2023  Patient name: Lindsay Morales  : 1975  MRN: [id number]  Referring provider: Susie Toledo MD  Dx:   Encounter Diagnosis     ICD-10-CM    1. Closed fracture of right ankle with routine healing, subsequent encounter  S82.891D                      Subjective: Pt reports she is sore from last visit. Objective: See treatment diary below      Assessment: Tolerated treatment well. She reports disliking TB DF MWM with some pain noted. Increased height for step downs this visit. No complaints of increased pain otherwise. Patient demonstrated fatigue post treatment, exhibited good technique with therapeutic exercises and would benefit from continued PT      Plan: Continue per plan of care.       Precautions: HTN, ORIF on     POC expires Auth Status Unit limit Start date  Expiration date PT/OT + Visit Limit?   10/12 N/A N/A 23 Markus Slimmer last done on        Manuals 7/6 7/10 7/18 7/20 7/28 8/10 8/17 8/22     PROM R ankle KT KT JOANN KT KT KT  KT     Grade I/II post mobs             Gr. I joint distraction              subtalar mobilizations             DF MWM  8" 10x 5" 8" 10x 5" 8" 10x 5" 8" 10x 5" 8" 10x 5"       Neuro Re-Ed             Pt education   4'    4'      BAPS board ant/post, inv/ev             Tandem walking 3x at bar 3x at bar 3x at bar 3x at bar 3x at bar 3x at bar 3x at bar 3x at bar     y-balance 2x5 2x5 NV 2x5 2x5 2x5 2x10 2x10     Great toe Ext             Toe Yoga              wobble board   20x fwd/bwd, lat 20x fwd/bwd, lat 20x fwd/bwd, lat 20x fwd/bwd, lat 20x fwd/bwd, lat 20x fwd/bwd, lat     Ther Ex             Standing heel raises 2x10 2x10 2x10 2x10 2x10 2x10 2x10 2x10     Standing toe raises 1x10 2x10 2x10 2x10 2x10 2x10 2x10 2x10     Alphabet             DF MWM c band       1x10 5" 1x10 5"                  ProStretch 20"x5 20"x5 20"x5 20"x5 20"x5 20"x5 20"x5 20"x5     Mini static lunge on foam 2x10 2x10 2x10 2x10 2x10 2x10       Nustep for LE ROM 6' 6' 6' 6' 6' 6' 6' 6'     Ther Activity             Biodex motor control 3x lvl 6 3x lvl 6 3x lvl 6 3x lvl 7 3x lvl 7 3x lvl 7  3x lvl 7     Mini squats 2x10 2x10 2x10 2x10 2x10 2x10 2x10 2x10     Step ups 2x10 6" 2x10 6" 2x10 6" 2x10 6" 2x10 6" 2x10 6" 2x10 6" 2x10 6"     Step downs 2x10 2" 2x10 2" 2x10 2" 2x10 2" 2x10 2" 2x10 2"  1x10 4"     Gait Training             Ambulation in lace up brace                           Modalities             CP               normal...

## 2023-08-24 ENCOUNTER — APPOINTMENT (OUTPATIENT)
Dept: PHYSICAL THERAPY | Facility: CLINIC | Age: 48
End: 2023-08-24
Payer: OTHER MISCELLANEOUS

## 2023-08-28 ENCOUNTER — OFFICE VISIT (OUTPATIENT)
Dept: PHYSICAL THERAPY | Facility: CLINIC | Age: 48
End: 2023-08-28
Payer: OTHER MISCELLANEOUS

## 2023-08-28 DIAGNOSIS — S82.891D CLOSED FRACTURE OF RIGHT ANKLE WITH ROUTINE HEALING, SUBSEQUENT ENCOUNTER: Primary | ICD-10-CM

## 2023-08-28 PROCEDURE — 97112 NEUROMUSCULAR REEDUCATION: CPT

## 2023-08-28 PROCEDURE — 97530 THERAPEUTIC ACTIVITIES: CPT

## 2023-08-28 PROCEDURE — 97110 THERAPEUTIC EXERCISES: CPT

## 2023-08-28 PROCEDURE — 97140 MANUAL THERAPY 1/> REGIONS: CPT

## 2023-08-28 NOTE — PROGRESS NOTES
Daily Note     Today's date: 2023  Patient name: Minal Ramirez  : 1975  MRN: [id number]  Referring provider: Morena Dooley MD  Dx:   Encounter Diagnosis     ICD-10-CM    1. Closed fracture of right ankle with routine healing, subsequent encounter  S82.891D                      Subjective: Pt reports she's not doing too good today. Objective: See treatment diary below      Assessment: Tolerated treatment well. Some slight increase in pain with exercises today. Due to pain pt was hesitant to push ROM with exercises. MWM with TB is still painful for pt as well. Patient demonstrated fatigue post treatment, exhibited good technique with therapeutic exercises and would benefit from continued PT      Plan: Continue per plan of care.       Precautions: HTN, ORIF on     POC expires Auth Status Unit limit Start date  Expiration date PT/OT + Visit Limit?   10/12 N/A N/A 23 Tegan Hertz last done on         Manuals 7/6 7/10 7/18 7/20 7/28 8/10 8/17 8/22 8/28    PROM R ankle KT KT JOANN KT KT KT  KT KT    Grade I/II post mobs             Gr. I joint distraction              subtalar mobilizations             DF MWM  8" 10x 5" 8" 10x 5" 8" 10x 5" 8" 10x 5" 8" 10x 5"       Neuro Re-Ed             Pt education   4'    4'      BAPS board ant/post, inv/ev             Tandem walking 3x at bar 3x at bar 3x at bar 3x at bar 3x at bar 3x at bar 3x at bar 3x at bar 3x at bar    y-balance 2x5 2x5 NV 2x5 2x5 2x5 2x10 2x10 2x10    Great toe Ext             Toe Yoga              wobble board   20x fwd/bwd, lat 20x fwd/bwd, lat 20x fwd/bwd, lat 20x fwd/bwd, lat 20x fwd/bwd, lat 20x fwd/bwd, lat 20x fwd/bwd/lat    Ther Ex             Standing heel raises 2x10 2x10 2x10 2x10 2x10 2x10 2x10 2x10 2x10    Standing toe raises 1x10 2x10 2x10 2x10 2x10 2x10 2x10 2x10 2x10    Alphabet             DF MWM c band       1x10 5" 1x10 5" 1x10 5"                 ProStretch 20"x5 20"x5 20"x5 20"x5 20"x5 20"x5 20"x5 20"x5 20"x5    Mini static lunge on foam 2x10 2x10 2x10 2x10 2x10 2x10       Nustep for LE ROM 6' 6' 6' 6' 6' 6' 6' 6' 6'    Ther Activity             Biodex motor control 3x lvl 6 3x lvl 6 3x lvl 6 3x lvl 7 3x lvl 7 3x lvl 7  3x lvl 7 3x lvl 7    Mini squats 2x10 2x10 2x10 2x10 2x10 2x10 2x10 2x10 2x10     Step ups 2x10 6" 2x10 6" 2x10 6" 2x10 6" 2x10 6" 2x10 6" 2x10 6" 2x10 6" 2x10 6"    Step downs 2x10 2" 2x10 2" 2x10 2" 2x10 2" 2x10 2" 2x10 2"  1x10 4" 1x10 4"    Gait Training             Ambulation in lace up brace                           Modalities             CP               2019

## 2023-08-31 ENCOUNTER — OFFICE VISIT (OUTPATIENT)
Dept: PHYSICAL THERAPY | Facility: CLINIC | Age: 48
End: 2023-08-31
Payer: OTHER MISCELLANEOUS

## 2023-08-31 DIAGNOSIS — S82.891D CLOSED FRACTURE OF RIGHT ANKLE WITH ROUTINE HEALING, SUBSEQUENT ENCOUNTER: Primary | ICD-10-CM

## 2023-08-31 PROCEDURE — 97110 THERAPEUTIC EXERCISES: CPT

## 2023-08-31 PROCEDURE — 97530 THERAPEUTIC ACTIVITIES: CPT

## 2023-08-31 PROCEDURE — 97112 NEUROMUSCULAR REEDUCATION: CPT

## 2023-08-31 PROCEDURE — 97140 MANUAL THERAPY 1/> REGIONS: CPT

## 2023-08-31 NOTE — PROGRESS NOTES
Daily Note     Today's date: 2023  Patient name: David Ozuna  : 1975  MRN: [id number]  Referring provider: Peewee Queen MD  Dx:   Encounter Diagnosis     ICD-10-CM    1. Closed fracture of right ankle with routine healing, subsequent encounter  S82.891D                      Subjective: Pt reports her pain is better than last visit but still more than usual.      Objective: See treatment diary below      Assessment: Tolerated treatment well. She tolerates all exercises as outlined below. Step downs and TB MWM still cause discomfort and pt verbalizes not liking them. Limitation continues with DF. Patient demonstrated fatigue post treatment, exhibited good technique with therapeutic exercises and would benefit from continued PT      Plan: Continue per plan of care.       Precautions: HTN, ORIF on     POC expires Auth Status Unit limit Start date  Expiration date PT/OT + Visit Limit?   10/12 N/A N/A 23 Doneta Gravel last done on         Manuals 7/6 7/10 7/18 7/20 7/28 8/10 8/17 8/22 8/28 8/31   PROM R ankle KT KT JOANN KT KT KT  KT KT KT   Grade I/II post mobs             Gr. I joint distraction              subtalar mobilizations             DF MWM  8" 10x 5" 8" 10x 5" 8" 10x 5" 8" 10x 5" 8" 10x 5"       Neuro Re-Ed             Pt education   4'    4'      BAPS board ant/post, inv/ev             Tandem walking 3x at bar 3x at bar 3x at bar 3x at bar 3x at bar 3x at bar 3x at bar 3x at bar 3x at bar 3x at bar   y-balance 2x5 2x5 NV 2x5 2x5 2x5 2x10 2x10 2x10 2x10   Great toe Ext             Toe Yoga              wobble board   20x fwd/bwd, lat 20x fwd/bwd, lat 20x fwd/bwd, lat 20x fwd/bwd, lat 20x fwd/bwd, lat 20x fwd/bwd, lat 20x fwd/bwd/lat 20x fwd/bwd/lat   Ther Ex             Standing heel raises 2x10 2x10 2x10 2x10 2x10 2x10 2x10 2x10 2x10 2x10   Standing toe raises 1x10 2x10 2x10 2x10 2x10 2x10 2x10 2x10 2x10 2x10   Alphabet             DF MWM c band 1x10 5" 1x10 5" 1x10 5" 1x10 5"                ProStretch 20"x5 20"x5 20"x5 20"x5 20"x5 20"x5 20"x5 20"x5 20"x5 20"x5   Mini static lunge on foam 2x10 2x10 2x10 2x10 2x10 2x10       Nustep for LE ROM 6' 6' 6' 6' 6' 6' 6' 6' 6' 6'   Ther Activity             Biodex motor control 3x lvl 6 3x lvl 6 3x lvl 6 3x lvl 7 3x lvl 7 3x lvl 7  3x lvl 7 3x lvl 7 3x lvl 7   Mini squats 2x10 2x10 2x10 2x10 2x10 2x10 2x10 2x10 2x10  2x10   Step ups 2x10 6" 2x10 6" 2x10 6" 2x10 6" 2x10 6" 2x10 6" 2x10 6" 2x10 6" 2x10 6" 2x10 6"   Step downs 2x10 2" 2x10 2" 2x10 2" 2x10 2" 2x10 2" 2x10 2"  1x10 4" 1x10 4" 1x10 4"   Gait Training             Ambulation in lace up brace                           Modalities             CP

## 2024-02-02 DIAGNOSIS — S82.841D CLOSED BIMALLEOLAR FRACTURE OF RIGHT ANKLE WITH ROUTINE HEALING, SUBSEQUENT ENCOUNTER: Primary | ICD-10-CM

## 2024-02-06 ENCOUNTER — APPOINTMENT (OUTPATIENT)
Dept: RADIOLOGY | Facility: CLINIC | Age: 49
End: 2024-02-06
Payer: COMMERCIAL

## 2024-02-06 ENCOUNTER — TELEPHONE (OUTPATIENT)
Dept: OBGYN CLINIC | Facility: HOSPITAL | Age: 49
End: 2024-02-06

## 2024-02-06 ENCOUNTER — OFFICE VISIT (OUTPATIENT)
Dept: OBGYN CLINIC | Facility: CLINIC | Age: 49
End: 2024-02-06
Payer: COMMERCIAL

## 2024-02-06 VITALS
HEIGHT: 64 IN | WEIGHT: 210 LBS | HEART RATE: 88 BPM | DIASTOLIC BLOOD PRESSURE: 99 MMHG | SYSTOLIC BLOOD PRESSURE: 141 MMHG | BODY MASS INDEX: 35.85 KG/M2

## 2024-02-06 DIAGNOSIS — S82.841D CLOSED BIMALLEOLAR FRACTURE OF RIGHT ANKLE WITH ROUTINE HEALING, SUBSEQUENT ENCOUNTER: ICD-10-CM

## 2024-02-06 DIAGNOSIS — M76.821 POSTERIOR TIBIAL TENDONITIS, RIGHT: Primary | ICD-10-CM

## 2024-02-06 DIAGNOSIS — M76.71 PERONEAL TENDONITIS, RIGHT: ICD-10-CM

## 2024-02-06 DIAGNOSIS — Z98.890 STATUS POST OPEN REDUCTION AND INTERNAL FIXATION (ORIF) OF FRACTURE: ICD-10-CM

## 2024-02-06 DIAGNOSIS — Z87.81 STATUS POST OPEN REDUCTION AND INTERNAL FIXATION (ORIF) OF FRACTURE: ICD-10-CM

## 2024-02-06 PROCEDURE — 73610 X-RAY EXAM OF ANKLE: CPT

## 2024-02-06 PROCEDURE — 99213 OFFICE O/P EST LOW 20 MIN: CPT | Performed by: ORTHOPAEDIC SURGERY

## 2024-02-06 RX ORDER — TIRZEPATIDE 7.5 MG/.5ML
INJECTION, SOLUTION SUBCUTANEOUS
COMMUNITY
Start: 2024-01-31

## 2024-02-06 NOTE — PROGRESS NOTES
"Orthopaedics Office Visit - Follow Up Patient Visit    ASSESSMENT/PLAN:    Assessment:   13 months status post ORIF right ankle bimalleolar equivalent fracture, DOS 2022  Peroneal and posterior tibial tendinitis now present  Hypersensitivity of incision resolved    Plan:   Continue performing home exercise program as tolerated  Continue using orthotics and shoes to aid in supporting the foot  Apply Voltaren gel to painful area up to 4 times daily as needed.  This was sent to the patient's pharmacy today  May take oral anti-inflammatories as needed for pain  Follow-up in 1 year for repeat evaluation and three-view x-ray of right ankle    To Do Next Visit:  Reevaluation, three-view x-ray right ankle    _____________________________________________________  CHIEF COMPLAINT:  Chief Complaint   Patient presents with    Right Ankle - Follow-up     Pain when walking down stairs         SUBJECTIVE:  Jessy Johnson is a 47 y.o. female who presents for follow-up evaluation of her right ankle. She is approximately 13 months s/p ORIF bimalleolar equivalent fracture of her right ankle, completed on 22.  On today's presentation, she reports significant improvement since her last visit.  She describes dull, 2/10 related pain posterior to both medial and lateral malleoli.  She describes pain with descending stairs and is not able to descend in an alternating fashion.  Denies paresthesias, secondary trauma, radiating pain, instability sensation.    PAST MEDICAL HISTORY:  Past Medical History:   Diagnosis Date    Allergic     GERD (gastroesophageal reflux disease)     Hypertension     Sleep apnea     does not use cpap \"yet\"    Sleep apnea, obstructive     Varicella        PAST SURGICAL HISTORY:  Past Surgical History:   Procedure Laterality Date     SECTION      x2     RI OPEN TX DISTAL FIBULAR FRACTURE LAT MALLEOLUS Right 2022    Procedure: OPEN REDUCTION W/ INTERNAL FIXATION (ORIF) RIGHT ANKLE BIMALL " EQUIV FRACTURE;  Surgeon: Jim Morse MD;  Location: [facility];  Service: Orthopedics       FAMILY HISTORY:  Family History   Problem Relation Age of Onset    Esophageal cancer Mother     Heart disease Father     No Known Problems Sister     No Known Problems Daughter     No Known Problems Maternal Grandmother     No Known Problems Maternal Grandfather     No Known Problems Paternal Grandmother     No Known Problems Paternal Grandfather     No Known Problems Sister     No Known Problems Brother     No Known Problems Brother     No Known Problems Son     No Known Problems Maternal Aunt     No Known Problems Paternal Aunt     No Known Problems Paternal Aunt     No Known Problems Paternal Aunt     No Known Problems Paternal Aunt     No Known Problems Paternal Aunt        SOCIAL HISTORY:  Social History     Tobacco Use    Smoking status: Never    Smokeless tobacco: Never   Vaping Use    Vaping status: Never Used   Substance Use Topics    Alcohol use: Not Currently    Drug use: Never       MEDICATIONS:    Current Outpatient Medications:     Mounjaro 7.5 MG/0.5ML, , Disp: , Rfl:     multivitamin (THERAGRAN) TABS, Take 1 tablet by mouth daily, Disp: , Rfl:     VITAMIN D PO, Take by mouth, Disp: , Rfl:     aspirin (ECOTRIN LOW STRENGTH) 81 mg EC tablet, Take 1 tablet (81 mg total) by mouth every 12 (twelve) hours, Disp: 84 tablet, Rfl: 0    Diclofenac Sodium (VOLTAREN) 1 %, Apply 2 g topically 4 (four) times a day (Patient not taking: Reported on 2/6/2024), Disp: 100 g, Rfl: 0    Iron-Vitamin C (Vitron-C)  MG TABS, Take 1 tablet by mouth 2 (two) times a day (Patient not taking: Reported on 2/6/2024), Disp: 60 tablet, Rfl: 3    levothyroxine (Levo-T) 75 mcg tablet, Take 1 tablet (75 mcg total) by mouth daily in the early morning (Patient not taking: Reported on 2/6/2024), Disp: 30 tablet, Rfl: 5    sertraline (ZOLOFT) 50 mg tablet, take 1 tablet by mouth once daily (Patient not taking: Reported on 2/6/2024),  "Disp: 90 tablet, Rfl: 5    ALLERGIES:  No Known Allergies    REVIEW OF SYSTEMS:  MSK: Right ankle  Neuro: WNL  Pertinent items are otherwise noted in HPI.  A comprehensive review of systems was otherwise negative.    LABS:  HgA1c: No results found for: \"HGBA1C\"  BMP:   Lab Results   Component Value Date    CALCIUM 8.9 05/03/2023    K 4.3 05/03/2023    CO2 28 05/03/2023     (H) 05/03/2023    BUN 8 05/03/2023    CREATININE 0.95 05/03/2023     CBC: No components found for: \"CBC\"    _____________________________________________________  PHYSICAL EXAMINATION:  Vital signs: /99   Pulse 88   Ht 5' 4\" (1.626 m)   Wt 95.3 kg (210 lb)   BMI 36.05 kg/m²   General: No acute distress, awake and alert  Psychiatric: Mood and affect appear appropriate  HEENT: Trachea Midline, No torticollis, no apparent facial trauma  Cardiovascular: No audible murmurs; Extremities appear perfused  Pulmonary: No audible wheezing or stridor  Skin: No open lesions; see further details (if any) below    MUSCULOSKELETAL EXAMINATION:  Right Foot & Ankle Exam  Alignment:  Normal ankle alignment.  Inspection:  No swelling. No erythema. No ecchymosis. No muscle atrophy. No deformity. Incision healed.  Palpation:   no incisional tenderness. No effusion. No crepitus. No clicking, catching, or snapping.  ROM:   limited DF. PF, INV, EV WNL and pain free  Strength:  5/5 AT, GSC, PT, and peroneals.  Stability:  Not tested.  Tests:  No pertinent positive or negative tests.  Neurovascular:  Sensation intact in DP/SP/Phelps/Sa/T nerve distributions. 2+ DP & PT pulses. Brisk capillary refill in all toes.  Gait:  Normal.        _____________________________________________________  STUDIES REVIEWED:  I personally reviewed the images and interpretation is as follows:  X-ray of right ankle obtained today reviewed and demonstrates: Orthopedic hardware in excellent position alignment with no signs of loosening or failure    PROCEDURES " PERFORMED:  Procedures  No procedures performed today      Scribe Attestation      I,:  Christa Guillory am acting as a scribe while in the presence of the attending physician.:       I,:  Jim Morse MD personally performed the services described in this documentation    as scribed in my presence.:

## 2024-02-06 NOTE — TELEPHONE ENCOUNTER
Caller: [place] / Sharda Finley     Doctor: Mini Flores     Reason for call: New Insurance to replace patients WC     Please forward all billing to:    [employer]   [id number]  [place] [address]  Claim # [id number]    Call back#: [phone]

## 2024-04-19 ENCOUNTER — HOSPITAL ENCOUNTER (EMERGENCY)
Facility: HOSPITAL | Age: 49
Discharge: HOME | End: 2024-04-19
Attending: EMERGENCY MEDICINE | Admitting: EMERGENCY MEDICINE
Payer: COMMERCIAL

## 2024-04-19 ENCOUNTER — OFFICE VISIT (OUTPATIENT)
Dept: URGENT CARE | Facility: CLINIC | Age: 49
End: 2024-04-19
Payer: COMMERCIAL

## 2024-04-19 VITALS — HEART RATE: 80 BPM | RESPIRATION RATE: 18 BRPM | TEMPERATURE: 98.5 F | OXYGEN SATURATION: 99 %

## 2024-04-19 VITALS
OXYGEN SATURATION: 99 % | HEART RATE: 98 BPM | DIASTOLIC BLOOD PRESSURE: 96 MMHG | RESPIRATION RATE: 18 BRPM | TEMPERATURE: 98.1 F | SYSTOLIC BLOOD PRESSURE: 174 MMHG

## 2024-04-19 DIAGNOSIS — R11.2 NAUSEA AND VOMITING, UNSPECIFIED VOMITING TYPE: Primary | ICD-10-CM

## 2024-04-19 DIAGNOSIS — R11.2 NAUSEA AND VOMITING: Primary | ICD-10-CM

## 2024-04-19 LAB
ALBUMIN SERPL BCP-MCNC: 4.3 G/DL (ref 3.5–5)
ALP SERPL-CCNC: 113 U/L (ref 34–104)
ALT SERPL W P-5'-P-CCNC: 14 U/L (ref 7–52)
ANION GAP SERPL CALCULATED.3IONS-SCNC: 8 MMOL/L (ref 4–13)
AST SERPL W P-5'-P-CCNC: 18 U/L (ref 13–39)
BASOPHILS # BLD AUTO: 0.06 THOUSANDS/ÂΜL (ref 0–0.1)
BASOPHILS NFR BLD AUTO: 0 % (ref 0–1)
BILIRUB SERPL-MCNC: 0.72 MG/DL (ref 0.2–1)
BUN SERPL-MCNC: 11 MG/DL (ref 5–25)
CALCIUM SERPL-MCNC: 8.8 MG/DL (ref 8.4–10.2)
CHLORIDE SERPL-SCNC: 104 MMOL/L (ref 96–108)
CO2 SERPL-SCNC: 25 MMOL/L (ref 21–32)
CREAT SERPL-MCNC: 0.77 MG/DL (ref 0.6–1.3)
EOSINOPHIL # BLD AUTO: 0.04 THOUSAND/ÂΜL (ref 0–0.61)
EOSINOPHIL NFR BLD AUTO: 0 % (ref 0–6)
ERYTHROCYTE [DISTWIDTH] IN BLOOD BY AUTOMATED COUNT: 15 % (ref 11.6–15.1)
GFR SERPL CREATININE-BSD FRML MDRD: 90 ML/MIN/1.73SQ M
GLUCOSE SERPL-MCNC: 82 MG/DL (ref 65–140)
GLUCOSE SERPL-MCNC: 88 MG/DL (ref 65–140)
HCT VFR BLD AUTO: 38.6 % (ref 34.8–46.1)
HGB BLD-MCNC: 12.6 G/DL (ref 11.5–15.4)
IMM GRANULOCYTES # BLD AUTO: 0.06 THOUSAND/UL (ref 0–0.2)
IMM GRANULOCYTES NFR BLD AUTO: 0 % (ref 0–2)
LIPASE SERPL-CCNC: 50 U/L (ref 11–82)
LYMPHOCYTES # BLD AUTO: 2.65 THOUSANDS/ÂΜL (ref 0.6–4.47)
LYMPHOCYTES NFR BLD AUTO: 18 % (ref 14–44)
MAGNESIUM SERPL-MCNC: 2.1 MG/DL (ref 1.9–2.7)
MCH RBC QN AUTO: 27 PG (ref 26.8–34.3)
MCHC RBC AUTO-ENTMCNC: 32.6 G/DL (ref 31.4–37.4)
MCV RBC AUTO: 83 FL (ref 82–98)
MONOCYTES # BLD AUTO: 0.78 THOUSAND/ÂΜL (ref 0.17–1.22)
MONOCYTES NFR BLD AUTO: 5 % (ref 4–12)
NEUTROPHILS # BLD AUTO: 11.4 THOUSANDS/ÂΜL (ref 1.85–7.62)
NEUTS SEG NFR BLD AUTO: 77 % (ref 43–75)
NRBC BLD AUTO-RTO: 0 /100 WBCS
PLATELET # BLD AUTO: 417 THOUSANDS/UL (ref 149–390)
PMV BLD AUTO: 11.1 FL (ref 8.9–12.7)
POTASSIUM SERPL-SCNC: 4.1 MMOL/L (ref 3.5–5.3)
PROT SERPL-MCNC: 8.3 G/DL (ref 6.4–8.4)
RBC # BLD AUTO: 4.66 MILLION/UL (ref 3.81–5.12)
SODIUM SERPL-SCNC: 137 MMOL/L (ref 135–147)
WBC # BLD AUTO: 14.99 THOUSAND/UL (ref 4.31–10.16)

## 2024-04-19 PROCEDURE — 99284 EMERGENCY DEPT VISIT MOD MDM: CPT

## 2024-04-19 PROCEDURE — 96375 TX/PRO/DX INJ NEW DRUG ADDON: CPT

## 2024-04-19 PROCEDURE — 93005 ELECTROCARDIOGRAM TRACING: CPT

## 2024-04-19 PROCEDURE — 99283 EMERGENCY DEPT VISIT LOW MDM: CPT | Performed by: PHYSICIAN ASSISTANT

## 2024-04-19 PROCEDURE — 96361 HYDRATE IV INFUSION ADD-ON: CPT

## 2024-04-19 PROCEDURE — 83690 ASSAY OF LIPASE: CPT | Performed by: PHYSICIAN ASSISTANT

## 2024-04-19 PROCEDURE — 96374 THER/PROPH/DIAG INJ IV PUSH: CPT

## 2024-04-19 PROCEDURE — 82948 REAGENT STRIP/BLOOD GLUCOSE: CPT

## 2024-04-19 PROCEDURE — 99284 EMERGENCY DEPT VISIT MOD MDM: CPT | Performed by: PHYSICIAN ASSISTANT

## 2024-04-19 PROCEDURE — 83735 ASSAY OF MAGNESIUM: CPT | Performed by: PHYSICIAN ASSISTANT

## 2024-04-19 PROCEDURE — G0382 LEV 3 HOSP TYPE B ED VISIT: HCPCS | Performed by: PHYSICIAN ASSISTANT

## 2024-04-19 PROCEDURE — 80053 COMPREHEN METABOLIC PANEL: CPT | Performed by: PHYSICIAN ASSISTANT

## 2024-04-19 PROCEDURE — 85025 COMPLETE CBC W/AUTO DIFF WBC: CPT | Performed by: PHYSICIAN ASSISTANT

## 2024-04-19 PROCEDURE — 36415 COLL VENOUS BLD VENIPUNCTURE: CPT | Performed by: PHYSICIAN ASSISTANT

## 2024-04-19 RX ORDER — DIPHENHYDRAMINE HYDROCHLORIDE 50 MG/ML
25 INJECTION INTRAMUSCULAR; INTRAVENOUS ONCE
Status: COMPLETED | OUTPATIENT
Start: 2024-04-19 | End: 2024-04-19

## 2024-04-19 RX ORDER — METOCLOPRAMIDE HYDROCHLORIDE 5 MG/ML
10 INJECTION INTRAMUSCULAR; INTRAVENOUS ONCE
Status: COMPLETED | OUTPATIENT
Start: 2024-04-19 | End: 2024-04-19

## 2024-04-19 RX ORDER — SEMAGLUTIDE 2.68 MG/ML
INJECTION, SOLUTION SUBCUTANEOUS
COMMUNITY
Start: 2024-04-17

## 2024-04-19 RX ORDER — METOCLOPRAMIDE 10 MG/1
10 TABLET ORAL EVERY 6 HOURS PRN
Qty: 30 TABLET | Refills: 0 | Status: SHIPPED | OUTPATIENT
Start: 2024-04-19

## 2024-04-19 RX ADMIN — SODIUM CHLORIDE 1000 ML: 0.9 INJECTION, SOLUTION INTRAVENOUS at 13:52

## 2024-04-19 RX ADMIN — METOCLOPRAMIDE 10 MG: 5 INJECTION, SOLUTION INTRAMUSCULAR; INTRAVENOUS at 13:53

## 2024-04-19 RX ADMIN — DIPHENHYDRAMINE HYDROCHLORIDE 25 MG: 50 INJECTION, SOLUTION INTRAMUSCULAR; INTRAVENOUS at 13:52

## 2024-04-19 NOTE — DISCHARGE INSTRUCTIONS
Take Reglan as needed for nausea. Drink plenty of fluids and eat a bland diet (rice, applesauce, toast, bananas).    Please follow-up with your family doctor. Return to the ER with any worsening symptoms.

## 2024-04-19 NOTE — PROGRESS NOTES
[facility]        NAME: Jessy Johnson is a 48 y.o. female  : 1975    MRN: [id number]  DATE: 2024  TIME: 11:38 AM    Assessment and Plan   Nausea and vomiting, unspecified vomiting type [R11.2]  1. Nausea and vomiting, unspecified vomiting type          Recommended patient go to the Emergency Department for evaluation of nausea and vomiting after taking an extra dose of Ozempic. Advised patient I can call an ambulance for her as she does not have a ride. Patient states she will call and uber.     Patient Instructions     Patient Instructions   Follow up with PCP in 3-5 days.  Proceed to  ER if symptoms worsen.    If tests are performed, our office will contact you with results only if changes need to made to the care plan discussed with you at the visit. [facility] Acute Nausea and Vomiting   WHAT YOU NEED TO KNOW:   Acute means the nausea and vomiting starts suddenly, gets worse quickly, and lasts a short time. There are many possible causes of acute nausea and vomiting.  DISCHARGE INSTRUCTIONS:   Call your local emergency number (911 in the [place]) if:   You have chest pain.    You have severe pain or cramping in your abdomen.    Your vision is blurred.    You are confused, have a high fever, or a stiff neck.    You have bright red blood coming from your rectum.    Your vomit smells like bowel movement.    Return to the emergency department if:   You have a severe headache or pain.    You are dizzy, cold, and thirsty, and your eyes and mouth are dry.    You are urinating very little or not at all.    You are dizzy or lightheaded when you stand up.    You see blood or material that looks like coffee grounds in your vomit.    Call your doctor if:   You continue to vomit for more than 48 hours.    Your nausea and vomiting does not get better or go away after you use medicine.    You have questions or concerns about your condition or  care.    Medicines:  You may need any of the following:  Medicines  may be given to calm your stomach and stop your vomiting. You may also need medicines to help empty your stomach and bowels.    Take your medicine as directed.  Contact your healthcare provider if you think your medicine is not helping or if you have side effects. Tell your provider if you are allergic to any medicine. Keep a list of the medicines, vitamins, and herbs you take. Include the amounts, and when and why you take them. Bring the list or the pill bottles to follow-up visits. Carry your medicine list with you in case of an emergency.    Manage your symptoms:   Rest as much as you can.  Too much activity can make your nausea worse.    Drink more liquids to prevent dehydration.  Take small sips. Try drinks such as ginger ale, lemonade, water, or tea. Your provider may recommend that you drink an oral rehydration solution (ORS). ORS contains water, salts, and sugar that are needed to replace the lost body fluids.    Eat smaller meals, more often.  Try bland foods and avoid spices or strong flavors    Do not drink alcohol.  Alcohol may upset or irritate your stomach.    Follow up with your doctor as directed:  Write down your questions so you remember to ask them during your visits.  © Copyright [employer] 2023 Information is for End User's use only and may not be sold, redistributed or otherwise used for commercial purposes.  The above information is an  only. It is not intended as medical advice for individual conditions or treatments. Talk to your doctor, nurse or pharmacist before following any medical regimen to see if it is safe and effective for you.      Follow up with PCP in 3-5 days.  Proceed to  ER if symptoms worsen.    If tests are performed, our office will contact you with results only if changes need to made to the care plan discussed with you at the visit. You can review your full results on [facility].    Chief Complaint     Chief Complaint   Patient presents with    Nausea     Patient accidentally took a double dose of her ozempic script yesterday. She was unsure if the first injection went through cause the needle did not look right, so she did it again and realized both went through. She has been vomiting since then. Patient denies diarrhea.          History of Present Illness       Patient presents today for evaluation of weakness, nausea, and vomiting since last night. Patient states that she accidentally took two doses of Ozempic last night. She states the needle bent so she didn't think the dose went through. She changed the needle and did it again, then she noticed that it was a second dose. Patient states that ever since then she has been feeling weak and nauseous. She states she has been throwing up and cannot keep anything down. She tried to drink tea this morning but threw it up. She states she has some dizziness and some weakness. Denies any palpitations or difficulty breathing. She states she did not call her PCP who prescribed the mediation for advice. She did not drive herself here today, she took an Uber.     Nausea  Associated symptoms include abdominal pain, fatigue, nausea, vomiting and weakness. Pertinent negatives include no chest pain, chills, diaphoresis, fever, headaches, numbness or rash.       Review of Systems   Review of Systems   Constitutional:  Positive for activity change, appetite change and fatigue. Negative for chills, diaphoresis and fever.   Eyes:  Negative for visual disturbance.   Respiratory:  Negative for apnea, shortness of breath, wheezing and stridor.    Cardiovascular:  Negative for chest pain, palpitations and leg swelling.   Gastrointestinal:  Positive for abdominal pain, nausea and vomiting. Negative for abdominal distention, anal bleeding, blood in stool, constipation, diarrhea and rectal pain.   Skin:  Negative for color change, pallor, rash and wound.  "  Neurological:  Positive for dizziness, weakness and light-headedness. Negative for tremors, seizures, syncope, speech difficulty, numbness and headaches.         Current Medications       Current Outpatient Medications:     Mounjaro 7.5 MG/0.5ML, , Disp: , Rfl:     multivitamin (THERAGRAN) TABS, Take 1 tablet by mouth daily, Disp: , Rfl:     Ozempic, 2 MG/DOSE, 8 MG/3ML injection pen, inject 2 milligram subcutaneously weekly, Disp: , Rfl:     VITAMIN D PO, Take by mouth, Disp: , Rfl:     aspirin (ECOTRIN LOW STRENGTH) 81 mg EC tablet, Take 1 tablet (81 mg total) by mouth every 12 (twelve) hours (Patient not taking: Reported on 4/19/2024), Disp: 84 tablet, Rfl: 0    Diclofenac Sodium (VOLTAREN) 1 %, Apply 2 g topically 4 (four) times a day (Patient not taking: Reported on 2/6/2024), Disp: 100 g, Rfl: 0    Diclofenac Sodium (VOLTAREN) 1 %, Apply 2 g topically 4 (four) times a day as needed (ankle pain) (Patient not taking: Reported on 4/19/2024), Disp: 100 g, Rfl: 2    Iron-Vitamin C (Vitron-C)  MG TABS, Take 1 tablet by mouth 2 (two) times a day (Patient not taking: Reported on 2/6/2024), Disp: 60 tablet, Rfl: 3    levothyroxine (Levo-T) 75 mcg tablet, Take 1 tablet (75 mcg total) by mouth daily in the early morning (Patient not taking: Reported on 2/6/2024), Disp: 30 tablet, Rfl: 5    sertraline (ZOLOFT) 50 mg tablet, take 1 tablet by mouth once daily (Patient not taking: Reported on 2/6/2024), Disp: 90 tablet, Rfl: 5    Current Allergies     Allergies as of 04/19/2024    (No Known Allergies)            The following portions of the patient's history were reviewed and updated as appropriate: allergies, current medications, past family history, past medical history, past social history, past surgical history and problem list.     Past Medical History:   Diagnosis Date    Allergic     GERD (gastroesophageal reflux disease)     Hypertension     Sleep apnea     does not use cpap \"yet\"    Sleep apnea, obstructive "     Varicella        Past Surgical History:   Procedure Laterality Date     SECTION      x2     HI OPEN TX DISTAL FIBULAR FRACTURE LAT MALLEOLUS Right 2022    Procedure: OPEN REDUCTION W/ INTERNAL FIXATION (ORIF) RIGHT ANKLE BIMALL EQUIV FRACTURE;  Surgeon: Jim Morse MD;  Location:  MAIN OR;  Service: Orthopedics       Family History   Problem Relation Age of Onset    Esophageal cancer Mother     Heart disease Father     No Known Problems Sister     No Known Problems Daughter     No Known Problems Maternal Grandmother     No Known Problems Maternal Grandfather     No Known Problems Paternal Grandmother     No Known Problems Paternal Grandfather     No Known Problems Sister     No Known Problems Brother     No Known Problems Brother     No Known Problems Son     No Known Problems Maternal Aunt     No Known Problems Paternal Aunt     No Known Problems Paternal Aunt     No Known Problems Paternal Aunt     No Known Problems Paternal Aunt     No Known Problems Paternal Aunt          Medications have been verified.        Objective   Pulse 80   Temp 98.5 °F (36.9 °C)   Resp 18   SpO2 99%        Physical Exam     Physical Exam  Vitals and nursing note reviewed.   Constitutional:       General: She is not in acute distress.     Appearance: Normal appearance. She is ill-appearing. She is not toxic-appearing or diaphoretic.   HENT:      Head: Normocephalic and atraumatic.   Skin:     General: Skin is warm and dry.      Capillary Refill: Capillary refill takes 2 to 3 seconds.      Coloration: Skin is not pale.   Neurological:      General: No focal deficit present.      Mental Status: She is alert and oriented to person, place, and time.   Psychiatric:         Mood and Affect: Mood normal.         Behavior: Behavior normal.

## 2024-04-19 NOTE — ED PROVIDER NOTES
"History  Chief Complaint   Patient presents with    Overdose - Accidental     Pt states \"I accidentally took 4 mg of ozempic last night isntead of 2mg. I wasn't sure if the needle went in the first time, so I took a second dose. I've been n/v since then.\"     49yo female with a history of hypertension, GERD, obesity, LESTER presenting for evaluation of nausea and vomiting. She was previously taking Mounjaro for weight loss. The medication was on back order for several weeks so she was switched to Ozempic. She took Ozempic for the first time yesterday around 9pm. She injected herself with 2mg but wasn't sure that the medication went in because the needle was slightly bent. She gave herself a second injection and then realized she gave herself two doses to total 4mg. She started with nausea about 1 hour after the injection. She has been vomiting continuously since then and has been unable to keep anything down. She was seen at urgent care and told to go to the ED for evaluation. She denies any chest pain, shortness of breath, abdominal pain, diarrhea, fevers.       History provided by:  Patient and medical records   used: No    Overdose - Accidental  Associated symptoms: nausea and vomiting    Associated symptoms: no chest pain and no shortness of breath        Prior to Admission Medications   Prescriptions Last Dose Informant Patient Reported? Taking?   Diclofenac Sodium (VOLTAREN) 1 %  Self No No   Sig: Apply 2 g topically 4 (four) times a day   Patient not taking: Reported on 2/6/2024   Diclofenac Sodium (VOLTAREN) 1 %   No No   Sig: Apply 2 g topically 4 (four) times a day as needed (ankle pain)   Patient not taking: Reported on 4/19/2024   Iron-Vitamin C (Vitron-C)  MG TABS  Self No No   Sig: Take 1 tablet by mouth 2 (two) times a day   Patient not taking: Reported on 2/6/2024   Mounjaro 7.5 MG/0.5ML  Self Yes No   Ozempic, 2 MG/DOSE, 8 MG/3ML injection pen   Yes No   Sig: inject 2 " "milligram subcutaneously weekly   VITAMIN D PO  Self Yes No   Sig: Take by mouth   aspirin (ECOTRIN LOW STRENGTH) 81 mg EC tablet   No No   Sig: Take 1 tablet (81 mg total) by mouth every 12 (twelve) hours   Patient not taking: Reported on 2024   levothyroxine (Levo-T) 75 mcg tablet  Self No No   Sig: Take 1 tablet (75 mcg total) by mouth daily in the early morning   Patient not taking: Reported on 2024   multivitamin (THERAGRAN) TABS  Self Yes No   Sig: Take 1 tablet by mouth daily   sertraline (ZOLOFT) 50 mg tablet  Self No No   Sig: take 1 tablet by mouth once daily   Patient not taking: Reported on 2024      Facility-Administered Medications: None       Past Medical History:   Diagnosis Date    Allergic     GERD (gastroesophageal reflux disease)     Hypertension     Sleep apnea     does not use cpap \"yet\"    Sleep apnea, obstructive     Varicella        Past Surgical History:   Procedure Laterality Date     SECTION      x2     CA OPEN TX DISTAL FIBULAR FRACTURE LAT MALLEOLUS Right 2022    Procedure: OPEN REDUCTION W/ INTERNAL FIXATION (ORIF) RIGHT ANKLE BIMALL EQUIV FRACTURE;  Surgeon: Jim Morse MD;  Location: [facility];  Service: Orthopedics       Family History   Problem Relation Age of Onset    Esophageal cancer Mother     Heart disease Father     No Known Problems Sister     No Known Problems Daughter     No Known Problems Maternal Grandmother     No Known Problems Maternal Grandfather     No Known Problems Paternal Grandmother     No Known Problems Paternal Grandfather     No Known Problems Sister     No Known Problems Brother     No Known Problems Brother     No Known Problems Son     No Known Problems Maternal Aunt     No Known Problems Paternal Aunt     No Known Problems Paternal Aunt     No Known Problems Paternal Aunt     No Known Problems Paternal Aunt     No Known Problems Paternal Aunt      I have reviewed and agree with the history as " documented.    E-Cigarette/Vaping    E-Cigarette Use Never User      E-Cigarette/Vaping Substances    Nicotine No     THC No     CBD No     Flavoring No     Other No     Unknown No      Social History     Tobacco Use    Smoking status: Never    Smokeless tobacco: Never   Vaping Use    Vaping status: Never Used   Substance Use Topics    Alcohol use: Not Currently    Drug use: Never       Review of Systems   Constitutional:  Negative for chills and fever.   HENT:  Negative for drooling and voice change.    Eyes:  Negative for discharge and redness.   Respiratory:  Negative for shortness of breath and stridor.    Cardiovascular:  Negative for chest pain and leg swelling.   Gastrointestinal:  Positive for nausea and vomiting.   Musculoskeletal:  Negative for neck pain and neck stiffness.   Skin:  Negative for color change and rash.   Neurological:  Positive for light-headedness. Negative for seizures and syncope.   Psychiatric/Behavioral:  Negative for confusion. The patient is not nervous/anxious.    All other systems reviewed and are negative.      Physical Exam  Physical Exam  Vitals and nursing note reviewed.   Constitutional:       General: She is not in acute distress.     Appearance: She is well-developed. She is not diaphoretic.   HENT:      Head: Normocephalic and atraumatic.      Right Ear: External ear normal.      Left Ear: External ear normal.      Nose: Nose normal.   Eyes:      General: No scleral icterus.        Right eye: No discharge.         Left eye: No discharge.      Conjunctiva/sclera: Conjunctivae normal.   Cardiovascular:      Rate and Rhythm: Normal rate and regular rhythm.      Heart sounds: Normal heart sounds. No murmur heard.  Pulmonary:      Effort: Pulmonary effort is normal. No respiratory distress.      Breath sounds: Normal breath sounds. No stridor. No wheezing or rales.   Abdominal:      General: Bowel sounds are normal. There is no distension.      Palpations: Abdomen is soft.       Tenderness: There is no abdominal tenderness. There is no guarding.   Musculoskeletal:         General: No deformity. Normal range of motion.      Cervical back: Normal range of motion and neck supple.   Lymphadenopathy:      Cervical: No cervical adenopathy.   Skin:     General: Skin is warm and dry.   Neurological:      Mental Status: She is alert. She is not disoriented.      GCS: GCS eye subscore is 4. GCS verbal subscore is 5. GCS motor subscore is 6.   Psychiatric:         Behavior: Behavior normal.         Vital Signs  ED Triage Vitals [04/19/24 1222]   Temperature Pulse Respirations Blood Pressure SpO2   98.8 °F (37.1 °C) 82 18 (!) 187/111 98 %      Temp Source Heart Rate Source Patient Position - Orthostatic VS BP Location FiO2 (%)   Temporal Monitor Sitting Left arm --      Pain Score       --           Vitals:    04/19/24 1222 04/19/24 1558   BP: (!) 187/111 (!) 174/96   Pulse: 82 98   Patient Position - Orthostatic VS: Sitting Lying         Visual Acuity      ED Medications  Medications   sodium chloride 0.9 % bolus 1,000 mL (0 mL Intravenous Stopped 4/19/24 1452)   metoclopramide (REGLAN) injection 10 mg (10 mg Intravenous Given 4/19/24 1353)   diphenhydrAMINE (BENADRYL) injection 25 mg (25 mg Intravenous Given 4/19/24 1352)       Diagnostic Studies  Results Reviewed       Procedure Component Value Units Date/Time    Comprehensive metabolic panel [[id number]]  (Abnormal) Collected: 04/19/24 1350    Lab Status: Final result Specimen: Blood from Arm, Right Updated: 04/19/24 1417     Sodium 137 mmol/L      Potassium 4.1 mmol/L      Chloride 104 mmol/L      CO2 25 mmol/L      ANION GAP 8 mmol/L      BUN 11 mg/dL      Creatinine 0.77 mg/dL      Glucose 82 mg/dL      Calcium 8.8 mg/dL      AST 18 U/L      ALT 14 U/L      Alkaline Phosphatase 113 U/L      Total Protein 8.3 g/dL      Albumin 4.3 g/dL      Total Bilirubin 0.72 mg/dL      eGFR 90 ml/min/1.73sq m     Narrative:      National Kidney Disease  Foundation guidelines for Chronic Kidney Disease (CKD):     Stage 1 with normal or high GFR (GFR > 90 mL/min/1.73 square meters)    Stage 2 Mild CKD (GFR = 60-89 mL/min/1.73 square meters)    Stage 3A Moderate CKD (GFR = 45-59 mL/min/1.73 square meters)    Stage 3B Moderate CKD (GFR = 30-44 mL/min/1.73 square meters)    Stage 4 Severe CKD (GFR = 15-29 mL/min/1.73 square meters)    Stage 5 End Stage CKD (GFR <15 mL/min/1.73 square meters)  Note: GFR calculation is accurate only with a steady state creatinine    Lipase [[id number]]  (Normal) Collected: 04/19/24 1350    Lab Status: Final result Specimen: Blood from Arm, Right Updated: 04/19/24 1417     Lipase 50 u/L     Magnesium [[id number]]  (Normal) Collected: 04/19/24 1350    Lab Status: Final result Specimen: Blood from Arm, Right Updated: 04/19/24 1417     Magnesium 2.1 mg/dL     CBC and differential [[id number]]  (Abnormal) Collected: 04/19/24 1350    Lab Status: Final result Specimen: Blood from Arm, Right Updated: 04/19/24 1403     WBC 14.99 Thousand/uL      RBC 4.66 Million/uL      Hemoglobin 12.6 g/dL      Hematocrit 38.6 %      MCV 83 fL      MCH 27.0 pg      MCHC 32.6 g/dL      RDW 15.0 %      MPV 11.1 fL      Platelets 417 Thousands/uL      nRBC 0 /100 WBCs      Segmented % 77 %      Immature Grans % 0 %      Lymphocytes % 18 %      Monocytes % 5 %      Eosinophils Relative 0 %      Basophils Relative 0 %      Absolute Neutrophils 11.40 Thousands/µL      Absolute Immature Grans 0.06 Thousand/uL      Absolute Lymphocytes 2.65 Thousands/µL      Absolute Monocytes 0.78 Thousand/µL      Eosinophils Absolute 0.04 Thousand/µL      Basophils Absolute 0.06 Thousands/µL     Fingerstick Glucose (POCT) [[id number]]  (Normal) Collected: 04/19/24 1221    Lab Status: Final result Specimen: Blood Updated: 04/19/24 1223     POC Glucose 88 mg/dl                    No orders to display              Procedures  ECG 12 Lead Documentation Only    Date/Time: 4/19/2024 5:27  PM    Performed by: Liyah Galeana PA-C  Authorized by: Liyah Galeana PA-C    Indications / Diagnosis:  Vomiting  ECG reviewed by me, the ED Provider: yes    Patient location:  ED  Rate:     ECG rate:  73    ECG rate assessment: normal    Rhythm:     Rhythm: sinus rhythm    Ectopy:     Ectopy: none    QRS:     QRS axis:  Normal  Conduction:     Conduction: normal    ST segments:     ST segments:  Non-specific  T waves:     T waves: normal             ED Course  ED Course as of 04/20/24 1555   Fri Apr 19, 2024   1452 Patient sleeping on reassessment.                SBIRT 20yo+      Flowsheet Row Most Recent Value   Initial Alcohol Screen: US AUDIT-C     1. How often do you have a drink containing alcohol? 0 Filed at: 04/19/2024 1222   2. How many drinks containing alcohol do you have on a typical day you are drinking?  0 Filed at: 04/19/2024 1222   3b. FEMALE Any Age, or MALE 65+: How often do you have 4 or more drinks on one occassion? 0 Filed at: 04/19/2024 1222   Audit-C Score 0 Filed at: 04/19/2024 1222   POLA: How many times in the past year have you...    Used an illegal drug or used a prescription medication for non-medical reasons? Never Filed at: 04/19/2024 1222                      Medical Decision Making  48yoF here with nausea and vomiting after her first dose of Ozempic last night. She accidentally gave herself 2 doses totaling 4mg. Sent here by urgent care. She is hypertensive with otherwise normal vitals. She appears uncomfortable but is non-toxic. Abdominal exam is benign.    Initial ED plan: Check abdominal labs and EKG. IV Reglan, Benadryl, and fluid bolus for symptoms.    Final assessment: Labs reveal a leukocytosis with a WBC of 14.9 which is likely reactive 2/2 vomiting. Electrolytes, renal function, lipase normal. Patient feeling significantly improved on reassessment and was able to tolerate PO challenge. She is stable for discharge. Script provided for Reglan and supportive care  discussed. Advised close PCP follow-up. ED return precautions discussed. Patient expressed understanding and is agreeable to plan. Patient discharged in stable condition.        Problems Addressed:  Nausea and vomiting: acute illness or injury    Amount and/or Complexity of Data Reviewed  Labs: ordered.  ECG/medicine tests: ordered and independent interpretation performed.    Risk  Prescription drug management.             Disposition  Final diagnoses:   Nausea and vomiting     Time reflects when diagnosis was documented in both MDM as applicable and the Disposition within this note       Time User Action Codes Description Comment    4/19/2024  4:56 PM Liyah Galeana Add [R11.2] Nausea and vomiting           ED Disposition       ED Disposition   Discharge    Condition   Stable    Date/Time   Fri Apr 19, 2024 1656    Comment   Jessy Alex discharge to home/self care.                   Follow-up Information       Follow up With Specialties Details Why Contact Info Additional Information    [facility], Nurse Practitioner Schedule an appointment as soon as possible for a visit   [address] [place] [id number]  [phone]       [facility] Emergency Department Emergency Medicine  If symptoms worsen [address] [facility] [id number] [phone] [facility] Emergency Department, [address] [place], [place], [address]            Discharge Medication List as of 4/19/2024  4:57 PM        START taking these medications    Details   metoclopramide (Reglan) 10 mg tablet Take 1 tablet (10 mg total) by mouth every 6 (six) hours as needed (nausea, vomiting), Starting Fri 4/19/2024, Normal           CONTINUE these medications which have NOT CHANGED    Details   aspirin (ECOTRIN LOW STRENGTH) 81 mg EC tablet Take 1 tablet (81 mg total) by mouth every 12 (twelve) hours, Starting Thu 12/29/2022, Until Thu 2/9/2023, Normal       !! Diclofenac Sodium (VOLTAREN) 1 % Apply 2 g topically 4 (four) times a day, Starting Wed 4/12/2023, Normal      !! Diclofenac Sodium (VOLTAREN) 1 % Apply 2 g topically 4 (four) times a day as needed (ankle pain), Starting Tue 2/6/2024, Normal      Iron-Vitamin C (Vitron-C)  MG TABS Take 1 tablet by mouth 2 (two) times a day, Starting Tue 1/11/2022, Normal      levothyroxine (Levo-T) 75 mcg tablet Take 1 tablet (75 mcg total) by mouth daily in the early morning, Starting Fri 5/5/2023, Normal      Mounjaro 7.5 MG/0.5ML Historical Med      multivitamin (THERAGRAN) TABS Take 1 tablet by mouth daily, Historical Med      Ozempic, 2 MG/DOSE, 8 MG/3ML injection pen inject 2 milligram subcutaneously weekly, Historical Med      sertraline (ZOLOFT) 50 mg tablet take 1 tablet by mouth once daily, Starting Thu 8/17/2023, Normal      VITAMIN D PO Take by mouth, Historical Med       !! - Potential duplicate medications found. Please discuss with provider.          No discharge procedures on file.    PDMP Review         Value Time User    PDMP Reviewed  Yes 12/29/2022  3:23 PM Crispin Lund PA-C            ED Provider  Electronically Signed by             Liyah Galeana PA-C  04/20/24 [id number]

## 2024-04-19 NOTE — PATIENT INSTRUCTIONS
Follow up with PCP in 3-5 days.  Proceed to  ER if symptoms worsen.    If tests are performed, our office will contact you with results only if changes need to made to the care plan discussed with you at the visit. You can review your full results on [facility] Mychart. Acute Nausea and Vomiting   WHAT YOU NEED TO KNOW:   Acute means the nausea and vomiting starts suddenly, gets worse quickly, and lasts a short time. There are many possible causes of acute nausea and vomiting.  DISCHARGE INSTRUCTIONS:   Call your local emergency number (911 in the [place]) if:   You have chest pain.    You have severe pain or cramping in your abdomen.    Your vision is blurred.    You are confused, have a high fever, or a stiff neck.    You have bright red blood coming from your rectum.    Your vomit smells like bowel movement.    Return to the emergency department if:   You have a severe headache or pain.    You are dizzy, cold, and thirsty, and your eyes and mouth are dry.    You are urinating very little or not at all.    You are dizzy or lightheaded when you stand up.    You see blood or material that looks like coffee grounds in your vomit.    Call your doctor if:   You continue to vomit for more than 48 hours.    Your nausea and vomiting does not get better or go away after you use medicine.    You have questions or concerns about your condition or care.    Medicines:  You may need any of the following:  Medicines  may be given to calm your stomach and stop your vomiting. You may also need medicines to help empty your stomach and bowels.    Take your medicine as directed.  Contact your healthcare provider if you think your medicine is not helping or if you have side effects. Tell your provider if you are allergic to any medicine. Keep a list of the medicines, vitamins, and herbs you take. Include the amounts, and when and why you take them. Bring the list or the pill bottles to follow-up visits. Carry your medicine list with you in  case of an emergency.    Manage your symptoms:   Rest as much as you can.  Too much activity can make your nausea worse.    Drink more liquids to prevent dehydration.  Take small sips. Try drinks such as ginger ale, lemonade, water, or tea. Your provider may recommend that you drink an oral rehydration solution (ORS). ORS contains water, salts, and sugar that are needed to replace the lost body fluids.    Eat smaller meals, more often.  Try bland foods and avoid spices or strong flavors    Do not drink alcohol.  Alcohol may upset or irritate your stomach.    Follow up with your doctor as directed:  Write down your questions so you remember to ask them during your visits.  © Copyright [employer] 2023 Information is for End User's use only and may not be sold, redistributed or otherwise used for commercial purposes.  The above information is an  only. It is not intended as medical advice for individual conditions or treatments. Talk to your doctor, nurse or pharmacist before following any medical regimen to see if it is safe and effective for you.

## 2024-04-20 LAB
ATRIAL RATE: 73 BPM
P AXIS: 51 DEGREES
PR INTERVAL: 168 MS
QRS AXIS: 55 DEGREES
QRSD INTERVAL: 80 MS
QT INTERVAL: 378 MS
QTC INTERVAL: 416 MS
T WAVE AXIS: 8 DEGREES
VENTRICULAR RATE: 73 BPM

## 2024-04-20 PROCEDURE — 93010 ELECTROCARDIOGRAM REPORT: CPT | Performed by: INTERNAL MEDICINE

## 2024-06-11 ENCOUNTER — TELEPHONE (OUTPATIENT)
Dept: FAMILY MEDICINE CLINIC | Facility: CLINIC | Age: 49
End: 2024-06-11

## 2024-06-11 NOTE — TELEPHONE ENCOUNTER
Left message for patient to bring a copy of insurance card into the office to scan into her chart or upload it when she e-checks in for her upcoming appointment.

## 2024-06-14 NOTE — PATIENT INSTRUCTIONS
Breast Self Exam for Women   AMBULATORY CARE:   A breast self-exam (BSE)  is a way to check your breasts for lumps and other changes. Regular BSEs can help you know how your breasts normally look and feel. Most breast lumps or changes are not cancer, but you should always have them checked by a healthcare provider.  Why you should do a BSE:  Breast cancer is the most common type of cancer in women. Even if you have mammograms, you may still want to do a BSE regularly. If you know how your breasts normally feel and look, it may help you know when to contact your healthcare provider. Mammograms can miss some cancers. You may find a lump during a BSE that did not show up on a mammogram.  When you should do a BSE:  If you have periods, you may want to do your BSE 1 week after your period ends. This is the time when your breasts may be the least swollen, lumpy, or tender. You can do regular BSEs even if you are breastfeeding or have breast implants.  Call your doctor if:   You find any lumps or changes in your breasts.    You have breast pain or fluid coming from your nipples.    You have questions or concerns about your condition or care.    How to do a BSE:       Look at your breasts in a mirror.  Look at the size and shape of each breast and nipple. Check for swelling, lumps, dimpling, scaly skin, or other skin changes. Look for nipple changes, such as a nipple that is painful or beginning to pull inward. Gently squeeze both nipples and check to see if fluid (that is not breast milk) comes out of them. If you find any of these or other breast changes, contact your healthcare provider. Check your breasts while you sit or  the following 3 positions:    Hang your arms down at your sides.    Raise your hands and join them behind your head.    Put firm pressure with your hands on your hips. Bend slightly forward while you look at your breasts in the mirror.    Lie down and feel your breasts.  When you lie down,  your breast tissue spreads out evenly over your chest. This makes it easier for you to feel for lumps and anything that may not be normal for your breasts. Do a BSE on one breast at a time.    Place a small pillow or towel under your left shoulder.  Put your left arm behind your head.    Use the 3 middle fingers of your right hand.  Use your fingertip pads, on the top of your fingers. Your fingertip pad is the most sensitive part of your finger.    Use small circles to feel your breast tissue.  Use your fingertip pads to make dime-sized, overlapping circles on your breast and armpits. Use light, medium, and firm pressure. First, press lightly. Second, press with medium pressure to feel a little deeper into the breast. Last, use firm pressure to feel deep within your breast.    Examine your entire breast area.  Examine the breast area from above the breast to below the breast where you feel only ribs. Make small circles with your fingertips, starting in the middle of your armpit. Make circles going up and down the breast area. Continue toward your breast and all the way across it. Examine the area from your armpit all the way over to the middle of your chest (breastbone). Stop at the middle of your chest.    Move the pillow or towel to your right shoulder, and put your right arm behind your head.  Use the 3 fingertip pads of your left hand, and repeat the above steps to do a BSE on your right breast.  What else you can do to check for breast problems or cancer:  Talk to your healthcare provider about mammograms. A mammogram is an x-ray of your breasts to screen for breast cancer or other problems. Your provider can tell you the benefits and risks of mammograms. The first mammogram is usually at age 45 or 50. Your provider may recommend you start at 40 or younger if your risk for breast cancer is high. Mammograms usually continue every 1 to 2 years until age 74.       Follow up with your doctor as directed:  Write  down your questions so you remember to ask them during your visits.  © Copyright [employer] 2023 Information is for End User's use only and may not be sold, redistributed or otherwise used for commercial purposes.  The above information is an  only. It is not intended as medical advice for individual conditions or treatments. Talk to your doctor, nurse or pharmacist before following any medical regimen to see if it is safe and effective for you.  Wellness Visit for Adults   AMBULATORY CARE:   A wellness visit  is when you see your healthcare provider to get screened for health problems. Your healthcare provider will also give you advice on how to stay healthy. Write down your questions so you remember to ask them. Ask your healthcare provider how often you should have a wellness visit.  What happens at a wellness visit:  Your healthcare provider will ask about your health, and your family history of health problems. This includes high blood pressure, heart disease, and cancer. He or she will ask if you have symptoms that concern you, if you smoke, and about your mood. You may also be asked about your intake of medicines, supplements, food, and alcohol. Any of the following may be done:  Your weight  will be checked. Your height may also be checked so your body mass index (BMI) can be calculated. Your BMI shows if you are at a healthy weight.    Your blood pressure  and heart rate will be checked. Your temperature may also be checked.    Blood and urine tests  may be done. Blood tests may be done to check your cholesterol levels. Abnormal cholesterol levels increase your risk for heart disease and stroke. You may also need a blood or urine test to check for diabetes if you are at increased risk. Urine tests may be done to look for signs of an infection or kidney disease.    A physical exam  includes checking your heartbeat and lungs with a stethoscope. Your healthcare provider may also check your skin to  look for sun damage.    Screening tests  may be recommended. A screening test is done to check for diseases that may not cause symptoms. The screening tests you may need depend on your age, gender, family history, and lifestyle habits. For example, colorectal screening may be recommended if you are 50 years old or older.    Screening tests you need if you are a woman:   A Pap smear  is used to screen for cervical cancer. Pap smears are usually done every 3 to 5 years depending on your age. You may need them more often if you have had abnormal Pap smear test results in the past. Ask your healthcare provider how often you should have a Pap smear.    A mammogram  is an x-ray of your breasts to screen for breast cancer. Experts recommend mammograms every 2 years starting at age 50 years. You may need a mammogram at age 49 years or younger if you have an increased risk for breast cancer. Talk to your healthcare provider about when you should start having mammograms and how often you need them.    Vaccines you may need:   Get an influenza vaccine  every year. The influenza vaccine protects you from the flu. Several types of viruses cause the flu. The viruses change over time, so new vaccines are made each year.    Get a tetanus-diphtheria (Td) booster vaccine  every 10 years. This vaccine protects you against tetanus and diphtheria. Tetanus is a severe infection that may cause painful muscle spasms and lockjaw. Diphtheria is a severe bacterial infection that causes a thick covering in the back of your mouth and throat.    Get a human papillomavirus (HPV) vaccine  if you are female and aged 19 to 26 or male 19 to 21 and never received it. This vaccine protects you from HPV infection. HPV is the most common infection spread by sexual contact. HPV may also cause vaginal, penile, and anal cancers.    Get a pneumococcal vaccine  if you are aged 65 years or older. The pneumococcal vaccine is an injection given to protect you  from pneumococcal disease. Pneumococcal disease is an infection caused by pneumococcal bacteria. The infection may cause pneumonia, meningitis, or an ear infection.    Get a shingles vaccine  if you are 60 or older, even if you have had shingles before. The shingles vaccine is an injection to protect you from the varicella-zoster virus. This is the same virus that causes chickenpox. Shingles is a painful rash that develops in people who had chickenpox or have been exposed to the virus.    How to eat healthy:  My Plate is a model for planning healthy meals. It shows the types and amounts of foods that should go on your plate. Fruits and vegetables make up about half of your plate, and grains and protein make up the other half. A serving of dairy is included on the side of your plate. The amount of calories and serving sizes you need depends on your age, gender, weight, and height. Examples of healthy foods are listed below:  Eat a variety of vegetables  such as dark green, red, and orange vegetables. You can also include canned vegetables low in sodium (salt) and frozen vegetables without added butter or sauces.    Eat a variety of fresh fruits , canned fruit in 100% juice, frozen fruit, and dried fruit.    Include whole grains.  At least half of the grains you eat should be whole grains. Examples include whole-wheat bread, wheat pasta, brown rice, and whole-grain cereals such as oatmeal.    Eat a variety of protein foods such as seafood (fish and shellfish), lean meat, and poultry without skin (turkey and chicken). Examples of lean meats include pork leg, shoulder, or tenderloin, and beef round, sirloin, tenderloin, and extra lean ground beef. Other protein foods include eggs and egg substitutes, beans, peas, soy products, nuts, and seeds.    Choose low-fat dairy products such as skim or 1% milk or low-fat yogurt, cheese, and cottage cheese.    Limit unhealthy fats  such as butter, hard margarine, and shortening.        Exercise:  Exercise at least 30 minutes per day on most days of the week. Some examples of exercise include walking, biking, dancing, and swimming. You can also fit in more physical activity by taking the stairs instead of the elevator or parking farther away from stores. Include muscle strengthening activities 2 days each week. Regular exercise provides many health benefits. It helps you manage your weight, and decreases your risk for type 2 diabetes, heart disease, stroke, and high blood pressure. Exercise can also help improve your mood. Ask your healthcare provider about the best exercise plan for you.       General health and safety guidelines:   Do not smoke.  Nicotine and other chemicals in cigarettes and cigars can cause lung damage. Ask your healthcare provider for information if you currently smoke and need help to quit. E-cigarettes or smokeless tobacco still contain nicotine. Talk to your healthcare provider before you use these products.    Limit alcohol.  A drink of alcohol is 12 ounces of beer, 5 ounces of wine, or 1½ ounces of liquor.    Lose weight, if needed.  Being overweight increases your risk of certain health conditions. These include heart disease, high blood pressure, type 2 diabetes, and certain types of cancer.    Protect your skin.  Do not sunbathe or use tanning beds. Use sunscreen with a SPF 15 or higher. Apply sunscreen at least 15 minutes before you go outside. Reapply sunscreen every 2 hours. Wear protective clothing, hats, and sunglasses when you are outside.    Drive safely.  Always wear your seatbelt. Make sure everyone in your car wears a seatbelt. A seatbelt can save your life if you are in an accident. Do not use your cell phone when you are driving. This could distract you and cause an accident. Pull over if you need to make a call or send a text message.    Practice safe sex.  Use latex condoms if are sexually active and have more than one partner. Your healthcare  provider may recommend screening tests for sexually transmitted infections (STIs).    Wear helmets, lifejackets, and protective gear.  Always wear a helmet when you ride a bike or motorcycle, go skiing, or play sports that could cause a head injury. Wear protective equipment when you play sports. Wear a lifejacket when you are on a boat or doing water sports.    © Copyright [employer] 2023 Information is for End User's use only and may not be sold, redistributed or otherwise used for commercial purposes.  The above information is an  only. It is not intended as medical advice for individual conditions or treatments. Talk to your doctor, nurse or pharmacist before following any medical regimen to see if it is safe and effective for you.  Kegel Exercises for Women   AMBULATORY CARE:   Kegel exercises  help strengthen your pelvic muscles. Pelvic muscles hold your pelvic organs, such as your bladder and uterus, in place. Kegel exercises help prevent or control certain conditions, such as urine incontinence (leakage) or uterine prolapse.       Call your doctor or physical therapist if:   You cannot feel your muscles tighten or relax.    You continue to leak urine.    You have questions or concerns about your condition or care.    Use the correct muscles:  Pelvic muscles are the muscles you use to control urine flow. To target these muscles, stop and start the flow of urine several times. This will help you become familiar with how it feels to tighten and relax these muscles.  How to do Kegel exercises:   Get into a comfortable position.  You may lie down, stand up, or sit down to do these exercises. When you first try to do these exercises, it may be easier if you lie down.    Tighten or squeeze your pelvic muscles slowly.  It may feel like you are trying to hold back urine or gas. Hold this position for 3 seconds. Relax for 3 seconds. Repeat this cycle 10 times. Do not hold your breath when you do Kegel  exercises. Keep your stomach, back, and leg muscles relaxed.    Do 10 sets of Kegel exercises, at least 3 times a day.  When you know how to do Kegel exercises, use different positions. This will help to strengthen your pelvic muscles as much as possible. You can do these exercises while you lie on the floor, watch TV, or while you stand. Tighten your pelvic muscles before you sneeze, cough, or lift to prevent urine leakage. You may notice improved bladder control within about 6 weeks.    Follow up with your doctor or physical therapist as directed:  Write down your questions so you remember to ask them during your visits.  © Copyright [employer] 2023 Information is for End User's use only and may not be sold, redistributed or otherwise used for commercial purposes.  The above information is an  only. It is not intended as medical advice for individual conditions or treatments. Talk to your doctor, nurse or pharmacist before following any medical regimen to see if it is safe and effective for you.       Perineal Hygiene      Your vaginal naturally takes care of its self, it is a self washing system, the less you mess the healthier it will be     No soaps or feminine wash to the vulva, these products can cause dermitis, bacterial infections and other vulvar problems.   Use only water to cleanse, or water with Dove or Dove Sensitive Skin Bar soap if necessary.    No scented lotions or products are advised in or near your vulva.    Use only coconut oil for moisture if needed.  No douching this may cause imbalance in your vaginal PH and further issues.    If you wear panty liners, you may apply a thin coating of Vaseline, A&D ointment or coconut oil to the vulvar tissues as a skin barrier     Cotton underware, loose fitting clothing  Only perfume-free, dye-free laundry detergent, use a second rinse cycle   Avoid fabric softeners/dryer sheets.       Your partner should avoid the same products as well.        Over the counter probiotic to restore vaginal yosi may be helpful as well, take daily.       You may also look into Boric Acid vaginal suppositories to restore vaginal PH balance for up to 2 weeks as directed on the box. You may not use these if you are pregnant      For vaginal dryness:      You may use:     Coconut oil (organic, pure, unscented) as needed for moisture or lubrication. ( Do not use if allergic)       Replens moisture restore external comfort gel daily ( use as directed on the box)        Replens long lasting vaginal moisturizer  ( use as directed on the box)         For Vaginal Lubrication:          You may use:     Coconut oil (organic, pure, unscented) as a lubricant or another scent-free lubricant (Astroglide, Uberlube) if needed.  Do not use coconut oil or silicone if using a condom as this may break down the integrity of the condom and cause an unplanned pregnancy              Do not use coconut oil if allergic               Replens silky smooth lubricant, premium silicone based lubricant for intercourse. ( use as directed, a small amount will provide an enhanced natural feeling)     Any premium over the counter vaginal lubricant water or silicone based. Silicone based will have more staying power.     Menopause   WHAT YOU NEED TO KNOW:   What is menopause?  Menopause is a normal stage in a person's life when monthly periods stop. You are considered to be in menopause when you have not had a period for a full year after the age of 40. Menopause usually occurs between ages 47 to 53. Perimenopause is a stage before menopause that may cause signs and symptoms similar to menopause. Perimenopause may start about 4 years before menopause.       What causes menopause?  Menopause starts when the ovaries stop making the female hormones estrogen and progesterone. After menopause, you are no longer able to become pregnant. Any of the following may trigger menopause or early menopause:  Surgery, including  a hysterectomy or oophorectomy    Family history of early menopause    Smoking    Chemotherapy or pelvic radiation    Chromosome abnormalities, including Tillman syndrome and Fragile X syndrome    Premature ovarian insufficiency (the ovaries stop producing eggs before age 40)    What are the signs and symptoms of menopause?   Irregular menstrual cycles with heavy vaginal bleeding followed by decreased bleeding until it stops    Hot flashes (feeling warm, flushed, and sweaty)    Vaginal changes such as increased dryness    Mood changes such as anxiety, depression, or decreased desire to have sex    Trouble sleeping, joint pain, headaches    Brittle nails, hair on chin or chest where it is normally absent    Decrease in breast size and change in skin texture    Weight gain    How is menopause treated or managed?   Hormone replacement therapy (HRT) is medicine that replaces your low hormone levels.  HRT contains estrogen and sometimes progestin.    HRT has several benefits.  HRT helps prevent osteoporosis, which decreases your risk for bone fractures. HRT also protects you from colorectal cancer.    HRT also has some risks.  HRT increases your risk for breast cancer, blood clots, heart disease, a heart attack, or a stroke. If you are 65 years or older, HRT can also increase your risk for dementia. Your risk for uterine or endometrial cancer, gallbladder disease, and urinary incontinence is higher if you take estrogen-only HRT.    Manage hot flashes.  Hot flashes are brief periods of feeling very warm, flushed, and sweaty. Hot flashes can last from a few seconds to several minutes. They may happen many times during the day, and are common at night. Layer your clothing so that you can easily remove some clothing and cool yourself during a hot flash. Cold drinks may also be helpful. Non-hormone medicines can help relieve or prevent hot flashes. Examples include certain antidepressants, nerve medicines, and high blood  pressure medicines.    Reduce vaginal dryness by using over-the-counter vaginal creams.  Vaginal dryness may cause you to have pain or discomfort during sex. Only use creams that are made for vaginal use. Do  not  use petroleum jelly. You may put an estrogen cream in and around your vagina. Estrogen cream may help decrease vaginal dryness and lower your risk of vaginal infections.    Continue to use birth control during perimenopause if you do not want to get pregnant.  You may need to use birth control until it has been 1 year since your periods stopped. Ask your healthcare provider when you can stop using birth control to prevent pregnancy.    How can I live a healthy lifestyle during and after menopause?  After menopause, your risk for heart disease and bone loss increases. Ask about these and other ways to stay healthy:  Exercise regularly.  Exercise helps you maintain a healthy weight. Exercise can also help to control your blood pressure and cholesterol levels. Include weight-bearing exercise for strong bones. Weight bearing exercise is recommended for at least 30 minutes, 3 times a week. Ask your healthcare provider about the best exercise plan for you.         Eat a variety of healthy foods.  Include fruits, vegetables, whole grains (whole-wheat bread, pasta, and cereals), low-fat dairy, and lean protein foods (beans, poultry, and fish). Limit foods high in sodium (salt). Ask your healthcare provider for more information about a meal plan that is right for you.         Do not smoke.  If you smoke, it is never too late to quit. You are more likely to have a heart attack, lung disease, blood clots, and cancer if you smoke. Ask your healthcare provider for information if you need help quitting.    Take supplements as directed.  You may need extra calcium and vitamin D to help prevent osteoporosis.            Limit alcohol and caffeine.  Alcohol and caffeine may worsen your symptoms.    When should I call my  doctor?   You have vaginal bleeding after menopause.    You have questions or concerns about your condition or care.    CARE AGREEMENT:   You have the right to help plan your care. Learn about your health condition and how it may be treated. Discuss treatment options with your healthcare providers to decide what care you want to receive. You always have the right to refuse treatment. The above information is an  only. It is not intended as medical advice for individual conditions or treatments. Talk to your doctor, nurse or pharmacist before following any medical regimen to see if it is safe and effective for you.  © Copyright [employer] 2023 Information is for End User's use only and may not be sold, redistributed or otherwise used for commercial purposes.

## 2024-06-14 NOTE — PROGRESS NOTES
Diagnoses and all orders for this visit:    Encounter for gynecological examination without abnormal finding    Encounter for screening mammogram for malignant neoplasm of breast  -     Mammo screening bilateral w 3d & cad; Future        Perineal hygiene reviewed   Weight bearing exercises minium of 150 mins/weekly advised.   Kegel exercises recommended daily, see AVS for instructions and recommendations  SBE encouraged, ASCCP guidelines reviewed. Condoms encouraged with all sexual activity to prevent STI's.   Gardisil vaccines recommended up to age 45  Calcium/ Vit D dietary requirements discussed,   Advised to call with any issues,  all concerns & questions addressed.   See after visit summary for further information and recommendations to the above mentioned subjects which we may or may not have covered in detail during your visit   F/U Annually and PRN      Health Maintenance:    Last PAP: 2022 Negative HPV Negative   Next PAP Due:2025    Last Mammogram: 2023    Life time Monse Pastrana % 5.11, Density A almost entirely fatty, Bi-Rads 2 Benign  Next Mammogram: order given    Last Colonoscopy: 2022    RTO in 10 years     Gardisil:  Not completed       Subjective    CC: Yearly Exam      Jessy Johnson is a 48 y.o. female here for an annual exam.   GYN hx includes:    x2, menorrhagia   Family hx of:  No GYN cancers  Medically stable, reports no changes in medical Hx, follows with PMD    Patient's last menstrual period was 2024 (exact date).  Her menstrual cycles are starting to skip months . She denies issues with bleeding during her menses. Historicaly heavy   Denies history of abnormal pap smear.  She denies breast concerns, abnormal vaginal discharge, vaginal itching, odor, irritation, bowel dysfunction, urinary symptoms, pelvic pain today.   Leaks urine occasionally. Reviewed Kegel's, declines PT referral   She is sexually active. Monogamous relationship.  Her  "current method of contraception includes  none. Denies any issues with her BCM.  She does not want STD testing today.  Denies intimate partner violence     ,   Children 23,21    Past Medical History:   Diagnosis Date    Allergic     Diabetes mellitus (HCC)     GERD (gastroesophageal reflux disease)     Hypertension     Sleep apnea     does not use cpap \"yet\"    Sleep apnea, obstructive     Varicella      Past Surgical History:   Procedure Laterality Date     SECTION      x2     IN OPEN TX DISTAL FIBULAR FRACTURE LAT MALLEOLUS Right 2022    Procedure: OPEN REDUCTION W/ INTERNAL FIXATION (ORIF) RIGHT ANKLE BIMALL EQUIV FRACTURE;  Surgeon: Jim Morse MD;  Location: [facility];  Service: Orthopedics       Immunization History   Administered Date(s) Administered    COVID-19 PFIZER VACCINE 0.3 ML IM 2021, 2021    COVID-19 Pfizer vac (Gary-sucrose, gray cap) 12 yr+ IM 2022       Family History   Problem Relation Age of Onset    Esophageal cancer Mother     Heart disease Father     No Known Problems Sister     No Known Problems Daughter     No Known Problems Maternal Grandmother     No Known Problems Maternal Grandfather     No Known Problems Paternal Grandmother     No Known Problems Paternal Grandfather     No Known Problems Sister     No Known Problems Brother     No Known Problems Brother     No Known Problems Son     No Known Problems Maternal Aunt     No Known Problems Paternal Aunt     No Known Problems Paternal Aunt     No Known Problems Paternal Aunt     No Known Problems Paternal Aunt     No Known Problems Paternal Aunt      Social History     Tobacco Use    Smoking status: Never    Smokeless tobacco: Never   Vaping Use    Vaping status: Never Used   Substance Use Topics    Alcohol use: Not Currently    Drug use: Never       Current Outpatient Medications:     metoclopramide (Reglan) 10 mg tablet, Take 1 tablet (10 mg total) by mouth every 6 (six) hours as " "needed (nausea, vomiting), Disp: 30 tablet, Rfl: 0    multivitamin (THERAGRAN) TABS, Take 1 tablet by mouth daily, Disp: , Rfl:     Ozempic, 2 MG/DOSE, 8 MG/3ML injection pen, inject 2 milligram subcutaneously weekly, Disp: , Rfl:     VITAMIN D PO, Take by mouth, Disp: , Rfl:     Mounjaro 7.5 MG/0.5ML, , Disp: , Rfl:   Patient Active Problem List    Diagnosis Date Noted    Posterior tibial tendonitis, right 2024    Peroneal tendonitis, right 2024    Achilles tendinitis of right lower extremity 2023    Aftercare following surgery 2023    Closed bimalleolar fracture of right ankle 01/10/2023    Closed fracture of right ankle 2022    Sinus congestion 11/15/2022    Family conflict 10/19/2022    Adjustment disorder with mixed anxiety and depressed mood 10/05/2022    COVID-19 2022    Acute nasopharyngitis 2022    Flu-like symptoms 2022    Morbid obesity (HCC) 2022    Chronic bilateral low back pain with sciatica 2022    Muscle spasms of both lower extremities 2022    LESTER (obstructive sleep apnea)     Snoring     Hypothyroidism 2022    Iron deficiency 2022    Nausea 2022    Anxiety and depression 2021    Fatigue 2021    Menorrhagia with irregular cycle 12/10/2020    Right foot pain 12/10/2020    Encounter to establish care 2020       No Known Allergies    OB History    Para Term  AB Living   2 2 2     2   SAB IAB Ectopic Multiple Live Births           2      # Outcome Date GA Lbr Db/2nd Weight Sex Type Anes PTL Lv   2 Term            1 Term               Obstetric Comments     2 x c- sections        Vitals:    24 1404   BP: 110/78   BP Location: Left arm   Patient Position: Sitting   Cuff Size: Large   Weight: 90.7 kg (200 lb)   Height: 5' 4\" (1.626 m)     Body mass index is 34.33 kg/m².    Review of Systems     Constitutional: Negative for chills, fatigue, fever, headaches, visual disturbances, and " unexpected weight change.   Respiratory: Negative for cough, & shortness of breath.  Cardiovascular: Negative for chest pain. .    Gastrointestinal: Negative for Abd pain, nausea & vomiting, constipation and diarrhea.   Genitourinary: Negative for difficulty urinating, dysuria, hematuria,  unusual vaginal bleeding or discharge  Skin: Negative skin changes    Physical Exam     Constitutional: Alert & Oriented x3, well-developed and well-nourished. No distress.   HENT: Atraumatic, Normocephalic, Conjunctivae clear  Neck: Normal range of motion. Neck supple. No thyromegaly, mass, nodules or tenderness  Pulmonary: Effort normal.   Abdominal: Soft. No tenderness or masses  Musculoskeletal: Normal ROM  Skin: Warm & Dry  Psychological: Normal mood, thought content, behavior & judgement     Breasts:   Right: tissue soft without masses, tenderness, skin changes or nipple discharge. No areas of erythema or pain. No subclavicular, axillary, pectoral adenopathy  Left:  tissue soft without masses, tenderness, skin changes or nipple discharge. No areas of erythema or pain. No subclavicular, axillary, pectoral adenopathy    Pelvic exam was performed with patient supine, lithotomy position.      Labia: Negative rash, tenderness, lesion or injury on the right labia.              Negative rash, tenderness, lesion or injury on the left labia.   Urethral meatus:  Negative for  tenderness, inflammation or discharge.   Uterus: not deviated, enlarged, fixed or tender.   Cervix: No CMT, no discharge or friability.   Right adnexa: no mass, no tenderness and no fullness.  Left adnexa: no mass, no tenderness and no fullness.   Vagina: No erythema, tenderness, masses, or foreign body in the vagina. No signs of injury around the vagina. No unusual vaginal discharge   Perineum without lesions, signs of injury, erythema or swelling.  Inguinal Canal:        Right: No inguinal adenopathy or hernia present.        Left: No inguinal adenopathy or  hernia present.

## 2024-06-18 ENCOUNTER — ANNUAL EXAM (OUTPATIENT)
Dept: OBGYN CLINIC | Facility: CLINIC | Age: 49
End: 2024-06-18
Payer: COMMERCIAL

## 2024-06-18 VITALS
DIASTOLIC BLOOD PRESSURE: 78 MMHG | BODY MASS INDEX: 34.15 KG/M2 | HEIGHT: 64 IN | SYSTOLIC BLOOD PRESSURE: 110 MMHG | WEIGHT: 200 LBS

## 2024-06-18 DIAGNOSIS — Z12.31 ENCOUNTER FOR SCREENING MAMMOGRAM FOR MALIGNANT NEOPLASM OF BREAST: ICD-10-CM

## 2024-06-18 DIAGNOSIS — Z01.419 ENCOUNTER FOR GYNECOLOGICAL EXAMINATION WITHOUT ABNORMAL FINDING: Primary | ICD-10-CM

## 2024-06-18 PROCEDURE — 99396 PREV VISIT EST AGE 40-64: CPT | Performed by: OBSTETRICS & GYNECOLOGY

## 2024-06-21 ENCOUNTER — OFFICE VISIT (OUTPATIENT)
Dept: FAMILY MEDICINE CLINIC | Facility: CLINIC | Age: 49
End: 2024-06-21
Payer: COMMERCIAL

## 2024-06-21 VITALS
WEIGHT: 201 LBS | SYSTOLIC BLOOD PRESSURE: 144 MMHG | OXYGEN SATURATION: 96 % | DIASTOLIC BLOOD PRESSURE: 80 MMHG | TEMPERATURE: 97.7 F | HEART RATE: 94 BPM | HEIGHT: 64 IN | RESPIRATION RATE: 14 BRPM | BODY MASS INDEX: 34.31 KG/M2

## 2024-06-21 DIAGNOSIS — Z00.00 ANNUAL PHYSICAL EXAM: Primary | ICD-10-CM

## 2024-06-21 DIAGNOSIS — Z12.31 ENCOUNTER FOR SCREENING MAMMOGRAM FOR MALIGNANT NEOPLASM OF BREAST: ICD-10-CM

## 2024-06-21 DIAGNOSIS — E55.9 HYPOVITAMINOSIS D: ICD-10-CM

## 2024-06-21 DIAGNOSIS — M54.40 ACUTE MIDLINE LOW BACK PAIN WITH SCIATICA, SCIATICA LATERALITY UNSPECIFIED: ICD-10-CM

## 2024-06-21 PROBLEM — F41.9 ANXIETY AND DEPRESSION: Status: RESOLVED | Noted: 2021-12-23 | Resolved: 2024-06-21

## 2024-06-21 PROBLEM — F32.A ANXIETY AND DEPRESSION: Status: RESOLVED | Noted: 2021-12-23 | Resolved: 2024-06-21

## 2024-06-21 PROBLEM — F43.23 ADJUSTMENT DISORDER WITH MIXED ANXIETY AND DEPRESSED MOOD: Status: RESOLVED | Noted: 2022-10-05 | Resolved: 2024-06-21

## 2024-06-21 PROCEDURE — 99396 PREV VISIT EST AGE 40-64: CPT | Performed by: NURSE PRACTITIONER

## 2024-06-21 RX ORDER — METHOCARBAMOL 500 MG/1
500 TABLET, FILM COATED ORAL 2 TIMES DAILY PRN
Qty: 90 TABLET | Refills: 0 | Status: SHIPPED | OUTPATIENT
Start: 2024-06-21

## 2024-06-21 NOTE — ASSESSMENT & PLAN NOTE
Physical completed.  Blood work ordered.  Mammogram ordered.  Up-to-date with Pap smear discussed self-care.  Recommend heart healthy diet

## 2024-06-21 NOTE — PROGRESS NOTES
Adult Annual Physical  Name: Jessy Johnson      : 1975      MRN: [id number]  Encounter Provider: SKYE Root  Encounter Date: 2024   Encounter department: [facility]    Assessment & Plan   1. Annual physical exam  Assessment & Plan:  Physical completed.  Blood work ordered.  Mammogram ordered.  Up-to-date with Pap smear discussed self-care.  Recommend heart healthy diet  Orders:  -     CBC and differential; Future  -     Comprehensive metabolic panel; Future  -     Lipid panel; Future  -     TSH, 3rd generation with Free T4 reflex; Future  2. Encounter for screening mammogram for malignant neoplasm of breast  -     Mammo screening bilateral w 3d & cad  3. Acute midline low back pain with sciatica, sciatica laterality unspecified  Assessment & Plan:  Patient has acute lower back pain.  Pain is increased with activity.  Has a history of degenerative disease.  May use heat muscle rub Robaxin for acute pain.  MRI ordered  Orders:  -     MRI lumbar spine w wo contrast; Future; Expected date: 2024  -     methocarbamol (ROBAXIN) 500 mg tablet; Take 1 tablet (500 mg total) by mouth 2 (two) times a day as needed for muscle spasms  4. Hypovitaminosis D  -     Vitamin D 25 hydroxy; Future    Immunizations and preventive care screenings were discussed with patient today. Appropriate education was printed on patient's after visit summary.    Counseling:  Alcohol/drug use: discussed moderation in alcohol intake, the recommendations for healthy alcohol use, and avoidance of illicit drug use.  Dental Health: discussed importance of regular tooth brushing, flossing, and dental visits.  Injury prevention: discussed safety/seat belts, safety helmets, smoke detectors, carbon dioxide detectors, and smoking near bedding or upholstery.  Sexual health: discussed sexually transmitted diseases, partner selection, use of condoms, avoidance of unintended pregnancy, and contraceptive  "alternatives.  Exercise: the importance of regular exercise/physical activity was discussed. Recommend exercise 3-5 times per week for at least 30 minutes.       Depression Screening and Follow-up Plan: Patient was screened for depression during today's encounter. They screened negative with a PHQ-9 score of 0.        History of Present Illness   {Disappearing Hyperlinks I Encounters * My Last Note * Since Last Visit * History :67834}  Adult Annual Physical:  Patient presents for annual physical.     Diet and Physical Activity:  - Diet/Nutrition: well balanced diet.  - Exercise: walking.    Depression Screening:    - PHQ-9 Score: 0    General Health:  - Sleep: sleeps well.  - Hearing: normal hearing bilateral ears.  - Vision: no vision problems and most recent eye exam > 1 year ago.  - Dental: brushes teeth twice daily.    /GYN Health:  - Follows with GYN: yes.   - Menopause: perimenopausal.   - History of STDs: no    Advanced Care Planning:  - Has an advanced directive?: no    - Has a durable medical POA?: no    - ACP document given to patient?: no      Review of Systems   Constitutional: Negative.    HENT: Negative.     Eyes: Negative.    Respiratory: Negative.     Cardiovascular: Negative.    Gastrointestinal: Negative.    Endocrine: Negative.    Genitourinary: Negative.    Musculoskeletal:  Positive for arthralgias and back pain.   Skin: Negative.    Allergic/Immunologic: Negative.    Neurological: Negative.    Psychiatric/Behavioral: Negative.           Objective   {Disappearing Hyperlinks   Review Vitals * Enter New Vitals * Results Review * Labs * Imaging * Cardiology * Procedures * Lung Cancer Screening :85421}  /80   Pulse 94   Temp 97.7 °F (36.5 °C) (Temporal)   Resp 14   Ht 5' 4\" (1.626 m)   Wt 91.2 kg (201 lb)   LMP 04/20/2024 (Exact Date) Comment: Skip May 2024  SpO2 96%   BMI 34.50 kg/m²     Physical Exam  Constitutional:       General: She is not in acute distress.     Appearance: " Normal appearance. She is obese. She is not ill-appearing.   HENT:      Head: Normocephalic and atraumatic.      Nose: Nose normal.      Mouth/Throat:      Mouth: Mucous membranes are moist.   Eyes:      Pupils: Pupils are equal, round, and reactive to light.   Cardiovascular:      Rate and Rhythm: Normal rate and regular rhythm.      Pulses: Normal pulses.   Pulmonary:      Effort: Pulmonary effort is normal. No respiratory distress.      Breath sounds: Normal breath sounds.   Chest:      Chest wall: No tenderness.   Abdominal:      General: Abdomen is flat. Bowel sounds are normal. There is no distension.      Palpations: There is no mass.      Tenderness: There is no abdominal tenderness.   Musculoskeletal:         General: Tenderness (Lower back) present. No deformity. Normal range of motion.      Cervical back: Normal range of motion and neck supple.   Skin:     General: Skin is warm and dry.   Neurological:      General: No focal deficit present.      Mental Status: She is alert and oriented to person, place, and time.   Psychiatric:         Mood and Affect: Mood normal.         Behavior: Behavior normal.         Thought Content: Thought content normal.         Judgment: Judgment normal.     Depression Screening Follow-up Plan: Patient's depression screening was positive with a PHQ-2 score of . Their PHQ-9 score was 0. Clinically patient does not have depression. No treatment is required.    Administrative Statements {Disappearing Hyperlinks I  [id number]}

## 2024-06-21 NOTE — ASSESSMENT & PLAN NOTE
Patient has acute lower back pain.  Pain is increased with activity.  Has a history of degenerative disease.  May use heat muscle rub Robaxin for acute pain.  MRI ordered

## 2024-07-10 NOTE — PATIENT INSTRUCTIONS
Obtain fasting labs, nothing to eat after 8pm , may drink water.     Wellness Visit for Adults   AMBULATORY CARE:   A wellness visit  is when you see your healthcare provider to get screened for health problems. Your healthcare provider will also give you advice on how to stay healthy. Write down your questions so you remember to ask them. Ask your healthcare provider how often you should have a wellness visit.  What happens at a wellness visit:  Your healthcare provider will ask about your health, and your family history of health problems. This includes high blood pressure, heart disease, and cancer. He or she will ask if you have symptoms that concern you, if you smoke, and about your mood. You may also be asked about your intake of medicines, supplements, food, and alcohol. Any of the following may be done:  Your weight  will be checked. Your height may also be checked so your body mass index (BMI) can be calculated. Your BMI shows if you are at a healthy weight.    Your blood pressure  and heart rate will be checked. Your temperature may also be checked.    Blood and urine tests  may be done. Blood tests may be done to check your cholesterol levels. Abnormal cholesterol levels increase your risk for heart disease and stroke. You may also need a blood or urine test to check for diabetes if you are at increased risk. Urine tests may be done to look for signs of an infection or kidney disease.    A physical exam  includes checking your heartbeat and lungs with a stethoscope. Your healthcare provider may also check your skin to look for sun damage.    Screening tests  may be recommended. A screening test is done to check for diseases that may not cause symptoms. The screening tests you may need depend on your age, gender, family history, and lifestyle habits. For example, colorectal screening may be recommended if you are 50 years old or older.    Screening tests you need if you are a woman:   A Pap smear  is used  to screen for cervical cancer. Pap smears are usually done every 3 to 5 years depending on your age. You may need them more often if you have had abnormal Pap smear test results in the past. Ask your healthcare provider how often you should have a Pap smear.    A mammogram  is an x-ray of your breasts to screen for breast cancer. Experts recommend mammograms every 2 years starting at age 50 years. You may need a mammogram at age 49 years or younger if you have an increased risk for breast cancer. Talk to your healthcare provider about when you should start having mammograms and how often you need them.    Vaccines you may need:   Get an influenza vaccine  every year. The influenza vaccine protects you from the flu. Several types of viruses cause the flu. The viruses change over time, so new vaccines are made each year.    Get a tetanus-diphtheria (Td) booster vaccine  every 10 years. This vaccine protects you against tetanus and diphtheria. Tetanus is a severe infection that may cause painful muscle spasms and lockjaw. Diphtheria is a severe bacterial infection that causes a thick covering in the back of your mouth and throat.    Get a human papillomavirus (HPV) vaccine  if you are female and aged 19 to 26 or male 19 to 21 and never received it. This vaccine protects you from HPV infection. HPV is the most common infection spread by sexual contact. HPV may also cause vaginal, penile, and anal cancers.    Get a pneumococcal vaccine  if you are aged 65 years or older. The pneumococcal vaccine is an injection given to protect you from pneumococcal disease. Pneumococcal disease is an infection caused by pneumococcal bacteria. The infection may cause pneumonia, meningitis, or an ear infection.    Get a shingles vaccine  if you are 60 or older, even if you have had shingles before. The shingles vaccine is an injection to protect you from the varicella-zoster virus. This is the same virus that causes chickenpox. Shingles  is a painful rash that develops in people who had chickenpox or have been exposed to the virus.    How to eat healthy:  My Plate is a model for planning healthy meals. It shows the types and amounts of foods that should go on your plate. Fruits and vegetables make up about half of your plate, and grains and protein make up the other half. A serving of dairy is included on the side of your plate. The amount of calories and serving sizes you need depends on your age, gender, weight, and height. Examples of healthy foods are listed below:  Eat a variety of vegetables  such as dark green, red, and orange vegetables. You can also include canned vegetables low in sodium (salt) and frozen vegetables without added butter or sauces.    Eat a variety of fresh fruits , canned fruit in 100% juice, frozen fruit, and dried fruit.    Include whole grains.  At least half of the grains you eat should be whole grains. Examples include whole-wheat bread, wheat pasta, brown rice, and whole-grain cereals such as oatmeal.    Eat a variety of protein foods such as seafood (fish and shellfish), lean meat, and poultry without skin (turkey and chicken). Examples of lean meats include pork leg, shoulder, or tenderloin, and beef round, sirloin, tenderloin, and extra lean ground beef. Other protein foods include eggs and egg substitutes, beans, peas, soy products, nuts, and seeds.    Choose low-fat dairy products such as skim or 1% milk or low-fat yogurt, cheese, and cottage cheese.    Limit unhealthy fats  such as butter, hard margarine, and shortening.       Exercise:  Exercise at least 30 minutes per day on most days of the week. Some examples of exercise include walking, biking, dancing, and swimming. You can also fit in more physical activity by taking the stairs instead of the elevator or parking farther away from stores. Include muscle strengthening activities 2 days each week. Regular exercise provides many health benefits. It helps you  manage your weight, and decreases your risk for type 2 diabetes, heart disease, stroke, and high blood pressure. Exercise can also help improve your mood. Ask your healthcare provider about the best exercise plan for you.       General health and safety guidelines:   Do not smoke.  Nicotine and other chemicals in cigarettes and cigars can cause lung damage. Ask your healthcare provider for information if you currently smoke and need help to quit. E-cigarettes or smokeless tobacco still contain nicotine. Talk to your healthcare provider before you use these products.    Limit alcohol.  A drink of alcohol is 12 ounces of beer, 5 ounces of wine, or 1½ ounces of liquor.    Lose weight, if needed.  Being overweight increases your risk of certain health conditions. These include heart disease, high blood pressure, type 2 diabetes, and certain types of cancer.    Protect your skin.  Do not sunbathe or use tanning beds. Use sunscreen with a SPF 15 or higher. Apply sunscreen at least 15 minutes before you go outside. Reapply sunscreen every 2 hours. Wear protective clothing, hats, and sunglasses when you are outside.    Drive safely.  Always wear your seatbelt. Make sure everyone in your car wears a seatbelt. A seatbelt can save your life if you are in an accident. Do not use your cell phone when you are driving. This could distract you and cause an accident. Pull over if you need to make a call or send a text message.    Practice safe sex.  Use latex condoms if are sexually active and have more than one partner. Your healthcare provider may recommend screening tests for sexually transmitted infections (STIs).    Wear helmets, lifejackets, and protective gear.  Always wear a helmet when you ride a bike or motorcycle, go skiing, or play sports that could cause a head injury. Wear protective equipment when you play sports. Wear a lifejacket when you are on a boat or doing water sports.    © Copyright [employer] 2023 Information  is for End User's use only and may not be sold, redistributed or otherwise used for commercial purposes.  The above information is an  only. It is not intended as medical advice for individual conditions or treatments. Talk to your doctor, nurse or pharmacist before following any medical regimen to see if it is safe and effective for you.     normal latch

## 2024-07-12 ENCOUNTER — APPOINTMENT (OUTPATIENT)
Dept: LAB | Facility: CLINIC | Age: 49
End: 2024-07-12
Payer: COMMERCIAL

## 2024-07-12 ENCOUNTER — TELEPHONE (OUTPATIENT)
Dept: FAMILY MEDICINE CLINIC | Facility: CLINIC | Age: 49
End: 2024-07-12

## 2024-07-12 DIAGNOSIS — F40.240 CLAUSTROPHOBIA: ICD-10-CM

## 2024-07-12 DIAGNOSIS — Z00.00 ANNUAL PHYSICAL EXAM: ICD-10-CM

## 2024-07-12 DIAGNOSIS — F41.8 SITUATIONAL ANXIETY: Primary | ICD-10-CM

## 2024-07-12 DIAGNOSIS — E55.9 HYPOVITAMINOSIS D: ICD-10-CM

## 2024-07-12 LAB
25(OH)D3 SERPL-MCNC: 26.6 NG/ML (ref 30–100)
ALBUMIN SERPL BCG-MCNC: 3.7 G/DL (ref 3.5–5)
ALP SERPL-CCNC: 95 U/L (ref 34–104)
ALT SERPL W P-5'-P-CCNC: 14 U/L (ref 7–52)
ANION GAP SERPL CALCULATED.3IONS-SCNC: 5 MMOL/L (ref 4–13)
AST SERPL W P-5'-P-CCNC: 22 U/L (ref 13–39)
BASOPHILS # BLD AUTO: 0.06 THOUSANDS/ÂΜL (ref 0–0.1)
BASOPHILS NFR BLD AUTO: 1 % (ref 0–1)
BILIRUB SERPL-MCNC: 0.66 MG/DL (ref 0.2–1)
BUN SERPL-MCNC: 8 MG/DL (ref 5–25)
CALCIUM SERPL-MCNC: 8.8 MG/DL (ref 8.4–10.2)
CHLORIDE SERPL-SCNC: 107 MMOL/L (ref 96–108)
CHOLEST SERPL-MCNC: 138 MG/DL
CO2 SERPL-SCNC: 28 MMOL/L (ref 21–32)
CREAT SERPL-MCNC: 0.78 MG/DL (ref 0.6–1.3)
EOSINOPHIL # BLD AUTO: 0.32 THOUSAND/ÂΜL (ref 0–0.61)
EOSINOPHIL NFR BLD AUTO: 5 % (ref 0–6)
ERYTHROCYTE [DISTWIDTH] IN BLOOD BY AUTOMATED COUNT: 15.7 % (ref 11.6–15.1)
GFR SERPL CREATININE-BSD FRML MDRD: 89 ML/MIN/1.73SQ M
GLUCOSE P FAST SERPL-MCNC: 86 MG/DL (ref 65–99)
HCT VFR BLD AUTO: 40.4 % (ref 34.8–46.1)
HDLC SERPL-MCNC: 41 MG/DL
HGB BLD-MCNC: 12.4 G/DL (ref 11.5–15.4)
IMM GRANULOCYTES # BLD AUTO: 0.02 THOUSAND/UL (ref 0–0.2)
IMM GRANULOCYTES NFR BLD AUTO: 0 % (ref 0–2)
LDLC SERPL CALC-MCNC: 82 MG/DL (ref 0–100)
LYMPHOCYTES # BLD AUTO: 1.49 THOUSANDS/ÂΜL (ref 0.6–4.47)
LYMPHOCYTES NFR BLD AUTO: 24 % (ref 14–44)
MCH RBC QN AUTO: 26.3 PG (ref 26.8–34.3)
MCHC RBC AUTO-ENTMCNC: 30.7 G/DL (ref 31.4–37.4)
MCV RBC AUTO: 86 FL (ref 82–98)
MONOCYTES # BLD AUTO: 0.74 THOUSAND/ÂΜL (ref 0.17–1.22)
MONOCYTES NFR BLD AUTO: 12 % (ref 4–12)
NEUTROPHILS # BLD AUTO: 3.71 THOUSANDS/ÂΜL (ref 1.85–7.62)
NEUTS SEG NFR BLD AUTO: 58 % (ref 43–75)
NONHDLC SERPL-MCNC: 97 MG/DL
NRBC BLD AUTO-RTO: 0 /100 WBCS
PLATELET # BLD AUTO: 291 THOUSANDS/UL (ref 149–390)
PMV BLD AUTO: 12.8 FL (ref 8.9–12.7)
POTASSIUM SERPL-SCNC: 4.3 MMOL/L (ref 3.5–5.3)
PROT SERPL-MCNC: 7.4 G/DL (ref 6.4–8.4)
RBC # BLD AUTO: 4.71 MILLION/UL (ref 3.81–5.12)
SODIUM SERPL-SCNC: 140 MMOL/L (ref 135–147)
TRIGL SERPL-MCNC: 76 MG/DL
TSH SERPL DL<=0.05 MIU/L-ACNC: 4.42 UIU/ML (ref 0.45–4.5)
WBC # BLD AUTO: 6.34 THOUSAND/UL (ref 4.31–10.16)

## 2024-07-12 PROCEDURE — 85025 COMPLETE CBC W/AUTO DIFF WBC: CPT

## 2024-07-12 PROCEDURE — 84443 ASSAY THYROID STIM HORMONE: CPT

## 2024-07-12 PROCEDURE — 36415 COLL VENOUS BLD VENIPUNCTURE: CPT

## 2024-07-12 PROCEDURE — 82306 VITAMIN D 25 HYDROXY: CPT

## 2024-07-12 PROCEDURE — 80061 LIPID PANEL: CPT

## 2024-07-12 PROCEDURE — 80053 COMPREHEN METABOLIC PANEL: CPT

## 2024-07-12 RX ORDER — LORAZEPAM 0.5 MG/1
0.5 TABLET ORAL ONCE AS NEEDED
Qty: 3 TABLET | Refills: 0 | Status: SHIPPED | OUTPATIENT
Start: 2024-07-12

## 2024-07-12 NOTE — TELEPHONE ENCOUNTER
The patient has scheduled an MRI for next week. She has informed us that she struggles with claustrophobia and is seeking a prescription to help her relax during the procedure. Please advise, Thank you

## 2024-07-16 ENCOUNTER — HOSPITAL ENCOUNTER (OUTPATIENT)
Dept: MAMMOGRAPHY | Facility: CLINIC | Age: 49
Discharge: HOME | End: 2024-07-16

## 2024-07-16 VITALS — BODY MASS INDEX: 34.31 KG/M2 | HEIGHT: 64 IN | WEIGHT: 201 LBS

## 2024-07-16 PROCEDURE — 77067 SCR MAMMO BI INCL CAD: CPT

## 2024-07-16 PROCEDURE — 77063 BREAST TOMOSYNTHESIS BI: CPT

## 2024-07-19 ENCOUNTER — HOSPITAL ENCOUNTER (OUTPATIENT)
Dept: MRI IMAGING | Facility: HOSPITAL | Age: 49
Discharge: HOME | End: 2024-07-19

## 2024-07-19 DIAGNOSIS — M54.40 ACUTE MIDLINE LOW BACK PAIN WITH SCIATICA, SCIATICA LATERALITY UNSPECIFIED: ICD-10-CM

## 2024-07-29 ENCOUNTER — TELEPHONE (OUTPATIENT)
Age: 49
End: 2024-07-29

## 2024-07-29 NOTE — TELEPHONE ENCOUNTER
"Patient states she was unable to have her MRI done as she \"freaked out\". She is wondering if there is a way for her to be put to sleep to get it done, or an alternate recommendation. Patient requests call back to further advise.   "

## 2024-07-31 ENCOUNTER — TELEPHONE (OUTPATIENT)
Dept: FAMILY MEDICINE CLINIC | Facility: CLINIC | Age: 49
End: 2024-07-31

## 2024-07-31 NOTE — TELEPHONE ENCOUNTER
patient indicated that she would like to be asleep during the MRI. Please reorder the referral. Thank you.

## 2024-08-07 DIAGNOSIS — M54.40 ACUTE MIDLINE LOW BACK PAIN WITH SCIATICA, SCIATICA LATERALITY UNSPECIFIED: Primary | ICD-10-CM

## 2024-08-22 ENCOUNTER — HOSPITAL ENCOUNTER (OUTPATIENT)
Dept: ULTRASOUND IMAGING | Facility: CLINIC | Age: 49
End: 2024-08-22
Payer: COMMERCIAL

## 2024-08-22 ENCOUNTER — HOSPITAL ENCOUNTER (OUTPATIENT)
Dept: MAMMOGRAPHY | Facility: CLINIC | Age: 49
End: 2024-08-22
Payer: COMMERCIAL

## 2024-08-22 VITALS — BODY MASS INDEX: 34.31 KG/M2 | HEIGHT: 64 IN | WEIGHT: 201 LBS

## 2024-08-22 DIAGNOSIS — R92.8 ABNORMAL MAMMOGRAM: ICD-10-CM

## 2024-08-22 PROCEDURE — 77065 DX MAMMO INCL CAD UNI: CPT

## 2024-08-22 PROCEDURE — 76642 ULTRASOUND BREAST LIMITED: CPT

## 2024-08-22 PROCEDURE — G0279 TOMOSYNTHESIS, MAMMO: HCPCS

## 2024-08-27 DIAGNOSIS — F41.8 SITUATIONAL ANXIETY: ICD-10-CM

## 2024-08-27 DIAGNOSIS — F40.240 CLAUSTROPHOBIA: ICD-10-CM

## 2024-08-27 RX ORDER — LORAZEPAM 0.5 MG/1
0.5 TABLET ORAL ONCE AS NEEDED
Qty: 3 TABLET | Refills: 0 | Status: SHIPPED | OUTPATIENT
Start: 2024-08-27

## 2024-08-28 ENCOUNTER — HOSPITAL ENCOUNTER (OUTPATIENT)
Dept: MRI IMAGING | Facility: HOSPITAL | Age: 49
Discharge: HOME | End: 2024-08-28
Payer: COMMERCIAL

## 2024-08-28 ENCOUNTER — TELEPHONE (OUTPATIENT)
Dept: FAMILY MEDICINE CLINIC | Facility: CLINIC | Age: 49
End: 2024-08-28

## 2024-08-28 DIAGNOSIS — M54.40 ACUTE MIDLINE LOW BACK PAIN WITH SCIATICA, SCIATICA LATERALITY UNSPECIFIED: ICD-10-CM

## 2024-08-28 PROCEDURE — 72158 MRI LUMBAR SPINE W/O & W/DYE: CPT

## 2024-08-28 PROCEDURE — A9585 GADOBUTROL INJECTION: HCPCS | Performed by: NURSE PRACTITIONER

## 2024-08-28 RX ORDER — GADOBUTROL 604.72 MG/ML
9 INJECTION INTRAVENOUS
Status: COMPLETED | OUTPATIENT
Start: 2024-08-28 | End: 2024-08-28

## 2024-08-28 RX ADMIN — GADOBUTROL 9 ML: 604.72 INJECTION INTRAVENOUS at 08:31

## 2024-08-28 NOTE — TELEPHONE ENCOUNTER
----- Message from SKYE Root sent at 8/28/2024  9:02 AM EDT -----  Reviewed results.  Please advise patient to keep appointment for ultrasound and mammogram in 6 months.

## 2024-08-30 ENCOUNTER — TELEPHONE (OUTPATIENT)
Dept: FAMILY MEDICINE CLINIC | Facility: CLINIC | Age: 49
End: 2024-08-30

## 2024-08-30 NOTE — TELEPHONE ENCOUNTER
----- Message from SKYE Root sent at 8/30/2024  7:14 AM EDT -----  Pt has Mild degenerative changes  Referral can be placed to physical therapy, my use heat, muscle rub, stretches, maintain safety

## 2025-01-24 ENCOUNTER — TELEPHONE (OUTPATIENT)
Dept: OBGYN CLINIC | Facility: CLINIC | Age: 50
End: 2025-01-24

## 2025-01-28 ENCOUNTER — TELEPHONE (OUTPATIENT)
Age: 50
End: 2025-01-28

## 2025-01-28 DIAGNOSIS — Z12.31 ENCOUNTER FOR SCREENING MAMMOGRAM FOR BREAST CANCER: Primary | ICD-10-CM

## 2025-01-28 NOTE — TELEPHONE ENCOUNTER
Patient called in requesting an order for a mammogram be placed. Patient received a notification stating she is due for a mammogram follow up. Please advise.

## 2025-01-30 DIAGNOSIS — Z87.81 STATUS POST OPEN REDUCTION AND INTERNAL FIXATION (ORIF) OF FRACTURE: Primary | ICD-10-CM

## 2025-01-30 DIAGNOSIS — Z98.890 STATUS POST OPEN REDUCTION AND INTERNAL FIXATION (ORIF) OF FRACTURE: Primary | ICD-10-CM

## 2025-02-05 ENCOUNTER — TELEPHONE (OUTPATIENT)
Dept: OBGYN CLINIC | Facility: CLINIC | Age: 50
End: 2025-02-05

## 2025-02-05 NOTE — TELEPHONE ENCOUNTER
Spoke to patient moved appointment time that was at 8:00 am on 02/06/25 new appointment time is at 10:45 am on 02/06/25 with Dr. Morse

## 2025-02-11 ENCOUNTER — OFFICE VISIT (OUTPATIENT)
Dept: OBGYN CLINIC | Facility: CLINIC | Age: 50
End: 2025-02-11
Payer: OTHER MISCELLANEOUS

## 2025-02-11 ENCOUNTER — APPOINTMENT (OUTPATIENT)
Dept: RADIOLOGY | Facility: CLINIC | Age: 50
End: 2025-02-11
Payer: COMMERCIAL

## 2025-02-11 VITALS — WEIGHT: 207.6 LBS | HEIGHT: 64 IN | BODY MASS INDEX: 35.44 KG/M2

## 2025-02-11 DIAGNOSIS — Z87.81 STATUS POST OPEN REDUCTION AND INTERNAL FIXATION (ORIF) OF FRACTURE: ICD-10-CM

## 2025-02-11 DIAGNOSIS — S82.841D CLOSED BIMALLEOLAR FRACTURE OF RIGHT ANKLE WITH ROUTINE HEALING, SUBSEQUENT ENCOUNTER: Primary | ICD-10-CM

## 2025-02-11 DIAGNOSIS — M76.61 ACHILLES TENDINITIS OF RIGHT LOWER EXTREMITY: ICD-10-CM

## 2025-02-11 DIAGNOSIS — M76.821 POSTERIOR TIBIAL TENDONITIS, RIGHT: Primary | ICD-10-CM

## 2025-02-11 DIAGNOSIS — Z98.890 STATUS POST OPEN REDUCTION AND INTERNAL FIXATION (ORIF) OF FRACTURE: ICD-10-CM

## 2025-02-11 DIAGNOSIS — M76.71 PERONEAL TENDONITIS, RIGHT: ICD-10-CM

## 2025-02-11 PROCEDURE — 73610 X-RAY EXAM OF ANKLE: CPT

## 2025-02-11 PROCEDURE — 99213 OFFICE O/P EST LOW 20 MIN: CPT | Performed by: ORTHOPAEDIC SURGERY

## 2025-02-11 RX ORDER — LORAZEPAM 1 MG/1
TABLET ORAL
Qty: 2 TABLET | Refills: 0 | Status: SHIPPED | OUTPATIENT
Start: 2025-02-11

## 2025-02-11 NOTE — PROGRESS NOTES
Name: Jessy Johnson      : 1975       MRN: [id number]   Encounter Provider: Jim Morse MD   Encounter Date: 25  Encounter department: [facility] [place]         Assessment & Plan  Closed bimalleolar fracture of right ankle with routine healing, subsequent encounter  Status post ORIF ankle, DOS 2022  Orders:    MRI ankle/heel right  wo contrast; Future    Peroneal tendonitis, right    Orders:    MRI ankle/heel right  wo contrast; Future    Achilles tendinitis of right lower extremity    Orders:    MRI ankle/heel right  wo contrast; Future    Ambulatory Referral to Podiatry; Future         Plan:   X-rays reviewed and discussed with patient  Patient to be full weightbearing to RLE (Right Lower Extremity)  ROM as tolerated to  RLE (Right Lower Extremity)  Discussed with patient an MRI study will be ordered for further evaluation. Patient advised to ask for a copy of her MRI be placed on a disc for future care.  Patient referred to out of network podiatrist due to lack of in-network providers in the area.   Patient to continue at home analgesic regimen with Tylenol and Ibuprofen as needed.   Patient to follow up as needed.        To Do Next Visit:  PRN     _____________________________________________________  CHIEF COMPLAINT:  Chief Complaint   Patient presents with    Right Ankle - Follow-up         SUBJECTIVE:  Jessy Johnson is a 49 y.o. female who presents 2 years status post ORIF right bimalleolar fracture performed on 2022. The patient is doing well overall. She notes she always has pain associated with her ankle. It its localized to the posterior and lateral ankle. She uses ibuprofen as needed for symptoms. She also rests and elevates when she has an increase in soreness.       PAST MEDICAL HISTORY:  Past Medical History:   Diagnosis Date    Allergic     Diabetes mellitus (HCC)     GERD (gastroesophageal reflux disease)     Hypertension  "    Sleep apnea     does not use cpap \"yet\"    Sleep apnea, obstructive     Varicella        PAST SURGICAL HISTORY:  Past Surgical History:   Procedure Laterality Date     SECTION      x2     VT OPEN TX DISTAL FIBULAR FRACTURE LAT MALLEOLUS Right 2022    Procedure: OPEN REDUCTION W/ INTERNAL FIXATION (ORIF) RIGHT ANKLE BIMALL EQUIV FRACTURE;  Surgeon: Jim Morse MD;  Location:  MAIN OR;  Service: Orthopedics       FAMILY HISTORY:  Family History   Problem Relation Age of Onset    Esophageal cancer Mother     Heart disease Father     No Known Problems Sister     No Known Problems Sister     No Known Problems Daughter     No Known Problems Maternal Grandmother     No Known Problems Maternal Grandfather     No Known Problems Paternal Grandmother     No Known Problems Paternal Grandfather     No Known Problems Brother     No Known Problems Brother     No Known Problems Son     No Known Problems Maternal Aunt     No Known Problems Paternal Aunt     No Known Problems Paternal Aunt     No Known Problems Paternal Aunt     No Known Problems Paternal Aunt     No Known Problems Paternal Aunt     Breast cancer Neg Hx        SOCIAL HISTORY:  Social History     Tobacco Use    Smoking status: Never    Smokeless tobacco: Never   Vaping Use    Vaping status: Never Used   Substance Use Topics    Alcohol use: Not Currently    Drug use: Never       MEDICATIONS:    Current Outpatient Medications:     LORazepam (Ativan) 0.5 mg tablet, Take 1 tablet (0.5 mg total) by mouth once as needed for anxiety for up to 3 doses, Disp: 3 tablet, Rfl: 0    methocarbamol (ROBAXIN) 500 mg tablet, Take 1 tablet (500 mg total) by mouth 2 (two) times a day as needed for muscle spasms, Disp: 90 tablet, Rfl: 0    metoclopramide (Reglan) 10 mg tablet, Take 1 tablet (10 mg total) by mouth every 6 (six) hours as needed (nausea, vomiting), Disp: 30 tablet, Rfl: 0    multivitamin (THERAGRAN) TABS, Take 1 tablet by mouth daily, Disp: , Rfl: " "    Ozempic, 2 MG/DOSE, 8 MG/3ML injection pen, inject 2 milligram subcutaneously weekly, Disp: , Rfl:     VITAMIN D PO, Take by mouth, Disp: , Rfl:     ALLERGIES:  No Known Allergies    LABS:  HgA1c: No results found for: \"HGBA1C\"  BMP:   Lab Results   Component Value Date    CALCIUM 8.8 07/12/2024    K 4.3 07/12/2024    CO2 28 07/12/2024     07/12/2024    BUN 8 07/12/2024    CREATININE 0.78 07/12/2024     CBC: No components found for: \"CBC\"    _____________________________________________________  PHYSICAL EXAMINATION:  Vital signs: Ht 5' 4\" (1.626 m)   Wt 94.2 kg (207 lb 9.6 oz)   BMI 35.63 kg/m²   General: No acute distress, awake and alert  Psychiatric: Mood and affect appear appropriate  HEENT: Trachea Midline, No torticollis, no apparent facial trauma  Cardiovascular: No audible murmurs; Extremities appear perfused  Pulmonary: No audible wheezing or stridor  Skin: No open lesions; see further details (if any) below    MUSCULOSKELETAL EXAMINATION:  Extremities:    Right Ankle   Anatomical deformity no   Skin is intact. Surgical incision is well healed.   There is no swelling present.   There is no ecchymosis present.   There is tenderness present over the peroneal insertion, achilles tendon.   Active range of motion: restricted in dorsiflexion and eversion   There is normal range of motion of toes in plantar flexion and dorsiflexion.     There is weakness with resisted plantarflexion, dorsiflexion, eversion.    Sensation is intact to light touch superficial peroneal, deep peroneal, tibial, saphenous, and sural nerve distributions.    2+ DP pulse present.      _____________________________________________________  STUDIES REVIEWED:  I personally reviewed the images obtained in office today and my independent interpretation is as follows:    X-rays of right ankle  taken 02/11/25 independently reviewed and demonstrate orthopedic hardware in distal fibula in stable alignment without evidence of loosening " or failure. Osteophyte formation at posterior calcaneous at achilles insertion.       PROCEDURES PERFORMED:  Procedures  None performed.        Scribe Attestation      I,:  Ngoc Quiroz am acting as a scribe while in the presence of the attending physician.:       I,:  Jim Morse MD personally performed the services described in this documentation    as scribed in my presence.:

## 2025-02-11 NOTE — ASSESSMENT & PLAN NOTE
Status post ORIF ankle, DOS 12/29/2022  Orders:    MRI ankle/heel right  wo contrast; Future     yes

## 2025-02-11 NOTE — ASSESSMENT & PLAN NOTE
Orders:    MRI ankle/heel right  wo contrast; Future    Ambulatory Referral to Podiatry; Future

## 2025-02-25 ENCOUNTER — HOSPITAL ENCOUNTER (OUTPATIENT)
Dept: ULTRASOUND IMAGING | Facility: CLINIC | Age: 50
Discharge: HOME | End: 2025-02-25
Payer: COMMERCIAL

## 2025-02-25 ENCOUNTER — HOSPITAL ENCOUNTER (OUTPATIENT)
Dept: MAMMOGRAPHY | Facility: CLINIC | Age: 50
Discharge: HOME | End: 2025-02-25
Payer: COMMERCIAL

## 2025-02-25 VITALS — HEIGHT: 64 IN | WEIGHT: 207 LBS | BODY MASS INDEX: 35.34 KG/M2

## 2025-02-25 DIAGNOSIS — R92.8 ABNORMAL MAMMOGRAM: ICD-10-CM

## 2025-02-25 DIAGNOSIS — Z12.31 ENCOUNTER FOR SCREENING MAMMOGRAM FOR BREAST CANCER: ICD-10-CM

## 2025-02-25 PROCEDURE — G0279 TOMOSYNTHESIS, MAMMO: HCPCS

## 2025-02-25 PROCEDURE — 76642 ULTRASOUND BREAST LIMITED: CPT

## 2025-02-25 PROCEDURE — 77065 DX MAMMO INCL CAD UNI: CPT

## 2025-03-04 ENCOUNTER — HOSPITAL ENCOUNTER (OUTPATIENT)
Dept: MRI IMAGING | Facility: HOSPITAL | Age: 50
Discharge: HOME | End: 2025-03-04
Attending: ORTHOPAEDIC SURGERY
Payer: OTHER MISCELLANEOUS

## 2025-03-04 DIAGNOSIS — M76.71 PERONEAL TENDONITIS, RIGHT: ICD-10-CM

## 2025-03-04 DIAGNOSIS — S82.841D CLOSED BIMALLEOLAR FRACTURE OF RIGHT ANKLE WITH ROUTINE HEALING, SUBSEQUENT ENCOUNTER: ICD-10-CM

## 2025-03-04 DIAGNOSIS — M76.61 ACHILLES TENDINITIS OF RIGHT LOWER EXTREMITY: ICD-10-CM

## 2025-03-04 PROCEDURE — 73721 MRI JNT OF LWR EXTRE W/O DYE: CPT

## 2025-06-18 PROBLEM — Z76.89 ENCOUNTER TO ESTABLISH CARE: Status: RESOLVED | Noted: 2020-12-03 | Resolved: 2025-06-18

## 2025-06-18 PROBLEM — M79.671 RIGHT FOOT PAIN: Status: RESOLVED | Noted: 2020-12-10 | Resolved: 2025-06-18

## 2025-06-18 PROBLEM — Z48.89 AFTERCARE FOLLOWING SURGERY: Status: RESOLVED | Noted: 2023-03-07 | Resolved: 2025-06-18

## 2025-06-18 PROBLEM — R09.81 SINUS CONGESTION: Status: RESOLVED | Noted: 2022-11-15 | Resolved: 2025-06-18

## 2025-06-18 PROBLEM — R11.0 NAUSEA: Status: RESOLVED | Noted: 2022-01-11 | Resolved: 2025-06-18

## 2025-06-18 PROBLEM — S82.891A CLOSED FRACTURE OF RIGHT ANKLE: Status: RESOLVED | Noted: 2022-12-27 | Resolved: 2025-06-18

## 2025-06-18 PROBLEM — U07.1 COVID-19: Status: RESOLVED | Noted: 2022-08-29 | Resolved: 2025-06-18

## 2025-06-18 PROBLEM — S82.841A CLOSED BIMALLEOLAR FRACTURE OF RIGHT ANKLE: Status: RESOLVED | Noted: 2023-01-10 | Resolved: 2025-06-18

## 2025-06-18 PROBLEM — R68.89 FLU-LIKE SYMPTOMS: Status: RESOLVED | Noted: 2022-08-25 | Resolved: 2025-06-18

## 2025-06-18 PROBLEM — J00 ACUTE NASOPHARYNGITIS: Status: RESOLVED | Noted: 2022-08-25 | Resolved: 2025-06-18

## 2025-06-18 PROBLEM — Z00.00 ANNUAL PHYSICAL EXAM: Status: RESOLVED | Noted: 2021-08-11 | Resolved: 2025-06-18

## 2025-06-18 PROBLEM — Z63.8 FAMILY CONFLICT: Status: RESOLVED | Noted: 2022-10-19 | Resolved: 2025-06-18

## 2025-06-18 NOTE — PROGRESS NOTES
Diagnoses and all orders for this visit:    Encounter for annual routine gynecological examination  -     Liquid-based pap, screening    Encounter for screening mammogram for malignant neoplasm of breast  -     Mammo screening bilateral w 3d and cad; Future      Perineal hygiene reviewed   Weight bearing exercises minium of 150 mins/weekly advised.   Kegel exercises recommended daily, see AVS for instructions and recommendations  SBE encouraged, ASCCP guidelines reviewed. Condoms encouraged with all sexual activity to prevent STI's.   Gardisil vaccines recommended up to age 45  Calcium/ Vit D dietary requirements discussed,   Advised to call with any issues,  all concerns & questions addressed.   See after visit summary for further information and recommendations to the above mentioned subjects which we may or may not have covered in detail during your visit   F/U Annually and PRN      Health Maintenance:    Last PAP: 2022 Neg HPV Neg   Next PAP Due: Collect today     Last Mammogram: 2024    Life time Monse Pastrana % 4.69, Density A almost entirely fatty, Bi-Rads 0, needed additional imaging for right focal asymmetry, diagnostic imaging completed 2024, diagnostic imaging completed 2025, repeat diagnostic imaging scheduled for 2025  Next Mammogram: order given for screening     Last Colonoscopy: 2022   repeat in 10 years       Subjective    CC: Yearly Exam      Jessy Johnson is a 49 y.o. female here for an annual exam.   GYN hx includes:    x2, menorrhagia   Family hx of:  No GYN cancers  Medically stable, reports no changes in medical Hx, follows with PMD    Patient's last menstrual period was 2025 (exact date).  Her menstrual cycles are spacing out, skipping months at a time . Flow is moderate, lasting 7 days when she does have a cycle , we discussed the definition of menopause being 1 full year with no menstrual cycle  Denies history of abnormal pap  "smear.  She denies breast concerns, abnormal vaginal discharge, vaginal itching, odor, irritation, bowel/bladder dysfunction, urinary symptoms, pelvic pain today.    Mentions occasional urinary leakage with cough, laugh, sneeze. Reviewed Kegel's, instructions provided in AVS, if not helpful after 3 months of regular Kegel's we can refer to PT, mentions leakage has imporved over the year with Kegel;s   She is not sexually active. She is  from her    Her current method of contraception includes  none. Denies any issues with her BCM.  She does not want STD testing today.  Safe in her home      ,   Children 23,21    Family History[1]    Vitals:    06/19/25 1421   BP: 126/80   BP Location: Left arm   Patient Position: Sitting   Cuff Size: Large   Weight: 96.2 kg (212 lb)   Height: 5' 4\" (1.626 m)     Body mass index is 36.39 kg/m².    Review of Systems     Constitutional: Negative for chills, fatigue, fever, headaches, visual disturbances, and unexpected weight change.   Respiratory: Negative for cough, & shortness of breath.  Cardiovascular: Negative for chest pain. .    Gastrointestinal: Negative for Abd pain, nausea & vomiting, constipation and diarrhea.   Genitourinary: Negative for difficulty urinating, dysuria, hematuria,  unusual vaginal bleeding or discharge  Skin: Negative skin changes    Physical Exam     Constitutional: Alert & Oriented x3, well-developed and well-nourished. No distress.   HENT: Atraumatic, Normocephalic, Conjunctivae clear  Neck: Normal range of motion.   Pulmonary: Effort normal.   Abdominal: Soft. No tenderness or masses  Musculoskeletal: Normal ROM  Skin: Warm & Dry  Psychological: Normal mood, thought content, behavior & judgement     Breasts:   Right: tissue soft without masses, tenderness, skin changes or nipple discharge. No areas of erythema or pain. No subclavicular, axillary, pectoral adenopathy  Left:  tissue soft without masses, tenderness, skin " changes or nipple discharge. No areas of erythema or pain. No subclavicular, axillary, pectoral adenopathy    Pelvic exam was performed with patient supine, lithotomy position.      Labia: Negative rash, tenderness, lesion or injury on the right labia.              Negative rash, tenderness, lesion or injury on the left labia.   Urethral meatus:  Negative for  tenderness, inflammation or discharge.   Uterus: not deviated, enlarged, fixed or tender.   Cervix: No CMT, no discharge or friability.   Right adnexa: no mass, no tenderness and no fullness.  Left adnexa: no mass, no tenderness and no fullness.   Vagina: No erythema, tenderness, masses, or foreign body in the vagina. No signs of injury around the vagina. No unusual vaginal discharge   Perineum without lesions, signs of injury, erythema or swelling.  Inguinal Canal:        Right: No inguinal adenopathy or hernia present.        Left: No inguinal adenopathy or hernia present.                  [1]   Family History  Problem Relation Name Age of Onset    Esophageal cancer Mother      Heart disease Father      No Known Problems Sister      No Known Problems Sister      No Known Problems Daughter      No Known Problems Maternal Grandmother      No Known Problems Maternal Grandfather      No Known Problems Paternal Grandmother      No Known Problems Paternal Grandfather      No Known Problems Brother      No Known Problems Brother      No Known Problems Son      No Known Problems Maternal Aunt      No Known Problems Paternal Aunt      No Known Problems Paternal Aunt      No Known Problems Paternal Aunt      No Known Problems Paternal Aunt      No Known Problems Paternal Aunt      Breast cancer Neg Hx

## 2025-06-18 NOTE — PATIENT INSTRUCTIONS
Patient Education     Lowering Your Risk of Breast Cancer   About this topic   Breast cancer is a serious illness. Breast cancer is when abnormal cells grow and divide more quickly in your breast. These cells form a growth or tumor. The abnormal cells may enter nearby tissue and spread to other parts of the body. It is the type of cancer most often seen in women. Men can have breast cancer, but it is a rare condition.  General   Some things in your life may increase your risk of breast cancer. You may not be able to change some of these. Others you can control.  You are more likely to get breast cancer if you:  Have a mother, sister, or daughter who has had breast cancer  Have used hormones for menopause for more than 5 years  Have had radiation therapy to the breast or chest in the past  Are overweight or do not exercise  Had your first menstrual period before you were 10 years old  Went through menopause after age 54  Have never been pregnant or had your first child after age 34  Have had breast cancer before  Drink alcohol in any form  Have dense breasts  Are older in age  There is no certain way to prevent breast cancer. There are things you can do to lower your chances of having breast cancer.  Keep a healthy weight. Lose weight if you are overweight. Being overweight raises your chances of having breast cancer.  Eat a healthy diet to maintain a healthy weight, such as more fruits, vegetables, and lean cuts of meat. Decrease the amount of saturated fat in your diet.  Exercise. Being active helps you keep a healthy weight.  Limit your alcohol intake or do not drink alcohol. The more alcohol you drink, the higher your risk.  Do not smoke cigarettes. Smoking can increase your risk of many types of cancer.  Breastfeed your baby. This may help protect you. The longer you breastfeed, the more protection you have.  Talk with your doctor about:  Limiting or stopping hormone therapy.  Taking certain drugs to prevent  breast cancer. For women at high risk of having breast cancer, there are a few drugs that may lower your risk.  Surgery to prevent you from having breast cancer if you are very high risk.  When do I need to call the doctor?   Changes in your breasts  A lump or area in your breast that feels different  Discharge from your nipple  Skin on your breast is dimpled or indented  You have questions or concerns about your breasts  Helpful tips   Talk to your doctor about the best kind of breast cancer screening for you.  If you want to do self breast exams, have your doctor show you the right way to do them.  Tell your doctor of any abnormal finding.  Last Reviewed Date   2021-10-04  Consumer Information Use and Disclaimer   This generalized information is a limited summary of diagnosis, treatment, and/or medication information. It is not meant to be comprehensive and should be used as a tool to help the user understand and/or assess potential diagnostic and treatment options. It does NOT include all information about conditions, treatments, medications, side effects, or risks that may apply to a specific patient. It is not intended to be medical advice or a substitute for the medical advice, diagnosis, or treatment of a health care provider based on the health care provider's examination and assessment of a patient’s specific and unique circumstances. Patients must speak with a health care provider for complete information about their health, medical questions, and treatment options, including any risks or benefits regarding use of medications. This information does not endorse any treatments or medications as safe, effective, or approved for treating a specific patient. Up[employer] Inc[employer] and its affiliates disclaim any warranty or liability relating to this information or the use thereof. The use of this information is governed by the Terms of Use, available at  https://www.woltersReelhouseuwer.com/en/know/clinical-effectiveness-terms   Copyright   Copyright © 2024 [employer] Inc. and its affiliates and/or licensors. All rights reserved.       Perineal Hygiene      Your vaginal naturally takes care of its self, it is a self washing system, the less you mess the healthier it will be     Please do not use any anti bacterial soaps,  liquid soaps, body wash or feminine wash to the vulva, these products can cause dermitis, bacterial infections and other vulvar problems.   Your partner should avoid the same products as well to genital area.  Use only water to cleanse vulva, if you feel you need soap you may use a small amount of  Dove White Bar or Dove White Sensitive Skin Bar soap ( no liquid soap) if necessary.    Avoid the use of washcloths, exfoliating ed's, netted scrubbers, or loofa's, use your hands only to cleanse the vulvar tissues    No scented lotions or products are advised in or near your vulva.    Use coconut oil in its solid form for moisture if needed.  No douching this may cause imbalance in your vaginal PH and further issues.    If you wear panty liners, you may apply a thin coating of Vaseline, A&D ointment or coconut oil to the vulvar tissues as a skin barrier     Wear Cotton underware, and loose fitting clothing  Use perfume-free, dye-free laundry detergent for under garments, use a second rinse cycle   Avoid fabric softeners/dryer sheets.         Over the counter probiotic to restore vaginal yosi may be helpful as well, take daily.       You may also look into Boric Acid vaginal suppositories to restore vaginal PH balance for up to 2 weeks as directed on the box. You may not use these if you are pregnant      For vaginal dryness:      You may use:     Coconut oil in solid form, not liquid (organic, pure, unscented) as needed for moisture or lubrication. ( Do not use if allergic)       Replens moisture restore external comfort gel daily ( use as directed on the  box)        Replens long lasting vaginal moisturizer  ( use as directed on the box)     May try NewLife vulvar moisturizer ( found on Amazon )    May try Revaree vaginal inserts        For Vaginal Lubrication:          You may use:     Coconut oil in solid form, not liquid (organic, pure, unscented) as a lubricant or another scent-free lubricant (Astroglide, Uberlube) if needed.  Do not use coconut oil or silicone if using a condom as this may break down the integrity of the condom and cause an unplanned pregnancy              Do not use coconut oil if allergic               Replens silky smooth lubricant, premium silicone based lubricant for intercourse. ( use as directed, a small amount will provide an enhanced natural feeling)     Any premium over the counter vaginal lubricant water or silicone based. Silicone based will have more staying power and friction relief         For Vulvar irritation/itching:        You may use Hydrocortisone 1 % over the counter to external vulvar tissues 2 x daily for 5-7 days to help with irriation and itch relief.  Patient Education     Pelvic Floor Exercises   About this topic   The pelvic floor consists of muscles and strong bands of tissues which support all of the organs in your pelvis. Some of these organs are the bladder and the small and large bowel as well as the womb and the prostate. If the muscles and tissues get weak, your organs may drop. This can lead to other problems. Your urine may leak when you laugh, sneeze, or cough. You may not be able to drain the bladder fully. You may have less problems if you do exercises to strengthen your pelvic floor and abdominal muscles.  Kegel exercises help make the muscles in the pelvic floor stronger. Anyone can do them. It is also important to make your abdominal muscles stronger. In order for these exercises to work, you must be consistent when doing them.  General   Before starting with a program, ask your doctor if you are  healthy enough to do these exercises. Your doctor may have you work with a  or physical therapist to make a safe exercise program to meet your needs.  Strengthening Exercises   Kegel exercises keep your pelvic muscles firm and strong. You can do these in many different positions. Start by lying down with your knees bent and feet on the bed. Squeeze the pelvic muscles as if you are trying to stop the flow of urine. Hold these muscles for a count of 3, and then slowly relax them for a count of 3. Try to work up to squeezing for a count of 10 and slowly relaxing for a count of 10. Increase the muscle squeeze until you get to 10. When relaxing, slowly relax to a count of 10.  Breathe out when you are squeezing and breathe in when you are relaxing. Your goal is to try to do 10 Kegels 3 or more times each day. Take time to rest between sets. Be sure to only contract your pelvic floor muscles, not your buttocks, thighs, or abdominal muscles.  Pelvic floor contractions ? There are a few ways to feel the pelvic muscles contract.  When you are passing urine, try suddenly stopping your flow of urine. Do not do this on a regular basis, but only to feel what the contraction feels like. Doing this while passing urine can lead to other problems.  Put a finger into the vagina or rectum. Contract the muscles around your finger as if you were trying to stop the flow of urine or stop the passing of gas.  Place two chairs without arms about 2 inches (5 cm) apart. Sit so you have one butt cheek on each chair. Now, try reinaldo your pelvic floor muscles. This will help you keep from using other muscles.  Pelvic tilts ? Lie on your back with your knees bent and feet flat on the floor. Tighten your stomach muscles and press your lower back down to the floor. Try doing Kegels with this exercise when your back is flattened. Hold 3 to 5 seconds. Relax.  Straight leg raises lying down ? Lie on your back with one leg straight. Bend  your other knee so the foot is flat on the bed. Keeping your leg straight, lift the leg up to the level of your other knee. Lower it back down. Repeat with the other leg.  Hip lifts ? Lie on your back with your knees bent and feet flat on the floor. Tighten your stomach muscles and lift your buttocks off the floor. Try doing Kegels when up in this position. Hold 3 to 5 seconds. Relax.  Abdominal bracing ? Do this exercise in different positions: Lying down, sitting, and standing. Tighten your stomach muscles. While keeping the stomach muscles tight, tighten your pelvic floor muscles. Now, forcefully laugh or cough to see if you were able to prevent urine from leaking.  Abdominal crunches ? Lie on your back with both knees bent. Keep your feet flat on the floor. Place your hands in one of these positions. Try starting with the first position since it is the easiest. As you get better, use the other positions to make it harder.  Crunches with arms at sides.  Crunches with arms across chest.  Crunches with arms behind head. Be careful not to interlock your fingers behind your neck or head while doing crunches. This may add tension to your neck and cause strain.  Look at the ceiling. Tighten your belly muscles and lift your shoulders and upper back off the floor. Breathe out while you are doing this. Lower your shoulders to the floor. Breathe in while you are doing this. Relax your belly muscles all the way before starting another crunch.             What will the results be?   Less leakage of urine when you cough, sneeze, laugh, or run  Fewer strong urges to pass urine  Fewer trips to the bathroom each day  Less risk of organs, such as the uterus or bladder, dropping into the vagina  Faster recovery after childbirth or prostate surgery  Stronger core muscles  Increased sensitivity during sex  Helpful tips   You can also try doing a different kind of Kegels. Do 5 quick, strong pelvic floor contractions. Sometimes, if  you have an urge to pass urine but are not near a bathroom, you can do this kind of Kegel to calm the urge.  Stay active and work out to keep your muscles strong and flexible.  Keep a healthy weight to avoid putting too much stress on your spine. Eat a healthy diet to keep your muscles healthy.  Be sure you do not hold your breath when exercising. This can raise your blood pressure. If you tend to hold your breath, try counting out loud when exercising. If any exercise bothers you, stop right away.  Try walking or cycling at an easy pace for a few minutes to warm up your muscles. Do this again after exercising.  Doing exercises before a meal may be a good way to get into a routine. A good time to do these exercises is each time you are stopped at a stop light while driving.  Exercise may be slightly uncomfortable, but you should not have sharp pains. If you do get sharp pains, stop what you are doing. If the sharp pains continue, call your doctor.  Last Reviewed Date   2021-03-31  Consumer Information Use and Disclaimer   This generalized information is a limited summary of diagnosis, treatment, and/or medication information. It is not meant to be comprehensive and should be used as a tool to help the user understand and/or assess potential diagnostic and treatment options. It does NOT include all information about conditions, treatments, medications, side effects, or risks that may apply to a specific patient. It is not intended to be medical advice or a substitute for the medical advice, diagnosis, or treatment of a health care provider based on the health care provider's examination and assessment of a patient’s specific and unique circumstances. Patients must speak with a health care provider for complete information about their health, medical questions, and treatment options, including any risks or benefits regarding use of medications. This information does not endorse any treatments or medications as safe,  effective, or approved for treating a specific patient. UpToDate, Inc. and its affiliates disclaim any warranty or liability relating to this information or the use thereof. The use of this information is governed by the Terms of Use, available at https://www.GlyS[employer].com/en/know/clinical-effectiveness-terms   Copyright   Copyright © 2024 UpToDate, Inc. and its affiliates and/or licensors. All rights reserved.  Patient Education     Perimenopause   About this topic   Perimenopause is the time which leads up to your last period. Perimenopause can take from 2 to 10 years before your periods fully stop. Some people start this stage around age 40. Others start this stage earlier or later.  What are the causes?   Changes in hormone levels cause perimenopause. Before this time, your hormone levels go up and down in an even pattern. As you get older, your hormone levels change. Your hormones do not follow an even pattern.  What can make this more likely to happen?   Perimenopause is a normal time in your life. You are more likely to have early perimenopause if you have been treated for cancer. If you have had a hysterectomy, you may be more at risk. If you are a smoker you may be at a higher risk. Some health treatments may also make early perimenopause more likely.  What are the main signs?   Periods that are not on a normal cycle  Hot flashes and night sweats  Mood changes  Vaginal dryness  Problems with sleep  Bladder problems  Changes in feelings about sex  Weaker bones  How does the doctor diagnose this health problem?   Your doctor will do an exam and take your history. The doctor will ask you about your periods. Your doctor may do a pelvic exam. The doctor may order lab tests.  How does the doctor treat this health problem?   The goal of care in perimenopause is to control the signs.  For very heavy bleeding during periods, the doctor may suggest endometrial ablation. The doctor uses laser, heat, or electrical  energy to remove the lining of the womb.  What lifestyle changes are needed?   Stay active and work out to keep your muscles strong and flexible, and to strengthen your bones.  Keep a healthy weight.  Avoid smoking.  Avoid beer, wine, and mixed drinks (alcohol). Avoid drinks with caffeine.  Learn how to manage stress. Learn ways to relax such as deep breathing and yoga.  What drugs may be needed?   The doctor may order drugs to:  Balance or replace hormones  Reduce hot flashes  Help with vaginal dryness  Ease heavy bleeding during periods  Strengthen the bones  Help with mood changes  Last Reviewed Date   2021-03-31  Consumer Information Use and Disclaimer   This generalized information is a limited summary of diagnosis, treatment, and/or medication information. It is not meant to be comprehensive and should be used as a tool to help the user understand and/or assess potential diagnostic and treatment options. It does NOT include all information about conditions, treatments, medications, side effects, or risks that may apply to a specific patient. It is not intended to be medical advice or a substitute for the medical advice, diagnosis, or treatment of a health care provider based on the health care provider's examination and assessment of a patient’s specific and unique circumstances. Patients must speak with a health care provider for complete information about their health, medical questions, and treatment options, including any risks or benefits regarding use of medications. This information does not endorse any treatments or medications as safe, effective, or approved for treating a specific patient. UpToDate, Inc. and its affiliates disclaim any warranty or liability relating to this information or the use thereof. The use of this information is governed by the Terms of Use, available at https://www.woltersUrbanFarmersuwer.com/en/know/clinical-effectiveness-terms   Copyright   Copyright © 2024 UpToDate, Inc. and its  "affiliates and/or licensors. All rights reserved.  Patient Education     Menopause   The Basics   Written by the doctors and editors at [facility]   What is menopause? -- Menopause is the time in life when monthly periods naturally stop. At this time, the ovaries stop releasing eggs and stop making the hormones estrogen and progesterone.  Menopause usually occurs between the ages of 45 and 55. The average age is 51.  Menopause does not happen all at once. It is a \"transition\" that takes years. It can cause symptoms that are difficult for a lot of people. But there are treatments that can help.  How do I know if I am going through menopause? -- You might wonder about menopause when your periods start to change. If you are going through menopause, you might:   Have periods more or less often than usual (for example, every 5 to 6 weeks instead of every 4)   Have shorter periods than before   Skip 1 or more periods   Have symptoms like hot flashes  If you have had a hysterectomy (surgery to remove your uterus), but you still have your ovaries, it might be tough to tell when you are going through menopause. Still, you can have menopause symptoms even if you no longer have a uterus.  If your ovaries were removed before the usual age of menopause, you had what doctors call \"surgical menopause.\" That just means that you went through it early, because your ovaries were removed.  What are the symptoms of menopause? -- Some people go through menopause without symptoms. But most have 1 or more of these symptoms:   Hot flashes - Hot flashes feel like a wave of heat that starts in your chest and face and then moves through your body. Hot flashes usually start happening before you stop having periods.   Night sweats - When hot flashes happen during sleep, they are called \"night sweats.\" They can make it hard to get a good night's sleep.   Sleep problems - During the transition to menopause, some people have trouble falling or " "staying asleep. This can happen even if night sweats are not a problem.   Vaginal dryness - Menopause can cause the vagina and tissues near the vagina to become dry and thin. This usually starts a few years after menopause. It can be uncomfortable or make sex painful.   Depression - During the transition to menopause, many people start having symptoms of depression or anxiety. This is more likely if you have had depression before. Depression symptoms include:   Sadness   Losing interest in doing things   Sleeping too much or too little   Trouble concentrating or remembering things - This might be caused by lack of sleep that often happens at menopause, or by the lack of estrogen. Some experts suspect that estrogen is important for good brain function.   Headaches - If you get migraine headaches related to your period, these might get worse during menopause.   Joint pain - Some people have joint aches or pains during and after menopause.  Many of these symptoms get better after menopause. But some people continue to have hot flashes, even for years afterwards.  Should I see a doctor or nurse? -- It depends. If your periods start changing and you are 45 or older, you do not need to see your doctor for this reason alone. But you should tell them if you have symptoms that bother you.  For example, see your doctor if you cannot sleep because of night sweats, if it is hard to work because of your hot flashes, or if you feel sad or down and don't seem to enjoy things anymore. If your regular doctor cannot recommend treatments that can help, you might consider looking for a doctor who is a specialist in menopause or women's health. They might have more information about ways to relieve symptoms.  You should also see a doctor or nurse if you:   Have your period more often than every 3 weeks   Have very heavy bleeding during your period   Have bleeding or \"spotting\" between periods   Have been through menopause (have gone 12 " months without a period) and start bleeding again, even if it's just a spot of blood  Is there a test for menopause? -- There is a test that can help tell if you are going through menopause. But doctors usually use this only in people who are too young to be in menopause or who have special circumstances.  Can I still get pregnant? -- As long as you are still having periods, even if they do not happen often, it is possible to get pregnant. If you have sex and do not want to get pregnant, use some form of birth control.  If you have not had a period for a full year, it is probably safe to say you have been through menopause and can no longer get pregnant.  How are the symptoms of menopause treated? -- There are treatments that can help relieve symptoms.  Treatments for hot flashes include:   Hormone therapy - The hormone estrogen is the most effective treatment for menopause symptoms. Most people need to take estrogen with another hormone, called progesterone. People who have had a hysterectomy (surgery to remove the uterus) can take estrogen by itself. Experts think that these hormones are effective and safe for most people.  If you want to take hormones, ask your doctor or nurse if it is an option for you. You should not take hormones if you have had breast cancer, a heart attack, a stroke, or a blood clot.   Antidepressants - Some types of antidepressants can ease hot flashes and depression. Even if you do not have depression, these medicines can still help with hot flashes. They are often used by people who have had breast cancer and cannot take estrogen.   Anti-seizure medicine - One of the medicines used to prevent seizures seems to help some people with hot flashes, even if they do not have seizures.  Treatments for vaginal dryness include:   Vaginal estrogen - Vaginal estrogen is any form of estrogen that goes directly into the vagina. It comes in creams, tablets, or a flexible ring. Vaginal estrogen comes in  "small doses that don't increase the levels of estrogen in other parts of the body very much.   Other medicines - In most cases, doctors recommend vaginal estrogen. That's because there is more evidence that it helps with vaginal dryness compared with other medicines. But if you do not want to use vaginal estrogen, your doctor can suggest other options. For example:   You might choose to use a vaginal moisturizer several times a week. Vaginal moisturizers (sample brand names: Replens, K-Y [id number]) do not contain hormones. They help keep the vagina moist all of the time. You can also add a lubricant (sample brand names: Astroglide, K-Y Jelly) when you have sex.   In some cases, doctors might suggest another medicine. For example, there is a tablet that you insert into the vagina once a day. There is also a pill that might help some people who cannot use vaginal products.  Some doctors are not used to prescribing hormone therapy for menopause. If you feel that you are not getting the help you need, you might choose to talk to a different doctor who is an expert in menopause treatment. You can ask your doctor or nurse to refer you to someone.  Can I do anything on my own to reduce the symptoms of menopause? -- Yes. There are some steps you can try (table 1). But ask your doctor before you take any \"natural remedies,\" especially if you have a history of breast cancer. Things like herbs and supplements are not proven to help, and in some cases, could harm you.  What can I do to protect my bones? -- You can:   Take calcium and vitamin D supplements.   Be active (physical activity helps keep bones strong).   Ask your doctor when you should start having bone density tests.  If needed, your doctor can prescribe medicines to help keep your bones strong.  All topics are updated as new evidence becomes available and our peer review process is complete.  This topic retrieved from [employer] on: Feb 26, 2024.  Topic 17646 Version " 11.0  Release: 32.2.4 - C32.56  © 2024 UpToDate, Inc. and/or its affiliates. All rights reserved.  table 1: Ways to cope with menopause symptoms  Symptom  What you can do    Hot flashes and night sweats Dress in layers so you can take off clothes if you get hot.    Keep your home at a comfortable temperature. Avoid hot drinks, such as coffee and tea.    Put a cold, wet washcloth against your neck during hot flashes.    Quit smoking, if you smoke. (Smoking makes hot flashes worse.) If you are having trouble quitting, your doctor or nurse can help.   Vaginal dryness Use a vaginal moisturizer a few times a week (sample brand names: Replens, K-Y SILK-E).    Use a lubricant before sex (sample brand names: Astroglide, K-Y Jelly).   Sleep problems Try to go to sleep and get up at the same time every day, even when you don't sleep well.    Avoid caffeine in the afternoon, and limit alcohol.   Depression Try to stay active. Exercise can help your mood.    Seek support from other people going through menopause.   Graphic 34411 Version 4.0  Consumer Information Use and Disclaimer   Disclaimer: This generalized information is a limited summary of diagnosis, treatment, and/or medication information. It is not meant to be comprehensive and should be used as a tool to help the user understand and/or assess potential diagnostic and treatment options. It does NOT include all information about conditions, treatments, medications, side effects, or risks that may apply to a specific patient. It is not intended to be medical advice or a substitute for the medical advice, diagnosis, or treatment of a health care provider based on the health care provider's examination and assessment of a patient's specific and unique circumstances. Patients must speak with a health care provider for complete information about their health, medical questions, and treatment options, including any risks or benefits regarding use of medications. This  information does not endorse any treatments or medications as safe, effective, or approved for treating a specific patient. UpToDate, Inc. and its affiliates disclaim any warranty or liability relating to this information or the use thereof.The use of this information is governed by the Terms of Use, available at https://www.[employer].com/en/know/clinical-effectiveness-terms. 2024© UpToDate, Inc. and its affiliates and/or licensors. All rights reserved.  Copyright   © 2024 UpToDate, Inc. and/or its affiliates. All rights reserved.

## 2025-06-19 ENCOUNTER — ANNUAL EXAM (OUTPATIENT)
Dept: OBGYN CLINIC | Facility: CLINIC | Age: 50
End: 2025-06-19
Payer: COMMERCIAL

## 2025-06-19 ENCOUNTER — APPOINTMENT (OUTPATIENT)
Dept: LAB | Facility: CLINIC | Age: 50
End: 2025-06-19
Payer: COMMERCIAL

## 2025-06-19 ENCOUNTER — RESULTS FOLLOW-UP (OUTPATIENT)
Dept: FAMILY MEDICINE CLINIC | Facility: CLINIC | Age: 50
End: 2025-06-19

## 2025-06-19 VITALS
BODY MASS INDEX: 36.19 KG/M2 | HEIGHT: 64 IN | DIASTOLIC BLOOD PRESSURE: 80 MMHG | WEIGHT: 212 LBS | SYSTOLIC BLOOD PRESSURE: 126 MMHG

## 2025-06-19 DIAGNOSIS — E55.9 VITAMIN D DEFICIENCY: Primary | ICD-10-CM

## 2025-06-19 DIAGNOSIS — E03.9 HYPOTHYROIDISM, UNSPECIFIED TYPE: ICD-10-CM

## 2025-06-19 DIAGNOSIS — E55.9 HYPOVITAMINOSIS D: Primary | ICD-10-CM

## 2025-06-19 DIAGNOSIS — E55.9 HYPOVITAMINOSIS D: ICD-10-CM

## 2025-06-19 DIAGNOSIS — D50.9 IRON DEFICIENCY ANEMIA, UNSPECIFIED IRON DEFICIENCY ANEMIA TYPE: ICD-10-CM

## 2025-06-19 DIAGNOSIS — Z12.31 ENCOUNTER FOR SCREENING MAMMOGRAM FOR MALIGNANT NEOPLASM OF BREAST: ICD-10-CM

## 2025-06-19 DIAGNOSIS — Z00.00 HEALTH CARE MAINTENANCE: ICD-10-CM

## 2025-06-19 DIAGNOSIS — R53.83 OTHER FATIGUE: ICD-10-CM

## 2025-06-19 DIAGNOSIS — Z01.419 ENCOUNTER FOR ANNUAL ROUTINE GYNECOLOGICAL EXAMINATION: Primary | ICD-10-CM

## 2025-06-19 LAB
25(OH)D3 SERPL-MCNC: 17.2 NG/ML (ref 30–100)
ALBUMIN SERPL BCG-MCNC: 4 G/DL (ref 3.5–5)
ALP SERPL-CCNC: 107 U/L (ref 34–104)
ALT SERPL W P-5'-P-CCNC: 17 U/L (ref 7–52)
ANION GAP SERPL CALCULATED.3IONS-SCNC: 7 MMOL/L (ref 4–13)
AST SERPL W P-5'-P-CCNC: 23 U/L (ref 13–39)
BASOPHILS # BLD AUTO: 0.07 THOUSANDS/ÂΜL (ref 0–0.1)
BASOPHILS NFR BLD AUTO: 1 % (ref 0–1)
BILIRUB SERPL-MCNC: 0.48 MG/DL (ref 0.2–1)
BUN SERPL-MCNC: 11 MG/DL (ref 5–25)
CALCIUM SERPL-MCNC: 8.8 MG/DL (ref 8.4–10.2)
CHLORIDE SERPL-SCNC: 106 MMOL/L (ref 96–108)
CHOLEST SERPL-MCNC: 145 MG/DL (ref ?–200)
CO2 SERPL-SCNC: 27 MMOL/L (ref 21–32)
CREAT SERPL-MCNC: 0.8 MG/DL (ref 0.6–1.3)
EOSINOPHIL # BLD AUTO: 0.28 THOUSAND/ÂΜL (ref 0–0.61)
EOSINOPHIL NFR BLD AUTO: 3 % (ref 0–6)
ERYTHROCYTE [DISTWIDTH] IN BLOOD BY AUTOMATED COUNT: 17.9 % (ref 11.6–15.1)
FERRITIN SERPL-MCNC: 6 NG/ML (ref 30–307)
GFR SERPL CREATININE-BSD FRML MDRD: 86 ML/MIN/1.73SQ M
GLUCOSE P FAST SERPL-MCNC: 84 MG/DL (ref 65–99)
HCT VFR BLD AUTO: 37.9 % (ref 34.8–46.1)
HDLC SERPL-MCNC: 46 MG/DL
HGB BLD-MCNC: 11.2 G/DL (ref 11.5–15.4)
IMM GRANULOCYTES # BLD AUTO: 0.03 THOUSAND/UL (ref 0–0.2)
IMM GRANULOCYTES NFR BLD AUTO: 0 % (ref 0–2)
IRON SATN MFR SERPL: 8 % (ref 15–50)
IRON SERPL-MCNC: 35 UG/DL (ref 50–212)
LDLC SERPL CALC-MCNC: 83 MG/DL (ref 0–100)
LYMPHOCYTES # BLD AUTO: 3.3 THOUSANDS/ÂΜL (ref 0.6–4.47)
LYMPHOCYTES NFR BLD AUTO: 39 % (ref 14–44)
MCH RBC QN AUTO: 23.9 PG (ref 26.8–34.3)
MCHC RBC AUTO-ENTMCNC: 29.6 G/DL (ref 31.4–37.4)
MCV RBC AUTO: 81 FL (ref 82–98)
MONOCYTES # BLD AUTO: 0.87 THOUSAND/ÂΜL (ref 0.17–1.22)
MONOCYTES NFR BLD AUTO: 10 % (ref 4–12)
NEUTROPHILS # BLD AUTO: 3.94 THOUSANDS/ÂΜL (ref 1.85–7.62)
NEUTS SEG NFR BLD AUTO: 47 % (ref 43–75)
NONHDLC SERPL-MCNC: 99 MG/DL
NRBC BLD AUTO-RTO: 0 /100 WBCS
PLATELET # BLD AUTO: 380 THOUSANDS/UL (ref 149–390)
PMV BLD AUTO: 11.7 FL (ref 8.9–12.7)
POTASSIUM SERPL-SCNC: 4 MMOL/L (ref 3.5–5.3)
PROT SERPL-MCNC: 7.6 G/DL (ref 6.4–8.4)
RBC # BLD AUTO: 4.69 MILLION/UL (ref 3.81–5.12)
SODIUM SERPL-SCNC: 140 MMOL/L (ref 135–147)
T4 FREE SERPL-MCNC: 0.67 NG/DL (ref 0.61–1.12)
TIBC SERPL-MCNC: 413 UG/DL (ref 250–450)
TRANSFERRIN SERPL-MCNC: 295 MG/DL (ref 203–362)
TRIGL SERPL-MCNC: 79 MG/DL (ref ?–150)
TSH SERPL DL<=0.05 MIU/L-ACNC: 9.13 UIU/ML (ref 0.45–4.5)
UIBC SERPL-MCNC: 378 UG/DL (ref 155–355)
WBC # BLD AUTO: 8.49 THOUSAND/UL (ref 4.31–10.16)

## 2025-06-19 PROCEDURE — 83550 IRON BINDING TEST: CPT

## 2025-06-19 PROCEDURE — 84439 ASSAY OF FREE THYROXINE: CPT

## 2025-06-19 PROCEDURE — 80061 LIPID PANEL: CPT

## 2025-06-19 PROCEDURE — 83540 ASSAY OF IRON: CPT

## 2025-06-19 PROCEDURE — 82306 VITAMIN D 25 HYDROXY: CPT

## 2025-06-19 PROCEDURE — 80053 COMPREHEN METABOLIC PANEL: CPT

## 2025-06-19 PROCEDURE — 85025 COMPLETE CBC W/AUTO DIFF WBC: CPT

## 2025-06-19 PROCEDURE — G0476 HPV COMBO ASSAY CA SCREEN: HCPCS | Performed by: OBSTETRICS & GYNECOLOGY

## 2025-06-19 PROCEDURE — 99396 PREV VISIT EST AGE 40-64: CPT | Performed by: OBSTETRICS & GYNECOLOGY

## 2025-06-19 PROCEDURE — 82728 ASSAY OF FERRITIN: CPT

## 2025-06-19 PROCEDURE — G0145 SCR C/V CYTO,THINLAYER,RESCR: HCPCS | Performed by: OBSTETRICS & GYNECOLOGY

## 2025-06-19 PROCEDURE — 84443 ASSAY THYROID STIM HORMONE: CPT

## 2025-06-19 PROCEDURE — 36415 COLL VENOUS BLD VENIPUNCTURE: CPT

## 2025-06-19 RX ORDER — ERGOCALCIFEROL 1.25 MG/1
50000 CAPSULE, LIQUID FILLED ORAL 2 TIMES WEEKLY
Qty: 16 CAPSULE | Refills: 0 | Status: SHIPPED | OUTPATIENT
Start: 2025-06-19 | End: 2025-08-12

## 2025-06-19 RX ORDER — LEVOTHYROXINE SODIUM 50 UG/1
50 TABLET ORAL
Qty: 100 TABLET | Refills: 3 | Status: SHIPPED | OUTPATIENT
Start: 2025-06-19 | End: 2025-07-03 | Stop reason: SDUPTHER

## 2025-06-25 LAB
LAB AP GYN PRIMARY INTERPRETATION: NORMAL
Lab: NORMAL

## 2025-07-03 ENCOUNTER — OFFICE VISIT (OUTPATIENT)
Dept: FAMILY MEDICINE CLINIC | Facility: CLINIC | Age: 50
End: 2025-07-03
Payer: COMMERCIAL

## 2025-07-03 VITALS
DIASTOLIC BLOOD PRESSURE: 96 MMHG | BODY MASS INDEX: 36.37 KG/M2 | HEIGHT: 64 IN | TEMPERATURE: 97.7 F | OXYGEN SATURATION: 98 % | SYSTOLIC BLOOD PRESSURE: 150 MMHG | RESPIRATION RATE: 16 BRPM | WEIGHT: 213 LBS | HEART RATE: 84 BPM

## 2025-07-03 DIAGNOSIS — Z00.00 ANNUAL PHYSICAL EXAM: Primary | ICD-10-CM

## 2025-07-03 DIAGNOSIS — R03.0 ELEVATED BLOOD PRESSURE READING: ICD-10-CM

## 2025-07-03 DIAGNOSIS — E03.9 HYPOTHYROIDISM, UNSPECIFIED TYPE: ICD-10-CM

## 2025-07-03 PROCEDURE — 99396 PREV VISIT EST AGE 40-64: CPT | Performed by: NURSE PRACTITIONER

## 2025-07-03 RX ORDER — LEVOTHYROXINE SODIUM 50 UG/1
50 TABLET ORAL
Qty: 100 TABLET | Refills: 3 | Status: SHIPPED | OUTPATIENT
Start: 2025-07-03

## 2025-07-03 NOTE — ASSESSMENT & PLAN NOTE
Blood pressure is elevated in office.  Patient is advised to check blood pressure at home call if it continues to be elevated greater than 140/90 may need to start medication.  Discussed lifestyle changes heart healthy diet increase exercise activity

## 2025-07-03 NOTE — PATIENT INSTRUCTIONS
"Patient Education     Routine physical for adults   The Basics   Written by the doctors and editors at [facility]   What is a physical? -- A physical is a routine visit, or \"check-up,\" with your doctor. You might also hear it called a \"wellness visit\" or \"preventive visit.\"  During each visit, the doctor will:   Ask about your physical and mental health   Ask about your habits, behaviors, and lifestyle   Do an exam   Give you vaccines if needed   Talk to you about any medicines you take   Give advice about your health   Answer your questions  Getting regular check-ups is an important part of taking care of your health. It can help your doctor find and treat any problems you have. But it's also important for preventing health problems.  A routine physical is different from a \"sick visit.\" A sick visit is when you see a doctor because of a health concern or problem. Since physicals are scheduled ahead of time, you can think about what you want to ask the doctor.  How often should I get a physical? -- It depends on your age and health. In general, for people age 21 years and older:   If you are younger than 50 years, you might be able to get a physical every 3 years.   If you are 50 years or older, your doctor might recommend a physical every year.  If you have an ongoing health condition, like diabetes or high blood pressure, your doctor will probably want to see you more often.  What happens during a physical? -- In general, each visit will include:   Physical exam - The doctor or nurse will check your height, weight, heart rate, and blood pressure. They will also look at your eyes and ears. They will ask about how you are feeling and whether you have any symptoms that bother you.   Medicines - It's a good idea to bring a list of all the medicines you take to each doctor visit. Your doctor will talk to you about your medicines and answer any questions. Tell them if you are having any side effects that bother you. You " "should also tell them if you are having trouble paying for any of your medicines.   Habits and behaviors - This includes:   Your diet   Your exercise habits   Whether you smoke, drink alcohol, or use drugs   Whether you are sexually active   Whether you feel safe at home  Your doctor will talk to you about things you can do to improve your health and lower your risk of health problems. They will also offer help and support. For example, if you want to quit smoking, they can give you advice and might prescribe medicines. If you want to improve your diet or get more physical activity, they can help you with this, too.   Lab tests, if needed - The tests you get will depend on your age and situation. For example, your doctor might want to check your:   Cholesterol   Blood sugar   Iron level   Vaccines - The recommended vaccines will depend on your age, health, and what vaccines you already had. Vaccines are very important because they can prevent certain serious or deadly infections.   Discussion of screening - \"Screening\" means checking for diseases or other health problems before they cause symptoms. Your doctor can recommend screening based on your age, risk, and preferences. This might include tests to check for:   Cancer, such as breast, prostate, cervical, ovarian, colorectal, prostate, lung, or skin cancer   Sexually transmitted infections, such as chlamydia and gonorrhea   Mental health conditions like depression and anxiety  Your doctor will talk to you about the different types of screening tests. They can help you decide which screenings to have. They can also explain what the results might mean.   Answering questions - The physical is a good time to ask the doctor or nurse questions about your health. If needed, they can refer you to other doctors or specialists, too.  Adults older than 65 years often need other care, too. As you get older, your doctor will talk to you about:   How to prevent falling at " home   Hearing or vision tests   Memory testing   How to take your medicines safely   Making sure that you have the help and support you need at home  All topics are updated as new evidence becomes available and our peer review process is complete.  This topic retrieved from [employer] on: May 02, 2024.  Topic 280579 Version 1.0  Release: 32.4.3 - C32.122  © 2024 UpToDate, Inc. and/or its affiliates. All rights reserved.  Consumer Information Use and Disclaimer   Disclaimer: This generalized information is a limited summary of diagnosis, treatment, and/or medication information. It is not meant to be comprehensive and should be used as a tool to help the user understand and/or assess potential diagnostic and treatment options. It does NOT include all information about conditions, treatments, medications, side effects, or risks that may apply to a specific patient. It is not intended to be medical advice or a substitute for the medical advice, diagnosis, or treatment of a health care provider based on the health care provider's examination and assessment of a patient's specific and unique circumstances. Patients must speak with a health care provider for complete information about their health, medical questions, and treatment options, including any risks or benefits regarding use of medications. This information does not endorse any treatments or medications as safe, effective, or approved for treating a specific patient. UpToDate, Inc. and its affiliates disclaim any warranty or liability relating to this information or the use thereof.The use of this information is governed by the Terms of Use, available at https://www.[employer]tersPower[employer]resuwer.com/en/know/clinical-effectiveness-terms. 2024© UpToDate, Inc. and its affiliates and/or licensors. All rights reserved.  Copyright   © 2024 UpToDate, Inc. and/or its affiliates. All rights reserved.

## 2025-07-03 NOTE — PROGRESS NOTES
Adult Annual Physical  Name: Jessy Johnson      : 1975      MRN: [id number]  Encounter Provider: SKYE Root  Encounter Date: 7/3/2025   Encounter department: [facility]    :  Assessment & Plan  Annual physical exam  Annual physical completed.  Reviewed blood work.  Vitamin D replacement sent in.  Continue heart healthy diet.  Patient is up-to-date with Pap smear, mammogram, colon cancer screening.       Hypothyroidism, unspecified type  Abnormal thyroid level.  Patient reports symptoms of palpitations insomnia.  Levothyroxine ordered.  Will recheck in 6 weeks.  Orders:    TSH, 3rd generation with Free T4 reflex; Future    levothyroxine 50 mcg tablet; Take 1 tablet (50 mcg total) by mouth daily in the early morning    Elevated blood pressure reading  Blood pressure is elevated in office.  Patient is advised to check blood pressure at home call if it continues to be elevated greater than 140/90 may need to start medication.  Discussed lifestyle changes heart healthy diet increase exercise activity             Preventive Screenings:  - Diabetes Screening: screening up-to-date  - Cervical cancer screening: screening up-to-date   - Breast cancer screening: screening up-to-date   - Colon cancer screening: screening up-to-date   - Lung cancer screening: screening not indicated     Immunizations:  - Immunizations due: Prevnar 20, Tdap and Zoster (Shingrix)    Counseling/Anticipatory Guidance:  - Alcohol: discussed moderation in alcohol intake and recommendations for healthy alcohol use.   - Drug use: discussed harms of illicit drug use and how it can negatively impact mental/physical health.   - Tobacco use: discussed harms of tobacco use and management options for quitting.   - Dental health: discussed importance of regular tooth brushing, flossing, and dental visits.   - Sexual health: discussed sexually transmitted diseases, partner selection, use of condoms, avoidance of  "unintended pregnancy, and contraceptive alternatives.   - Diet: discussed recommendations for a healthy/well-balanced diet.   - Exercise: the importance of regular exercise/physical activity was discussed. Recommend exercise 3-5 times per week for at least 30 minutes.   - Injury prevention: discussed safety/seat belts, safety helmets, smoke detectors, carbon monoxide detectors, and smoking near bedding or upholstery.       Depression Screening and Follow-up Plan: Patient was screened for depression during today's encounter. They screened negative with a PHQ-2 score of 0.          History of Present Illness     Adult Annual Physical:  Patient presents for annual physical.     Diet and Physical Activity:  - Diet/Nutrition: well balanced diet.  - Exercise: walking.    Depression Screening:  - PHQ-2 Score: 0    General Health:  - Sleep: sleeps well.  - Hearing: normal hearing bilateral ears.  - Vision: wears glasses and most recent eye exam < 1 year ago.  - Dental: brushes teeth twice daily.    /GYN Health:    - Menopause: postmenopausal.   - History of STDs: no  - Contraception: menopause.      Advanced Care Planning:  - Has an advanced directive?: no    - Has a durable medical POA?: no    - ACP document given to patient?: no      Review of Systems      Objective   /96 (BP Location: Left arm, Patient Position: Sitting, Cuff Size: Extra-Large)   Pulse 84   Temp 97.7 °F (36.5 °C) (Temporal)   Resp 16   Ht 5' 4\" (1.626 m)   Wt 96.6 kg (213 lb)   LMP 04/12/2025 (Exact Date)   SpO2 98%   BMI 36.56 kg/m²     Physical Exam  Constitutional:       General: She is not in acute distress.     Appearance: Normal appearance. She is obese. She is not ill-appearing.   HENT:      Head: Normocephalic and atraumatic.      Nose: Nose normal.      Mouth/Throat:      Mouth: Mucous membranes are moist.     Eyes:      Pupils: Pupils are equal, round, and reactive to light.       Cardiovascular:      Rate and Rhythm: Normal " rate and regular rhythm.      Pulses: Normal pulses.      Heart sounds: Normal heart sounds.   Pulmonary:      Effort: Pulmonary effort is normal. No respiratory distress.      Breath sounds: Normal breath sounds.   Chest:      Chest wall: No tenderness.   Abdominal:      General: Abdomen is flat. Bowel sounds are normal. There is no distension.      Palpations: There is no mass.      Tenderness: There is no abdominal tenderness.     Musculoskeletal:         General: Normal range of motion.      Cervical back: Normal range of motion and neck supple.     Skin:     General: Skin is warm and dry.     Neurological:      General: No focal deficit present.      Mental Status: She is alert and oriented to person, place, and time.     Psychiatric:         Mood and Affect: Mood normal.         Behavior: Behavior normal.         Thought Content: Thought content normal.         Judgment: Judgment normal.

## 2025-07-03 NOTE — ASSESSMENT & PLAN NOTE
Abnormal thyroid level.  Patient reports symptoms of palpitations insomnia.  Levothyroxine ordered.  Will recheck in 6 weeks.  Orders:    TSH, 3rd generation with Free T4 reflex; Future    levothyroxine 50 mcg tablet; Take 1 tablet (50 mcg total) by mouth daily in the early morning

## (undated) DEVICE — NEEDLE 25G X 1 1/2

## (undated) DEVICE — GAUZE SPONGES,16 PLY: Brand: CURITY

## (undated) DEVICE — CHLORAPREP HI-LITE 26ML ORANGE

## (undated) DEVICE — PENCIL ELECTROSURG E-Z CLEAN -0035H

## (undated) DEVICE — PAD GROUNDING ADULT

## (undated) DEVICE — PADDING CAST 4 IN  COTTON STRL

## (undated) DEVICE — SPONGE SCRUB 4 PCT CHLORHEXIDINE

## (undated) DEVICE — 2.5MM DRILL BIT/QC/GOLD/110MM

## (undated) DEVICE — GLOVE INDICATOR PI UNDERGLOVE SZ 8 BLUE

## (undated) DEVICE — ELECTRODE BLADE MOD E-Z CLEAN 2.5IN 6.4CM -0012M

## (undated) DEVICE — DRAPE C-ARM X-RAY

## (undated) DEVICE — BULB SYRINGE,IRRIGATION WITH PROTECTIVE CAP: Brand: DOVER

## (undated) DEVICE — DRILL BIT 2.0MM

## (undated) DEVICE — CURITY NON-ADHERENT STRIPS: Brand: CURITY

## (undated) DEVICE — SUT VICRYL PLUS 2-0 CTB-1 27 IN VCPB259H

## (undated) DEVICE — GLOVE SRG BIOGEL ORTHOPEDIC 8

## (undated) DEVICE — INTENDED FOR TISSUE SEPARATION, AND OTHER PROCEDURES THAT REQUIRE A SHARP SURGICAL BLADE TO PUNCTURE OR CUT.: Brand: BARD-PARKER SAFETY BLADES SIZE 15, STERILE

## (undated) DEVICE — 3M™ STERI-STRIP™ REINFORCED ADHESIVE SKIN CLOSURES, R1546, 1/4 IN X 4 IN (6 MM X 100 MM), 10 STRIPS/ENVELOPE: Brand: 3M™ STERI-STRIP™

## (undated) DEVICE — SUT ETHILON 3-0 FS-1 18 IN 663G

## (undated) DEVICE — UNIVERSAL MAJOR EXTREMITY,KIT: Brand: CARDINAL HEALTH

## (undated) DEVICE — SILVER-COATED ANTIMICROBIAL BARRIER DRESSING: Brand: ACTICOAT   4" X 8"

## (undated) DEVICE — 4.0MM CANCELLOUS BONE SCREW FULLY THREADED/12MM
Type: IMPLANTABLE DEVICE | Site: ANKLE | Status: NON-FUNCTIONAL
Removed: 2022-12-29

## (undated) DEVICE — DRAPE SHEET THREE QUARTER

## (undated) DEVICE — 2.7MM THREE-FLUTED DRILL BIT QC/125MM

## (undated) DEVICE — 2.8MM DRILL BIT/QC/165MM

## (undated) DEVICE — ACE WRAP 6 IN UNSTERILE

## (undated) DEVICE — DRAPE EQUIPMENT RF WAND

## (undated) DEVICE — ABDOMINAL PAD: Brand: DERMACEA